# Patient Record
Sex: MALE | Race: WHITE | NOT HISPANIC OR LATINO
[De-identification: names, ages, dates, MRNs, and addresses within clinical notes are randomized per-mention and may not be internally consistent; named-entity substitution may affect disease eponyms.]

---

## 2017-01-12 PROBLEM — Z00.00 ENCOUNTER FOR PREVENTIVE HEALTH EXAMINATION: Status: ACTIVE | Noted: 2017-01-12

## 2018-05-07 ENCOUNTER — APPOINTMENT (OUTPATIENT)
Dept: CARDIOLOGY | Facility: CLINIC | Age: 73
End: 2018-05-07
Payer: MEDICARE

## 2018-05-07 VITALS
HEIGHT: 73 IN | WEIGHT: 206 LBS | BODY MASS INDEX: 27.3 KG/M2 | SYSTOLIC BLOOD PRESSURE: 118 MMHG | RESPIRATION RATE: 15 BRPM | DIASTOLIC BLOOD PRESSURE: 78 MMHG | HEART RATE: 92 BPM

## 2018-05-07 DIAGNOSIS — F17.200 NICOTINE DEPENDENCE, UNSPECIFIED, UNCOMPLICATED: ICD-10-CM

## 2018-05-07 DIAGNOSIS — Z82.49 FAMILY HISTORY OF ISCHEMIC HEART DISEASE AND OTHER DISEASES OF THE CIRCULATORY SYSTEM: ICD-10-CM

## 2018-05-07 PROCEDURE — 93000 ELECTROCARDIOGRAM COMPLETE: CPT | Mod: 59

## 2018-05-07 PROCEDURE — 93224 XTRNL ECG REC UP TO 48 HRS: CPT

## 2018-05-07 PROCEDURE — 93306 TTE W/DOPPLER COMPLETE: CPT

## 2018-05-07 PROCEDURE — 99204 OFFICE O/P NEW MOD 45 MIN: CPT

## 2018-05-21 ENCOUNTER — APPOINTMENT (OUTPATIENT)
Dept: CARDIOLOGY | Facility: CLINIC | Age: 73
End: 2018-05-21
Payer: MEDICARE

## 2018-05-21 PROCEDURE — 93880 EXTRACRANIAL BILAT STUDY: CPT

## 2018-07-30 ENCOUNTER — APPOINTMENT (OUTPATIENT)
Dept: CARDIOLOGY | Facility: CLINIC | Age: 73
End: 2018-07-30
Payer: MEDICARE

## 2018-07-30 PROCEDURE — 78452 HT MUSCLE IMAGE SPECT MULT: CPT

## 2018-07-30 PROCEDURE — A9500: CPT

## 2018-07-30 PROCEDURE — 93015 CV STRESS TEST SUPVJ I&R: CPT

## 2018-07-30 RX ORDER — REGADENOSON 0.08 MG/ML
0.4 INJECTION, SOLUTION INTRAVENOUS
Qty: 1 | Refills: 0 | Status: COMPLETED | OUTPATIENT
Start: 2018-07-30

## 2018-07-30 RX ORDER — AMINOPHYLLINE 25 MG/ML
25 INJECTION, SOLUTION INTRAVENOUS
Qty: 0 | Refills: 0 | Status: COMPLETED | OUTPATIENT
Start: 2018-07-30

## 2018-07-30 RX ADMIN — AMINOPHYLLINE 0 MG/ML: 25 INJECTION, SOLUTION INTRAVENOUS at 00:00

## 2018-07-30 RX ADMIN — REGADENOSON 0 MG/5ML: 0.08 INJECTION, SOLUTION INTRAVENOUS at 00:00

## 2018-08-03 RX ORDER — KIT FOR THE PREPARATION OF TECHNETIUM TC99M SESTAMIBI 1 MG/5ML
INJECTION, POWDER, LYOPHILIZED, FOR SOLUTION PARENTERAL
Refills: 0 | Status: COMPLETED | OUTPATIENT
Start: 2018-08-03

## 2018-08-03 RX ADMIN — KIT FOR THE PREPARATION OF TECHNETIUM TC99M SESTAMIBI 0: 1 INJECTION, POWDER, LYOPHILIZED, FOR SOLUTION PARENTERAL at 00:00

## 2018-11-19 ENCOUNTER — APPOINTMENT (OUTPATIENT)
Dept: CARDIOLOGY | Facility: CLINIC | Age: 73
End: 2018-11-19
Payer: MEDICARE

## 2018-11-19 VITALS
DIASTOLIC BLOOD PRESSURE: 86 MMHG | RESPIRATION RATE: 16 BRPM | BODY MASS INDEX: 27.98 KG/M2 | HEIGHT: 73 IN | SYSTOLIC BLOOD PRESSURE: 120 MMHG | WEIGHT: 211.13 LBS | HEART RATE: 83 BPM

## 2018-11-19 PROCEDURE — 99214 OFFICE O/P EST MOD 30 MIN: CPT

## 2018-11-19 PROCEDURE — 93000 ELECTROCARDIOGRAM COMPLETE: CPT

## 2018-11-20 NOTE — PHYSICAL EXAM
[General Appearance - Well Developed] : well developed [General Appearance - Well Nourished] : well nourished [General Appearance - In No Acute Distress] : no acute distress [Normal Conjunctiva] : the conjunctiva exhibited no abnormalities [Normal Oral Mucosa] : normal oral mucosa [Auscultation Breath Sounds / Voice Sounds] : lungs were clear to auscultation bilaterally [Veins - Varicosity Changes] : no varicosital changes were noted in the lower extremities [Irregularly Irregular] : the rhythm was irregularly irregular [Systolic grade ___/6] : A grade [unfilled]/6 systolic murmur was heard. [Bowel Sounds] : normal bowel sounds [Abnormal Walk] : normal gait [Skin Color & Pigmentation] : normal skin color and pigmentation [Skin Turgor] : normal skin turgor [Oriented To Time, Place, And Person] : oriented to person, place, and time [Affect] : the affect was normal [Mood] : the mood was normal [FreeTextEntry1] : No edema

## 2018-11-20 NOTE — HISTORY OF PRESENT ILLNESS
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  He states that his rheumatoid arthritis has flared up and he is scheduled to see his rheumatologist in the next few weeks.

## 2018-11-20 NOTE — ASSESSMENT
[FreeTextEntry1] : 1.  EKG today reveals atrial fibrillation with a controlled ventricular response.  One wide complex noted consistent with aberrancy.  No ischemic changes. \par 2.  Hypertension:  Blood pressure well controlled at this time on current medications.  A low-salt diet as well as weight loss is advised.  \par 3.  Hyperlipidemia:  Patient advised to follow a low-fat / low-cholesterol diet.  Fasting lipids to be performed prior to his next office visit. \par 4.  Chronic atrial fibrillation:  Patient remains with good rate control.  Recent Holter monitor revealed atrial fibrillation with a range between 56 and 155 bpm.  The average heart rate was 90 bpm.  Occasional multifocal PVCs averaging 28 per hour were noted.  No other significant findings. \par 5.  Recent non-invasive cardiac evaluation included echocardiography which revealed mild dilatation of the left ventricle with grossly normal global wall motion.  Left ventricular ejection fraction 55-60%.  There was enlargement of the left atrium as well as normal right-sided chamber dimensions.  The aortic root is dilated and measures approximately 4.8 cm.  The ascending aorta 4.0 cm.  There is moderate aortic valve insufficiency in association with the dilatation of the aortic root.  Patient to maintain normal blood pressure and to avoid unnecessary heavy lifting.  Repeat echocardiography in the next 6 months. \par 6.  Recent nuclear stress test revealed mild ischemia in the left ventricular apex.  Patient remains completely asymptomatic at this time.  Will observe at this point.  If symptoms develop, cardiac catheterization will be considered.  \par 7.  Carotid artery disease:  Patient with noted mild bilateral plaquing.  Advised to continue current Crestor therapy.  Patient should also add low-dose aspirin 81 mg daily.  If stable from a cardiac standpoint, office visit 6 months.  \par 
improved
improved
alert

## 2018-11-20 NOTE — REASON FOR VISIT
[FreeTextEntry1] : Patient is a 73-year-old white male with a past medical history significant for rheumatoid arthritis as well as hypercholesterolemia and atrial fibrillation who presents for evaluation.

## 2019-02-25 ENCOUNTER — APPOINTMENT (OUTPATIENT)
Dept: CARDIOLOGY | Facility: CLINIC | Age: 74
End: 2019-02-25
Payer: MEDICARE

## 2019-02-25 ENCOUNTER — NON-APPOINTMENT (OUTPATIENT)
Age: 74
End: 2019-02-25

## 2019-02-25 VITALS
HEART RATE: 90 BPM | SYSTOLIC BLOOD PRESSURE: 129 MMHG | WEIGHT: 214 LBS | BODY MASS INDEX: 28.36 KG/M2 | RESPIRATION RATE: 16 BRPM | HEIGHT: 73 IN | DIASTOLIC BLOOD PRESSURE: 90 MMHG

## 2019-02-25 DIAGNOSIS — R07.9 CHEST PAIN, UNSPECIFIED: ICD-10-CM

## 2019-02-25 PROCEDURE — 93000 ELECTROCARDIOGRAM COMPLETE: CPT

## 2019-02-25 PROCEDURE — 99214 OFFICE O/P EST MOD 30 MIN: CPT

## 2019-02-26 NOTE — ASSESSMENT
[FreeTextEntry1] : 1.  EKG today reveals atrial fibrillation with a controlled ventricular response.  No acute ischemic changes. \par 2.  Chest pain:  Patient with new onset chest pain which does not appear to be exertional.  Have advised on the addition of Metoprolol Succinate 25 mg daily as well as continuation of aspirin therapy.  Patient will undergo repeat nuclear stress test in the near future to reassess.  If symptoms worsen in any way, patient to contact me regarding alternative therapy. \par 3.  Hyperlipidemia:  Patient tolerating Crestor therapy well.  Has not undergone fasting bloodwork, but will do so in the near future. \par 4.  Aortic dilatation.  Patient will undergo follow up echocardiography within the next three months.   \par

## 2019-02-26 NOTE — REASON FOR VISIT
[FreeTextEntry1] : Patient presents today for the evaluation and management of hyperlipidemia and atrial fibrillation.  \par   \par

## 2019-02-26 NOTE — HISTORY OF PRESENT ILLNESS
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  Pain is non-radiating and does not appear to be associated with exertion.  Patient denies diaphoresis, shortness of breath, or other symptoms.  Known to have apical reversibility on prior nuclear stress test and has been completely asymptomatic from a chest pain point of view up until now.

## 2019-03-12 ENCOUNTER — MED ADMIN CHARGE (OUTPATIENT)
Age: 74
End: 2019-03-12

## 2019-03-12 ENCOUNTER — APPOINTMENT (OUTPATIENT)
Dept: CARDIOLOGY | Facility: CLINIC | Age: 74
End: 2019-03-12
Payer: MEDICARE

## 2019-03-12 PROCEDURE — A9500: CPT

## 2019-03-12 PROCEDURE — 78452 HT MUSCLE IMAGE SPECT MULT: CPT

## 2019-03-12 PROCEDURE — 93015 CV STRESS TEST SUPVJ I&R: CPT

## 2019-03-12 RX ADMIN — REGADENOSON 5 MG/5ML: 0.08 INJECTION, SOLUTION INTRAVENOUS at 00:00

## 2019-03-15 RX ORDER — KIT FOR THE PREPARATION OF TECHNETIUM TC99M SESTAMIBI 1 MG/5ML
INJECTION, POWDER, LYOPHILIZED, FOR SOLUTION PARENTERAL
Refills: 0 | Status: COMPLETED | OUTPATIENT
Start: 2019-03-15

## 2019-03-15 RX ADMIN — KIT FOR THE PREPARATION OF TECHNETIUM TC99M SESTAMIBI 0: 1 INJECTION, POWDER, LYOPHILIZED, FOR SOLUTION PARENTERAL at 00:00

## 2019-03-25 RX ORDER — REGADENOSON 0.08 MG/ML
0.4 INJECTION, SOLUTION INTRAVENOUS
Qty: 1 | Refills: 0 | Status: COMPLETED | OUTPATIENT
Start: 2019-03-12

## 2019-05-06 ENCOUNTER — APPOINTMENT (OUTPATIENT)
Dept: CARDIOLOGY | Facility: CLINIC | Age: 74
End: 2019-05-06
Payer: MEDICARE

## 2019-05-06 ENCOUNTER — NON-APPOINTMENT (OUTPATIENT)
Age: 74
End: 2019-05-06

## 2019-05-06 VITALS
HEIGHT: 73 IN | DIASTOLIC BLOOD PRESSURE: 78 MMHG | WEIGHT: 212 LBS | SYSTOLIC BLOOD PRESSURE: 124 MMHG | BODY MASS INDEX: 28.1 KG/M2 | RESPIRATION RATE: 16 BRPM | HEART RATE: 92 BPM

## 2019-05-06 PROCEDURE — 93000 ELECTROCARDIOGRAM COMPLETE: CPT

## 2019-05-06 PROCEDURE — 99214 OFFICE O/P EST MOD 30 MIN: CPT

## 2019-05-10 NOTE — HISTORY OF PRESENT ILLNESS
[FreeTextEntry1] : Patient presents today without specific cardiac-related complaints.  He states he had a 1-2 minute of chest discomfort a couple of days ago which occurred at rest.  He attributes it to GI issues.  There was no specific diaphoresis, shortness of breath, or other symptoms.  He recently underwent nuclear stress testing which failed to reveal evidence of ischemia.

## 2019-05-10 NOTE — ASSESSMENT
[FreeTextEntry1] : 1.  EKG today reveals atrial fibrillation with a controlled ventricular response.  Normal intervals.  No evidence of ischemia. \par 2.  Hyperlipidemia:  Patient has not undergone fasting bloodwork in recent months.  Will do so prior to his next office visit. \par 3.  Chronic atrial fibrillation:  Patient’s atrial fibrillation under control at this time.  \par 4.  Chest pain:  Recent nuclear stress test failed to reveal evidence of pharmacologically induced reversible ischemia.  Patient reassured and advised to continue a low-fat / low-cholesterol diet as well as an exercise program. \par 5.  If clinically stable and with no further atypical chest pain, patient will return in three months after undergoing a follow up echocardiogram.  \par

## 2019-05-10 NOTE — PHYSICAL EXAM
[General Appearance - Well Developed] : well developed [General Appearance - In No Acute Distress] : no acute distress [General Appearance - Well Nourished] : well nourished [Normal Conjunctiva] : the conjunctiva exhibited no abnormalities [Normal Oral Mucosa] : normal oral mucosa [Auscultation Breath Sounds / Voice Sounds] : lungs were clear to auscultation bilaterally [Veins - Varicosity Changes] : no varicosital changes were noted in the lower extremities [Irregularly Irregular] : the rhythm was irregularly irregular [Systolic grade ___/6] : A grade [unfilled]/6 systolic murmur was heard. [Bowel Sounds] : normal bowel sounds [Abnormal Walk] : normal gait [Skin Color & Pigmentation] : normal skin color and pigmentation [Skin Turgor] : normal skin turgor [Oriented To Time, Place, And Person] : oriented to person, place, and time [Affect] : the affect was normal [Mood] : the mood was normal [FreeTextEntry1] : No edema

## 2019-07-01 ENCOUNTER — APPOINTMENT (OUTPATIENT)
Dept: CARDIOLOGY | Facility: CLINIC | Age: 74
End: 2019-07-01
Payer: MEDICARE

## 2019-07-01 PROCEDURE — 93306 TTE W/DOPPLER COMPLETE: CPT

## 2019-09-04 ENCOUNTER — NON-APPOINTMENT (OUTPATIENT)
Age: 74
End: 2019-09-04

## 2019-09-04 ENCOUNTER — APPOINTMENT (OUTPATIENT)
Dept: CARDIOLOGY | Facility: CLINIC | Age: 74
End: 2019-09-04
Payer: MEDICARE

## 2019-09-04 VITALS
DIASTOLIC BLOOD PRESSURE: 70 MMHG | BODY MASS INDEX: 27.43 KG/M2 | WEIGHT: 207 LBS | HEART RATE: 83 BPM | HEIGHT: 73 IN | SYSTOLIC BLOOD PRESSURE: 124 MMHG

## 2019-09-04 PROCEDURE — 99215 OFFICE O/P EST HI 40 MIN: CPT

## 2019-09-04 PROCEDURE — 93000 ELECTROCARDIOGRAM COMPLETE: CPT

## 2019-09-05 NOTE — PHYSICAL EXAM
[General Appearance - Well Developed] : well developed [General Appearance - Well Nourished] : well nourished [General Appearance - In No Acute Distress] : no acute distress [Normal Oral Mucosa] : normal oral mucosa [Normal Conjunctiva] : the conjunctiva exhibited no abnormalities [Auscultation Breath Sounds / Voice Sounds] : lungs were clear to auscultation bilaterally [Veins - Varicosity Changes] : no varicosital changes were noted in the lower extremities [Irregularly Irregular] : the rhythm was irregularly irregular [Systolic grade ___/6] : A grade [unfilled]/6 systolic murmur was heard. [Bowel Sounds] : normal bowel sounds [Abnormal Walk] : normal gait [Skin Color & Pigmentation] : normal skin color and pigmentation [Skin Turgor] : normal skin turgor [Oriented To Time, Place, And Person] : oriented to person, place, and time [Affect] : the affect was normal [Mood] : the mood was normal [FreeTextEntry1] : No edema

## 2019-09-05 NOTE — REASON FOR VISIT
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

## 2019-09-05 NOTE — ASSESSMENT
[FreeTextEntry1] : 1.  EKG today reveals atrial fibrillation with a controlled ventricular response.   Non-specific ST-T changes.  \par 2.  Hyperlipidemia:  Review of recent lipid profile performed through his PCP’s office reveals a total cholesterol of 176, HDL 75, TC/HDL ratio 2.3, LDL 84, triglycerides 81.  Patient advised to continue with current medications and follow a low-fat / low-cholesterol diet.  \par 3.  Chronic atrial fibrillation:  Patient remains clinically stable denying chest pain, shortness of breath, lightheadedness, or syncope.  His atrial fibrillation appears to be well controlled at this time.  Recent echocardiography revealed mild left ventricular enlargement with normal global systolic function.  Ejection fraction estimated at 55-60%.  Severe dilatation of the left atrial cavity is noted.  Patient also with mild dilatation of the right-sided chambers.  The aortic root demonstrates a diameter of approximately 4.7 cm while the ascending aorta measures 4.3 cm.  Mild to moderate aortic valve insufficiency is noted as well.  No other significant findings.  Patient advised to maintain blood pressure at current levels and to avoid unnecessary lifting.       \par 4.  Recent CBC demonstrates an H and H of 17.3 and 52.9.  This is noted in the presence of a BUN of 34 and a creatinine of 1.03.  Patient may be somewhat dehydrated and be demonstrating relative polycythemia.  However, I have advised him to seek the attention of a hematologist for further evaluation.  It is possible that the patient has developed chronic pulmonary disease secondary to tobacco use or possibly underlying sleep apnea. \par 5.  Given the above, patient to continue all current medications and return to see me within three months for reevaluation.   \par

## 2019-09-05 NOTE — HISTORY OF PRESENT ILLNESS
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

## 2019-10-30 ENCOUNTER — RX RENEWAL (OUTPATIENT)
Age: 74
End: 2019-10-30

## 2019-10-31 ENCOUNTER — MEDICATION RENEWAL (OUTPATIENT)
Age: 74
End: 2019-10-31

## 2019-12-02 ENCOUNTER — APPOINTMENT (OUTPATIENT)
Dept: CARDIOLOGY | Facility: CLINIC | Age: 74
End: 2019-12-02
Payer: MEDICARE

## 2019-12-02 VITALS
DIASTOLIC BLOOD PRESSURE: 78 MMHG | WEIGHT: 207 LBS | HEIGHT: 73 IN | RESPIRATION RATE: 16 BRPM | HEART RATE: 81 BPM | SYSTOLIC BLOOD PRESSURE: 130 MMHG | BODY MASS INDEX: 27.43 KG/M2

## 2019-12-02 DIAGNOSIS — R94.31 ABNORMAL ELECTROCARDIOGRAM [ECG] [EKG]: ICD-10-CM

## 2019-12-02 PROCEDURE — 99214 OFFICE O/P EST MOD 30 MIN: CPT

## 2019-12-02 PROCEDURE — 93000 ELECTROCARDIOGRAM COMPLETE: CPT

## 2019-12-04 NOTE — ASSESSMENT
[FreeTextEntry1] :  1.  EKG today reveals atrial fibrillation with a controlled ventricular response.  Left anterior hemiblock with left axis deviation.  Non-specific ST-T changes.  2.  Chronic atrial fibrillation:  Patient remains in a-fib with a controlled ventricular response.  Tolerating Eliquis therapy well.  3.  Hyperlipidemia:  Patient advised to follow a low-fat / low-cholesterol diet.   4.  Recently elevated H and H.  Patient to see hematologist in the next two weeks.  5.  Dilatation of the aortic root and ascending aorta as well as multichamber enlargement on echocardiography.  Will obtain cardiac CTA in the near future to further assess overall picture.   6.  Tobacco use:  Patient still smoking up to 10 cigarettes per day.  May have developed COPD.  Will also need an assessment for sleep apnea.  He will monitor this at home initially and report back to me in three months.

## 2019-12-04 NOTE — HISTORY OF PRESENT ILLNESS
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  He does complain of chronic post-nasal drip.  No other significant symptoms.

## 2019-12-04 NOTE — REASON FOR VISIT
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 \par

## 2020-04-06 ENCOUNTER — APPOINTMENT (OUTPATIENT)
Dept: CARDIOLOGY | Facility: CLINIC | Age: 75
End: 2020-04-06

## 2020-06-01 ENCOUNTER — APPOINTMENT (OUTPATIENT)
Dept: CARDIOLOGY | Facility: CLINIC | Age: 75
End: 2020-06-01

## 2020-07-07 ENCOUNTER — APPOINTMENT (OUTPATIENT)
Dept: CARDIOLOGY | Facility: CLINIC | Age: 75
End: 2020-07-07
Payer: MEDICARE

## 2020-07-07 VITALS
HEIGHT: 73 IN | DIASTOLIC BLOOD PRESSURE: 74 MMHG | HEART RATE: 81 BPM | TEMPERATURE: 96.9 F | RESPIRATION RATE: 16 BRPM | WEIGHT: 204 LBS | BODY MASS INDEX: 27.04 KG/M2 | SYSTOLIC BLOOD PRESSURE: 122 MMHG

## 2020-07-07 PROCEDURE — 99214 OFFICE O/P EST MOD 30 MIN: CPT

## 2020-07-07 PROCEDURE — 93000 ELECTROCARDIOGRAM COMPLETE: CPT

## 2020-07-07 RX ORDER — FOLIC ACID/MULTIVIT,IRON,MINER 0.4MG-18MG
TABLET ORAL
Refills: 0 | Status: DISCONTINUED | COMMUNITY
End: 2020-07-07

## 2020-07-07 NOTE — REASON FOR VISIT
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 \par \par \par

## 2020-07-07 NOTE — ASSESSMENT
[FreeTextEntry1] : \par 1.  EKG today reveals atrial fibrillation with a controlled ventricular response.  Small Qs in leads III and aVF.  Non-specific ST-T changes.  \par \par Chronic atrial fibrillation:  Patient remains in a-fib with a controlled ventricular response.  Tolerating Eliquis therapy well. \par \par 2.  Hyperlipidemia:  Recent lipid profile performed through his PCP’s office demonstrates a total cholesterol of 170, HDL 64, TC/HDL ratio 2.7, LDL 84, triglycerides 123.  Patient advised to continue current statin therapy as well as follow a strict low-fat / low-cholesterol diet.  \par \par 3.  Dilatation of the aortic root and ascending aorta:  Patient remains clinically stable but has not undergone follow up echocardiography in some time.  Will schedule an echo prior to his next office visit.  It is also likely that he will undergo a chest CT scan for further evaluation as well. \par \par 4.  Polycythemia:  Patient with periodic phlebotomies through his hematologist.  Continues to smoke 5-6 cigarettes per day.  I have advised him to quit smoking.    \par

## 2020-07-07 NOTE — PHYSICAL EXAM
[General Appearance - Well Nourished] : well nourished [General Appearance - Well Developed] : well developed [General Appearance - In No Acute Distress] : no acute distress [Normal Conjunctiva] : the conjunctiva exhibited no abnormalities [Normal Oral Mucosa] : normal oral mucosa [Auscultation Breath Sounds / Voice Sounds] : lungs were clear to auscultation bilaterally [Veins - Varicosity Changes] : no varicosital changes were noted in the lower extremities [Irregularly Irregular] : the rhythm was irregularly irregular [Systolic grade ___/6] : A grade [unfilled]/6 systolic murmur was heard. [Bowel Sounds] : normal bowel sounds [Abnormal Walk] : normal gait [Skin Color & Pigmentation] : normal skin color and pigmentation [Skin Turgor] : normal skin turgor [Affect] : the affect was normal [Oriented To Time, Place, And Person] : oriented to person, place, and time [Mood] : the mood was normal [FreeTextEntry1] : No edema

## 2020-10-20 ENCOUNTER — RX RENEWAL (OUTPATIENT)
Age: 75
End: 2020-10-20

## 2021-01-14 ENCOUNTER — APPOINTMENT (OUTPATIENT)
Dept: CARDIOLOGY | Facility: CLINIC | Age: 76
End: 2021-01-14
Payer: MEDICARE

## 2021-01-14 PROCEDURE — 93306 TTE W/DOPPLER COMPLETE: CPT

## 2021-03-30 ENCOUNTER — APPOINTMENT (OUTPATIENT)
Dept: CARDIOLOGY | Facility: CLINIC | Age: 76
End: 2021-03-30
Payer: MEDICARE

## 2021-03-30 VITALS
RESPIRATION RATE: 16 BRPM | SYSTOLIC BLOOD PRESSURE: 138 MMHG | BODY MASS INDEX: 27.83 KG/M2 | TEMPERATURE: 98 F | DIASTOLIC BLOOD PRESSURE: 80 MMHG | HEART RATE: 93 BPM | HEIGHT: 73 IN | WEIGHT: 210 LBS

## 2021-03-30 VITALS — DIASTOLIC BLOOD PRESSURE: 84 MMHG | SYSTOLIC BLOOD PRESSURE: 124 MMHG

## 2021-03-30 PROCEDURE — 99214 OFFICE O/P EST MOD 30 MIN: CPT

## 2021-03-30 PROCEDURE — 93000 ELECTROCARDIOGRAM COMPLETE: CPT

## 2021-04-01 NOTE — ASSESSMENT
[FreeTextEntry1] : 1.  EKG today reveals atrial fibrillation with a controlled ventricular response.  Qs in leads III and aVF.  No acute ischemic changes.  \par \par 2.  Dilatation of the aortic root and ascending aorta:  Echocardiography performed this January revealed normal left ventricular chamber dimensions and wall motion with preserved ejection fraction estimated between 55 and 60%.  Moderate dilatation of the left atrium is noted.  The right-sided chambers have normal dimensions and function.  There is moderate dilatation of the aortic root and ascending aorta.  Diameters of 4.7 and 4.4 cm respectively are noted.  These are essentially unchanged when compared to his prior echo performed in 2019.\par \par 3.  Patient is again advised on good blood pressure control and avoiding lifting objects heavier than 40 lbs.  \par \par 4.  Chronic atrial fibrillation:  Patient remains in a-fib with a controlled ventricular response.  Tolerating beta blocker therapy and Eliquis well.  \par \par 5.  Hyperlipidemia:  Recent bloodwork revealed a total cholesterol of 159, HDL 66, TC/HDL ratio 2.4, LDL 77, triglycerides 82.  Patient advised to continue with current statin therapy as well as a low-fat / low-cholesterol diet.  If clinically stable, follow up office visit three months.   \par

## 2021-04-01 NOTE — REASON FOR VISIT
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   \par  none

## 2021-04-01 NOTE — HISTORY OF PRESENT ILLNESS
[FreeTextEntry1] :  From a cardiac standpoint, Dr. Juarez remains clinically stable denying exertional chest pain, shortness of breath, palpitations, lightheadedness, or syncope.  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

## 2021-11-10 ENCOUNTER — RX RENEWAL (OUTPATIENT)
Age: 76
End: 2021-11-10

## 2022-02-09 ENCOUNTER — RX RENEWAL (OUTPATIENT)
Age: 77
End: 2022-02-09

## 2022-03-28 ENCOUNTER — RESULT CHARGE (OUTPATIENT)
Age: 77
End: 2022-03-28

## 2022-03-29 ENCOUNTER — APPOINTMENT (OUTPATIENT)
Dept: CARDIOLOGY | Facility: CLINIC | Age: 77
End: 2022-03-29
Payer: MEDICARE

## 2022-03-29 VITALS
SYSTOLIC BLOOD PRESSURE: 160 MMHG | BODY MASS INDEX: 27.83 KG/M2 | WEIGHT: 210 LBS | RESPIRATION RATE: 16 BRPM | HEIGHT: 73 IN | HEART RATE: 85 BPM | DIASTOLIC BLOOD PRESSURE: 82 MMHG

## 2022-03-29 VITALS — DIASTOLIC BLOOD PRESSURE: 80 MMHG | SYSTOLIC BLOOD PRESSURE: 140 MMHG

## 2022-03-29 PROCEDURE — 99214 OFFICE O/P EST MOD 30 MIN: CPT

## 2022-03-29 PROCEDURE — 93000 ELECTROCARDIOGRAM COMPLETE: CPT

## 2022-03-30 NOTE — REASON FOR VISIT
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   \par

## 2022-03-30 NOTE — HISTORY OF PRESENT ILLNESS
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  He does present with fatigue which is nonspecific.  \par

## 2022-03-30 NOTE — ASSESSMENT
[FreeTextEntry1] : 1.  EKG today reveals atrial fibrillation with a controlled ventricular response.  One wide complex noted suggesting possible PVC versus aberrantly conducted beat.  No acute ischemic changes. \par \par 2.  Chronic atrial fibrillation:  Patient remains in a-fib with a controlled ventricular response.  Has no complaints at this time.  Tolerating Eliquis therapy well. \par \par 3.  Hypertension:  Blood pressure borderline controlled at this time.  Patient admits to eating fair amount salty products.  Additionally has had intermittent mild lower extremity edema.  Currently on Furosemide 20.  Have advised him to augment Furosemide to a 20/40 schedule whenever edema presents.  Will also provide patient with potassium 10 mEq daily given daily cramping.  Patient will undergo bloodwork in 3-4 weeks to assess renal function as well as cholesterol.  \par \par 4.  Hyperlipidemia:  No recent bloodwork available.  Patient advised to continue with Crestor. \par \par 5.  Polycythemia:  Under the care of hematology and undergoing periodic phlebotomies.  \par \par 6.  Patient will undergo echocardiography to assess the dilatation of his aorta prior to his next office visit in six weeks.  \par

## 2022-04-19 ENCOUNTER — APPOINTMENT (OUTPATIENT)
Dept: CARDIOLOGY | Facility: CLINIC | Age: 77
End: 2022-04-19
Payer: MEDICARE

## 2022-04-19 PROCEDURE — 93306 TTE W/DOPPLER COMPLETE: CPT

## 2022-05-10 ENCOUNTER — NON-APPOINTMENT (OUTPATIENT)
Age: 77
End: 2022-05-10

## 2022-05-10 ENCOUNTER — APPOINTMENT (OUTPATIENT)
Dept: CARDIOLOGY | Facility: CLINIC | Age: 77
End: 2022-05-10
Payer: MEDICARE

## 2022-05-10 VITALS
HEIGHT: 73 IN | SYSTOLIC BLOOD PRESSURE: 142 MMHG | HEART RATE: 94 BPM | BODY MASS INDEX: 26.51 KG/M2 | RESPIRATION RATE: 16 BRPM | WEIGHT: 200 LBS | DIASTOLIC BLOOD PRESSURE: 80 MMHG

## 2022-05-10 PROCEDURE — 93000 ELECTROCARDIOGRAM COMPLETE: CPT

## 2022-05-10 PROCEDURE — 99214 OFFICE O/P EST MOD 30 MIN: CPT

## 2022-05-10 NOTE — DISCUSSION/SUMMARY
[FreeTextEntry1] : Dr. Judd in to speak with the patient.\par \par 1 - Hypertension:  blood pressure borderline controlled on current medications and HR in the 90.  Will increase metoprolol succinate to 50mg daily.  Advised to follow strict low sodium diet.\par \par 2 - Dilatation of the ascending aorta:  recent echocardiogram (4/19/2022):  EF 50-55%.  Moderate asymmetric left ventricular hypertrophy.  Severely dilated left atrium.  MOderately dilated right atrium.  Mildly enlarged right ventricle.  There is mild to moderate dilatation of the aortic root (4.51 cm) and ascending aorta (4.2 cm).  Mild aortic valve sclerosis without stenosis.  Mild to moderate aortic regurgitation.   Mild mitral valve regurgitation.  Trace pulmonic valve regurgitation.  Mildly elevated pulmonary artery systolic pressures.  Prior measurements of aortic root (1/2021, 4.7cm - 7/2019, 4.7cm - 5/2018, 4.8cm) and ascending aorta (1/2021, 4.4cm - 7/2019, 4.3cm - 5/2018, 4cm).  Will have Dr. Juarez undergo CT of the chest to better assess progression of ascending aorta and aortic root.\par \par 3 - Chronic atrial fibrillation:  today's EKG reveals atrial fibrillation with a controlled ventricular response (94 bpm).  Increased metoprolol succinate to 50mg daily.  Tolerating Eliquis 5mg BID well.\par \par 4 - Polycythemia:  H/H 17.1/52.5, RBC 5.84.  Patient is followed by hematology, Dr. Krzysztof Schaeffer.  Labs will be forwarded to his office.\par \par 5 - Hyperlipidemia:  cholesterol 175, HDL 72, triglycerides 83, LDL 86, TC/HDL 2.4.  Continue Crestor 20mg daily.  Follow low fat, low cholesterol diet.\par \par 6 - Recent labs (4/19/2022):  glucose 99, HgA1c 5.3, potassium 4.8, BUN 31, creatinine 1.07, TSH 2.9m, Quantiferon TB Gold was negative.\par \par 7 - Follow up with Dr. Judd in 3 months.

## 2022-05-10 NOTE — CARDIOLOGY SUMMARY
[de-identified] : Atrial fibrillation with a controlled ventricular response.  No acute ischemic changes.

## 2022-05-10 NOTE — PHYSICAL EXAM
[Well Developed] : well developed [Well Nourished] : well nourished [No Acute Distress] : no acute distress [Normal Conjunctiva] : normal conjunctiva [Normal Venous Pressure] : normal venous pressure [No Carotid Bruit] : no carotid bruit [Normal S1, S2] : normal S1, S2 [No Rub] : no rub [No Gallop] : no gallop [Clear Lung Fields] : clear lung fields [No Respiratory Distress] : no respiratory distress  [Soft] : abdomen soft [Normal Bowel Sounds] : normal bowel sounds [Normal Gait] : normal gait [No Rash] : no rash [No Skin Lesions] : no skin lesions [Moves all extremities] : moves all extremities [No Focal Deficits] : no focal deficits [Normal Speech] : normal speech [Alert and Oriented] : alert and oriented [Normal memory] : normal memory [de-identified] : Irregularly irregular rhythm, I-II/VI systolic murmur [de-identified] : Trace to 1+ bilateral LE edema L>R

## 2022-05-10 NOTE — HISTORY OF PRESENT ILLNESS
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

## 2022-08-02 ENCOUNTER — APPOINTMENT (OUTPATIENT)
Dept: CARDIOLOGY | Facility: CLINIC | Age: 77
End: 2022-08-02

## 2022-08-30 ENCOUNTER — APPOINTMENT (OUTPATIENT)
Dept: CARDIOLOGY | Facility: CLINIC | Age: 77
End: 2022-08-30

## 2022-08-30 ENCOUNTER — NON-APPOINTMENT (OUTPATIENT)
Age: 77
End: 2022-08-30

## 2022-08-30 VITALS — SYSTOLIC BLOOD PRESSURE: 132 MMHG | DIASTOLIC BLOOD PRESSURE: 80 MMHG

## 2022-08-30 VITALS
HEIGHT: 73 IN | BODY MASS INDEX: 27.96 KG/M2 | HEART RATE: 87 BPM | DIASTOLIC BLOOD PRESSURE: 84 MMHG | WEIGHT: 211 LBS | RESPIRATION RATE: 16 BRPM | SYSTOLIC BLOOD PRESSURE: 142 MMHG

## 2022-08-30 PROCEDURE — 99214 OFFICE O/P EST MOD 30 MIN: CPT

## 2022-08-30 PROCEDURE — 93000 ELECTROCARDIOGRAM COMPLETE: CPT

## 2022-08-30 NOTE — PHYSICAL EXAM
[Well Developed] : well developed [Well Nourished] : well nourished [No Acute Distress] : no acute distress [Normal Conjunctiva] : normal conjunctiva [Normal Venous Pressure] : normal venous pressure [No Carotid Bruit] : no carotid bruit [Normal S1, S2] : normal S1, S2 [No Rub] : no rub [No Gallop] : no gallop [Clear Lung Fields] : clear lung fields [No Respiratory Distress] : no respiratory distress  [Soft] : abdomen soft [Normal Bowel Sounds] : normal bowel sounds [Normal Gait] : normal gait [No Rash] : no rash [No Skin Lesions] : no skin lesions [Moves all extremities] : moves all extremities [No Focal Deficits] : no focal deficits [Normal Speech] : normal speech [Alert and Oriented] : alert and oriented [Normal memory] : normal memory [de-identified] : Irregularly irregular rhythm, I-II/VI systolic murmur [de-identified] : Trace bilateral Le Edema

## 2022-08-30 NOTE — HISTORY OF PRESENT ILLNESS
[FreeTextEntry1] : Mr. Juarez presents today without complaints of exertional chest pain, shortness of breath, palpitations, lightheadedness or syncope.  Admits he is not as good with his diet and low sodium intake as he should be, he also "forgets" to take his evening medications from time to time.

## 2022-08-30 NOTE — DISCUSSION/SUMMARY
[EKG obtained to assist in diagnosis and management of assessed problem(s)] : EKG obtained to assist in diagnosis and management of assessed problem(s) [FreeTextEntry1] : 1 - Hypertension: repeat blood pressure 132/80.  Advised to take all of his medications as prescribed.  Follow strict low sodium diet and weight loss.\par \par 2 - Dilatation of the ascending aorta:  Mr. Juarez did not have his CT of the chest to assess the progression of his ascending aorta and aortic root.  Will do it before his follow up visit.\par \par 3 - Chronic atrial fibrillation:  today's EKG reveals atrial fibrillation with a controlled ventricular response.  Continue Eliquis 5mg BID.\par \par 4 - Polycythemia:  follows up with hematologist, Dr. Krzysztof Schaeffer.  \par \par 5 - Hyperlipidemia:  continue Crestor 20mg daily.  Follow low fat, low cholesterol diet.\par \par 6 - Recent labs from hematology (8/2/2022):  glucose 122, BUN 29.1, creatinine 1.19, Potassium 5.5 (decrease potassium 10meq to every other day, repeat potassium level), PSA 6.71 (followed by urology), H/H 17.6/54.2 (sees hematology), platelets 136.\par \par 7 - Follow up in 3 months with Dr. Judd.

## 2022-08-30 NOTE — CARDIOLOGY SUMMARY
[de-identified] : Atrial fibrillation with a controlled ventricular response.  Poor R-wave progression in V1-V3.  No acute ischemic changes.

## 2022-10-05 DIAGNOSIS — R93.89 ABNORMAL FINDINGS ON DIAGNOSTIC IMAGING OF OTHER SPECIFIED BODY STRUCTURES: ICD-10-CM

## 2022-11-15 ENCOUNTER — APPOINTMENT (OUTPATIENT)
Dept: PULMONOLOGY | Facility: CLINIC | Age: 77
End: 2022-11-15

## 2022-11-15 VITALS
HEIGHT: 71 IN | WEIGHT: 215 LBS | SYSTOLIC BLOOD PRESSURE: 142 MMHG | DIASTOLIC BLOOD PRESSURE: 84 MMHG | BODY MASS INDEX: 30.1 KG/M2

## 2022-11-15 VITALS — OXYGEN SATURATION: 98 %

## 2022-11-15 PROCEDURE — 99205 OFFICE O/P NEW HI 60 MIN: CPT

## 2022-11-22 NOTE — HISTORY OF PRESENT ILLNESS
[TextBox_4] : smoking x40 yrs, on and off\par now 1/2 ppd\par had cats and dogs, no longer\par works as oral surgeon, active\par no environmental exposures\par has RA, on Enbrel, prednisone 1 mg-- Rheumatology Dr Arash Brown, [phone]\par Had CT scan ordered by Cardiology for aortic root\par found ILD component\par Denies any acute change in pulmonary status, no fever, chill, chest pain\par Has chronic rhinitis\par Does note some exertional dyspnea\par no hx Covid infection, had vaccinations, getting bivalent booster

## 2022-11-22 NOTE — PROCEDURE
[FreeTextEntry1] : CXRs progressive ILD 2/17- 8/21\par CT scan 9/22\par mild adenopathy, reticular scarring, septal thickening, traction bronchiectasis, faint GG\par subpleural and basilar predominant

## 2022-11-22 NOTE — CONSULT LETTER
[Dear  ___] : Dear  [unfilled], [Consult Letter:] : I had the pleasure of evaluating your patient, [unfilled]. [Please see my note below.] : Please see my note below. [Sincerely,] : Sincerely, [FreeTextEntry3] : [facility] DO [facility]\par Pulmonary Critical Care\par Director Pulmonary Division\par Medical Director Respiratory Therapy\par [facility]\par \par  [DrMerline  ___] : Dr. KLEIN

## 2022-11-22 NOTE — REVIEW OF SYSTEMS
[Nasal Congestion] : nasal congestion [Arthralgias] : arthralgias [Negative] : Endocrine [TextBox_30] : see HPI [TextBox_44] : see HPI [TextBox_110] : polycythemia

## 2022-11-22 NOTE — PHYSICAL EXAM
[No Acute Distress] : no acute distress [Normal Appearance] : normal appearance [Normal Rate/Rhythm] : normal rate/rhythm [Normal S1, S2] : normal s1, s2 [No Edema] : no edema [No Murmurs] : no murmurs [No Rubs] : no rubs [No Gallops] : no gallops [No Resp Distress] : no resp distress [No Acc Muscle Use] : no acc muscle use [Normal Rhythm and Effort] : normal rhythm and effort [No Abnormalities] : no abnormalities [Normal Color/ Pigmentation] : normal color/ pigmentation [No Focal Deficits] : no focal deficits [Oriented x3] : oriented x3 [Normal Affect] : normal affect [TextBox_68] : basilar  crackles bilaterally

## 2022-11-22 NOTE — DISCUSSION/SUMMARY
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 RB-ILD component, doubt Sarcoid, no HP component by hx, no evidence of active infection, doubt Enbrel related\par Exam with basilar crackles, no desaturation with exercise\par Does not report sig GERD, aspiration precautions reviewed, no current environmental exposures\par Smoking cessation, PFTs, repeat CT scan 3 months for adenopathy\par Eventual sleep study if allows\par Discussed treatment options, including anti inflammatory and chronic fibrosing component\par Would not pursue bx currently given co morbidity/age and known RA/smoking\par Will contact Live Ward his rheumatologist\par 2 months or sooner if needed\par

## 2022-11-29 ENCOUNTER — APPOINTMENT (OUTPATIENT)
Dept: CARDIOLOGY | Facility: CLINIC | Age: 77
End: 2022-11-29

## 2022-11-29 ENCOUNTER — RX RENEWAL (OUTPATIENT)
Age: 77
End: 2022-11-29

## 2022-11-29 VITALS
HEART RATE: 80 BPM | RESPIRATION RATE: 16 BRPM | BODY MASS INDEX: 30.1 KG/M2 | WEIGHT: 215 LBS | DIASTOLIC BLOOD PRESSURE: 78 MMHG | HEIGHT: 71 IN | SYSTOLIC BLOOD PRESSURE: 150 MMHG

## 2022-11-29 PROCEDURE — 93000 ELECTROCARDIOGRAM COMPLETE: CPT

## 2022-11-29 PROCEDURE — 99214 OFFICE O/P EST MOD 30 MIN: CPT

## 2022-11-29 RX ORDER — ASPIRIN ENTERIC COATED TABLETS 81 MG 81 MG/1
81 TABLET, DELAYED RELEASE ORAL
Refills: 0 | Status: ACTIVE | COMMUNITY

## 2022-11-29 RX ORDER — VITAMIN K2 90 MCG
125 MCG CAPSULE ORAL
Qty: 90 | Refills: 1 | Status: ACTIVE | COMMUNITY
Start: 2019-09-04

## 2022-11-29 RX ORDER — ETANERCEPT 50 MG/ML
50 SOLUTION SUBCUTANEOUS
Qty: 4 | Refills: 0 | Status: DISCONTINUED | COMMUNITY
Start: 2022-02-03 | End: 2022-11-29

## 2022-12-01 NOTE — ASSESSMENT
[FreeTextEntry1] : \par Dilatation of the aortic root and ascending aorta:  Patient recent underwent a chest CT scan which revealed thoracic aortic dilatation involving the ascending aorta with a diameter of 4.2 cm pretty much similar to findings on echocardiography.  The aortic root was also noted to be dilated at approximately 5.0 cm which may represent a slight increase over the 4.7-4.8 cm measurements noted on echocardiography.  Will continue to follow up clinically.  Echocardiography in the next six months. \par   \par

## 2022-12-01 NOTE — HISTORY OF PRESENT ILLNESS
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  He has recently been evaluated by pulmonology and is undergoing an initial evaluation.  He is known to have interstitial lung disease possibly secondary to rheumatoid arthritis. \par

## 2022-12-01 NOTE — REASON FOR VISIT
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   \par

## 2022-12-05 ENCOUNTER — NON-APPOINTMENT (OUTPATIENT)
Age: 77
End: 2022-12-05

## 2023-01-25 ENCOUNTER — APPOINTMENT (OUTPATIENT)
Dept: CT IMAGING | Facility: CLINIC | Age: 78
End: 2023-01-25
Payer: MEDICARE

## 2023-01-25 ENCOUNTER — OUTPATIENT (OUTPATIENT)
Dept: OUTPATIENT SERVICES | Facility: HOSPITAL | Age: 78
LOS: 1 days | End: 2023-01-25
Payer: MEDICARE

## 2023-01-25 DIAGNOSIS — R59.0 LOCALIZED ENLARGED LYMPH NODES: ICD-10-CM

## 2023-01-25 DIAGNOSIS — J84.9 INTERSTITIAL PULMONARY DISEASE, UNSPECIFIED: ICD-10-CM

## 2023-01-25 PROCEDURE — 71250 CT THORAX DX C-: CPT

## 2023-01-25 PROCEDURE — 71250 CT THORAX DX C-: CPT | Mod: 26,MH

## 2023-02-01 ENCOUNTER — APPOINTMENT (OUTPATIENT)
Dept: PULMONOLOGY | Facility: CLINIC | Age: 78
End: 2023-02-01
Payer: MEDICARE

## 2023-02-01 VITALS — WEIGHT: 208 LBS | BODY MASS INDEX: 29.01 KG/M2

## 2023-02-01 VITALS — HEIGHT: 70 IN | WEIGHT: 208 LBS | BODY MASS INDEX: 29.78 KG/M2

## 2023-02-01 VITALS — OXYGEN SATURATION: 98 %

## 2023-02-01 PROCEDURE — 94010 BREATHING CAPACITY TEST: CPT

## 2023-02-01 PROCEDURE — 99406 BEHAV CHNG SMOKING 3-10 MIN: CPT

## 2023-02-01 PROCEDURE — 94729 DIFFUSING CAPACITY: CPT

## 2023-02-01 PROCEDURE — 85018 HEMOGLOBIN: CPT | Mod: QW

## 2023-02-01 PROCEDURE — 99214 OFFICE O/P EST MOD 30 MIN: CPT | Mod: 25

## 2023-02-01 PROCEDURE — 94727 GAS DIL/WSHOT DETER LNG VOL: CPT

## 2023-02-01 RX ORDER — GUAIFENESIN 600 MG/1
600 TABLET, EXTENDED RELEASE ORAL
Refills: 0 | Status: COMPLETED | COMMUNITY
End: 2023-02-01

## 2023-02-01 RX ORDER — HYDROCODONE BITARTRATE AND ACETAMINOPHEN 7.5; 325 MG/1; MG/1
7.5-325 TABLET ORAL
Qty: 30 | Refills: 0 | Status: COMPLETED | COMMUNITY
Start: 2022-10-27 | End: 2023-02-01

## 2023-02-01 NOTE — COUNSELING
[Yes] : Risk of tobacco use and health benefits of smoking cessation discussed: Yes [Cessation strategies including cessation program discussed] : Cessation strategies including cessation program discussed [Use of nicotine replacement therapies and other medications discussed] : Use of nicotine replacement therapies and other medications discussed [Encouraged to pick a quit date and identify support needed to quit] : Encouraged to pick a quit date and identify support needed to quit [Smoking Cessation Program Referral] : Smoking Cessation Program Referral  [FreeTextEntry1] : 4

## 2023-02-01 NOTE — CONSULT LETTER
[Dear  ___] : Dear  [unfilled], [Consult Letter:] : I had the pleasure of evaluating your patient, [unfilled]. [Please see my note below.] : Please see my note below. [Sincerely,] : Sincerely, [DrMerline  ___] : Dr. KLEIN [FreeTextEntry3] : Clarke [facility] DO [facility]\par Pulmonary Critical Care\par Director Pulmonary Division\par Medical Director Respiratory Therapy\par [facility]\par \par

## 2023-02-01 NOTE — HISTORY OF PRESENT ILLNESS
[TextBox_4] : smoking x40 yrs, on and off\par now 1/2 ppd\par had cats and dogs, no longer\par works as oral surgeon, active\par no environmental exposures\par has RA, on Enbrel, prednisone 1 mg-- Rheumatology Dr Arash Brown, [phone]\par Had CT scan ordered by Cardiology for aortic root\par found ILD component\par Denies any acute change in pulmonary status, no fever, chill, chest pain\par Has chronic rhinitis\par Does note some exertional dyspnea\par no hx Covid infection, had vaccinations, getting bivalent booster\par \par 2/1/23\par no acute change in status\par no CP or sputum\par no fevers

## 2023-02-01 NOTE — DISCUSSION/SUMMARY
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 RB-ILD component, doubt Sarcoid, no HP component by hx, no evidence of active infection, doubt Enbrel related\par Exam with basilar crackles, no desaturation with exercise\par Does not report sig GERD, aspiration precautions reviewed, no current environmental exposures\par Smoking cessation, PFTs, repeat CT scan pending\par Eventual sleep study , doesn’t want currently\par Discussed treatment options, including anti inflammatory and chronic fibrosing component\par Discussed OFEV use , risks and benefits, he is undecided, will also have to clear with Cardiology\par Would not pursue bx currently given co morbidity/age and known RA/smoking\par Await discussion with  Live Ward his rheumatologist\par 2-3 months or sooner if needed, will call with above\par

## 2023-03-07 ENCOUNTER — APPOINTMENT (OUTPATIENT)
Dept: CARDIOLOGY | Facility: CLINIC | Age: 78
End: 2023-03-07
Payer: MEDICARE

## 2023-03-07 VITALS
DIASTOLIC BLOOD PRESSURE: 80 MMHG | RESPIRATION RATE: 16 BRPM | WEIGHT: 210 LBS | HEART RATE: 85 BPM | HEIGHT: 71 IN | BODY MASS INDEX: 29.4 KG/M2 | SYSTOLIC BLOOD PRESSURE: 140 MMHG

## 2023-03-07 DIAGNOSIS — I65.23 OCCLUSION AND STENOSIS OF BILATERAL CAROTID ARTERIES: ICD-10-CM

## 2023-03-07 PROCEDURE — 93000 ELECTROCARDIOGRAM COMPLETE: CPT

## 2023-03-07 PROCEDURE — 99214 OFFICE O/P EST MOD 30 MIN: CPT

## 2023-03-07 RX ORDER — ROSUVASTATIN CALCIUM 20 MG/1
20 TABLET, FILM COATED ORAL DAILY
Qty: 90 | Refills: 3 | Status: ACTIVE | COMMUNITY
Start: 1900-01-01 | End: 1900-01-01

## 2023-03-08 NOTE — REASON FOR VISIT
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   \par

## 2023-03-08 NOTE — ASSESSMENT
[FreeTextEntry1] : \par 1. EKG today reveals atrial fibrillation with a controlled ventricular response.  Normal intervals.  Qs in leads III and aVF.  Poor R-wave progression across leads V1 through V3.  No acute ischemic changes. \par \par 2. Chronic atrial fibrillation:  Patient presents today in a-fib with a controlled ventricular response.  Tolerating Eliquis therapy well.  Will have bloodwork done to assess renal function given that he has now turned 78 and may require a dosing change, should creatinine be at 1.5 or greater.  \par \par Aortic root and ascending aortic dilatation with associated aortic insufficiency:  Clinically stable but will undergo follow up echocardiography prior to his next office visit for reassessment.  \par \par Hyperlipidemia:  No recent bloodwork available for review.  Patient advised on strict low-fat / low-cholesterol diet.  He will continue statin therapy and undergo follow up bloodwork prior to his next office visit. \par \par Interstitial lung disease:  Currently being worked up by Dr. Reyes.  This may be secondary to tobacco use versus possibly his underlying rheumatoid arthritis. \par \par Patient advised to walk as much as possible and if clinically stable, follow up office visit three months.    \par  \par

## 2023-03-08 NOTE — HISTORY OF PRESENT ILLNESS
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 \par

## 2023-05-01 ENCOUNTER — APPOINTMENT (OUTPATIENT)
Dept: MRI IMAGING | Facility: CLINIC | Age: 78
End: 2023-05-01
Payer: MEDICARE

## 2023-05-01 ENCOUNTER — OUTPATIENT (OUTPATIENT)
Dept: OUTPATIENT SERVICES | Facility: HOSPITAL | Age: 78
LOS: 1 days | End: 2023-05-01
Payer: MEDICARE

## 2023-05-01 ENCOUNTER — APPOINTMENT (OUTPATIENT)
Dept: CT IMAGING | Facility: CLINIC | Age: 78
End: 2023-05-01
Payer: MEDICARE

## 2023-05-01 DIAGNOSIS — J84.9 INTERSTITIAL PULMONARY DISEASE, UNSPECIFIED: ICD-10-CM

## 2023-05-01 PROCEDURE — 70551 MRI BRAIN STEM W/O DYE: CPT | Mod: 26,MH

## 2023-05-01 PROCEDURE — 71250 CT THORAX DX C-: CPT | Mod: 26,MH

## 2023-05-01 PROCEDURE — 71250 CT THORAX DX C-: CPT

## 2023-05-01 PROCEDURE — 70551 MRI BRAIN STEM W/O DYE: CPT

## 2023-05-02 ENCOUNTER — APPOINTMENT (OUTPATIENT)
Dept: PULMONOLOGY | Facility: CLINIC | Age: 78
End: 2023-05-02
Payer: MEDICARE

## 2023-05-02 VITALS
BODY MASS INDEX: 28.84 KG/M2 | WEIGHT: 206 LBS | RESPIRATION RATE: 16 BRPM | DIASTOLIC BLOOD PRESSURE: 74 MMHG | HEIGHT: 71 IN | SYSTOLIC BLOOD PRESSURE: 126 MMHG

## 2023-05-02 VITALS — OXYGEN SATURATION: 94 %

## 2023-05-02 PROCEDURE — 99406 BEHAV CHNG SMOKING 3-10 MIN: CPT

## 2023-05-02 PROCEDURE — 99214 OFFICE O/P EST MOD 30 MIN: CPT | Mod: 25

## 2023-05-02 NOTE — PROCEDURE
[FreeTextEntry1] : CXRs progressive ILD 2/17- 8/21\par CT scan 9/22\par mild adenopathy, reticular scarring, septal thickening, traction bronchiectasis, faint GG\par subpleural and basilar predominant\par CT 5/23- no sig change to my review, official pending

## 2023-05-02 NOTE — HISTORY OF PRESENT ILLNESS
[TextBox_4] : smoking x40 yrs, on and off\par now 1/2 ppd\par had cats and dogs, no longer\par works as oral surgeon, active\par no environmental exposures\par has RA, on Enbrel, prednisone 1 mg-- Rheumatology Dr Arash Brown, [phone]\par Had CT scan ordered by Cardiology for aortic root\par found ILD component\par Denies any acute change in pulmonary status, no fever, chill, chest pain\par Has chronic rhinitis\par Does note some exertional dyspnea\par no hx Covid infection, had vaccinations, getting bivalent booster\par \par 5/2/23\par no acute change in status\par no CP or sputum\par no fevers\par had slurred speech , resolved in seconds, MRI recently done\par smoking 1/2 ppd\par remains on 1 mg prednisone followed by Dr Antoine

## 2023-05-02 NOTE — CONSULT LETTER
[Dear  ___] : Dear  [unfilled], [Consult Letter:] : I had the pleasure of evaluating your patient, [unfilled]. [Please see my note below.] : Please see my note below. [Sincerely,] : Sincerely, [DrMerline  ___] : Dr. KLEIN [FreeTextEntry3] : Clarke Reyes DO [facility]\par Pulmonary Critical Care\par Director Pulmonary Division\par Medical Director Respiratory Therapy\par [facility]\par \par

## 2023-05-02 NOTE — CONSULT LETTER
[Dear  ___] : Dear  [unfilled], [Consult Letter:] : I had the pleasure of evaluating your patient, [unfilled]. [Please see my note below.] : Please see my note below. [Sincerely,] : Sincerely, [DrMerline  ___] : Dr. KLEIN [FreeTextEntry3] : [facility] DO [facility]\par Pulmonary Critical Care\par Director Pulmonary Division\par Medical Director Respiratory Therapy\par [facility]\par \par

## 2023-05-02 NOTE — DISCUSSION/SUMMARY
[FreeTextEntry1] : Progressive ILD, known RA on Enbrel/low dose prednisone, smoker,\par Favor NSIP/atypical , ? RB-ILD component,\par Chronic exertional dyspnea\par Underlying HFpEF, Chronic atrial fibrillation, polycythemia by hx\par At baseline, CT chest with no sig change to my review, official pending\par Spoke with is rheumatologist, on Enbrel - he offers no further Rx changes\par Exam with basilar crackles, no desaturation with exercise\par PFTS with mild restriction, moderately impaired DLCO\par Smoking cessation reviewed\par Eventual sleep study , doesn’t want currently\par Discussed treatment options, including anti inflammatory and chronic fibrosing component\par Discussed OFEV use , risks and benefits, he does not wish currently\par Would increase anti inflammatory component - prednisone, he will only go to 2 mg dose\par Would not pursue bx currently given co morbidity/age and known RA/smoking\par 4 months or sooner if needed, with spirometry\par

## 2023-05-02 NOTE — HISTORY OF PRESENT ILLNESS
[TextBox_4] : smoking x40 yrs, on and off\par now 1/2 ppd\par had cats and dogs, no longer\par works as oral surgeon, active\par no environmental exposures\par has RA, on Enbrel, prednisone 1 mg-- Rheumatology Dr Arash Brown, [phone]\par Had CT scan ordered by Cardiology for aortic root\par found ILD component\par Denies any acute change in pulmonary status, no fever, chill, chest pain\par Has chronic rhinitis\par Does note some exertional dyspnea\par no hx Covid infection, had vaccinations, getting bivalent booster\par \par 5/2/23\par no acute change in status\par no CP or sputum\par no fevers\par had slurred speech , resolved in seconds, MRI recently done\par smoking 1/2 ppd\par remains on 1 mg prednisone followed by Dr Antoine

## 2023-05-02 NOTE — PHYSICAL EXAM
[No Acute Distress] : no acute distress [Normal Appearance] : normal appearance [Normal Rate/Rhythm] : normal rate/rhythm [Normal S1, S2] : normal s1, s2 [No Edema] : no edema [No Murmurs] : no murmurs [No Rubs] : no rubs [No Gallops] : no gallops [No Resp Distress] : no resp distress [Normal Rhythm and Effort] : normal rhythm and effort [No Acc Muscle Use] : no acc muscle use [No Abnormalities] : no abnormalities [Normal Color/ Pigmentation] : normal color/ pigmentation [No Focal Deficits] : no focal deficits [Oriented x3] : oriented x3 [Normal Affect] : normal affect [TextBox_68] : basilar  crackles bilaterally

## 2023-05-30 ENCOUNTER — APPOINTMENT (OUTPATIENT)
Dept: CARDIOLOGY | Facility: CLINIC | Age: 78
End: 2023-05-30
Payer: MEDICARE

## 2023-05-30 PROBLEM — I65.23 ATHEROSCLEROSIS OF BOTH CAROTID ARTERIES: Status: ACTIVE | Noted: 2023-05-30

## 2023-05-30 PROCEDURE — 93880 EXTRACRANIAL BILAT STUDY: CPT

## 2023-05-30 PROCEDURE — 93306 TTE W/DOPPLER COMPLETE: CPT

## 2023-06-12 ENCOUNTER — RESULT CHARGE (OUTPATIENT)
Age: 78
End: 2023-06-12

## 2023-06-13 ENCOUNTER — APPOINTMENT (OUTPATIENT)
Dept: CARDIOLOGY | Facility: CLINIC | Age: 78
End: 2023-06-13
Payer: MEDICARE

## 2023-06-13 VITALS
DIASTOLIC BLOOD PRESSURE: 80 MMHG | SYSTOLIC BLOOD PRESSURE: 118 MMHG | BODY MASS INDEX: 28.98 KG/M2 | HEART RATE: 68 BPM | OXYGEN SATURATION: 96 % | WEIGHT: 207 LBS | RESPIRATION RATE: 16 BRPM | HEIGHT: 71 IN

## 2023-06-13 PROCEDURE — 93000 ELECTROCARDIOGRAM COMPLETE: CPT

## 2023-06-13 PROCEDURE — 99214 OFFICE O/P EST MOD 30 MIN: CPT

## 2023-06-15 NOTE — ASSESSMENT
[FreeTextEntry1] : \par 1. EKG today reveals atrial fibrillation with a controlled ventricular response. Non-specific ST-T changes. \par \par 2. Chronic atrial fibrillation: Dr. Juarez remains in atrial fibrillation with a controlled ventricular response. Tolerating Eliquis 5 mg b.i.d. well. \par \par 3. Review of recent bloodwork demonstrates a BUN of 33, creatinine 1.19, H and H 17.9 and 54.6. Patient advised to hydrate and continues to be followed closely by hematology with periodic phlebotomies. \par \par 4. Interstitial lung disease: Clinically stable and being worked up by pulmonary. \par \par 5. Fatigue/dyspnea on exertion: Patient will undergo nuclear stress testing for further evaluation. A recent echo revealed normal left ventricular chamber dimensions and wall motion with preserved ejection fraction estimated at 60-65%. Patient noted to have moderate aortic insufficiency as well as mild mitral and tricuspid regurgitation. Patient will continue current medications. \par  \par

## 2023-06-15 NOTE — HISTORY OF PRESENT ILLNESS
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

## 2023-07-12 ENCOUNTER — APPOINTMENT (OUTPATIENT)
Dept: CARDIOLOGY | Facility: CLINIC | Age: 78
End: 2023-07-12

## 2023-07-19 ENCOUNTER — NON-APPOINTMENT (OUTPATIENT)
Age: 78
End: 2023-07-19

## 2023-09-05 ENCOUNTER — APPOINTMENT (OUTPATIENT)
Dept: PULMONOLOGY | Facility: CLINIC | Age: 78
End: 2023-09-05
Payer: MEDICARE

## 2023-09-05 VITALS — DIASTOLIC BLOOD PRESSURE: 72 MMHG | SYSTOLIC BLOOD PRESSURE: 130 MMHG | RESPIRATION RATE: 16 BRPM

## 2023-09-05 VITALS — HEIGHT: 70 IN | BODY MASS INDEX: 28.63 KG/M2 | WEIGHT: 200 LBS

## 2023-09-05 PROCEDURE — 94010 BREATHING CAPACITY TEST: CPT

## 2023-09-05 PROCEDURE — 99214 OFFICE O/P EST MOD 30 MIN: CPT | Mod: 25

## 2023-09-05 NOTE — DISCUSSION/SUMMARY
[FreeTextEntry1] : Progressive ILD, known RA on Enbrel/low dose prednisone, smoker, Favor NSIP/atypical , ? RB-ILD component, Chronic exertional dyspnea Underlying HFpEF, Chronic atrial fibrillation, polycythemia by hx At baseline, CT chest  stable Spoke with is rheumatologist, on Enbrel - he offers no further Rx changes Exam with basilar crackles, no desaturation with exercise PFTS with mild restriction, moderately impaired DLCO, repeat spirometry today is actually improved FVC is normal Smoking cessation reviewed Eventual sleep study , doesn't want currently Discussed treatment options, including anti inflammatory and chronic fibrosing component Discussed OFEV use , risks and benefits, he does not wish currently continue  anti inflammatory component - prednisone, he will only go to 2 mg dose Would not pursue bx currently given co morbidity/age and known RA/smoking 6 months or sooner if needed with CT scan

## 2023-09-05 NOTE — CONSULT LETTER
[Dear  ___] : Dear  [unfilled], [Consult Letter:] : I had the pleasure of evaluating your patient, [unfilled]. [Please see my note below.] : Please see my note below. [Sincerely,] : Sincerely, [FreeTextEntry3] : Clarke [facility] DO [facility]\par  Pulmonary Critical Care\par  Director Pulmonary Division\par  Medical Director Respiratory Therapy\par  [facility]\par  \par   [DrMerline  ___] : Dr. KLEIN

## 2023-10-10 ENCOUNTER — APPOINTMENT (OUTPATIENT)
Dept: CARDIOLOGY | Facility: CLINIC | Age: 78
End: 2023-10-10
Payer: MEDICARE

## 2023-10-10 PROCEDURE — 93015 CV STRESS TEST SUPVJ I&R: CPT

## 2023-10-10 PROCEDURE — A9500: CPT

## 2023-10-10 PROCEDURE — 78452 HT MUSCLE IMAGE SPECT MULT: CPT

## 2023-10-17 ENCOUNTER — APPOINTMENT (OUTPATIENT)
Dept: CARDIOLOGY | Facility: CLINIC | Age: 78
End: 2023-10-17
Payer: MEDICARE

## 2023-10-17 ENCOUNTER — APPOINTMENT (OUTPATIENT)
Dept: CARDIOLOGY | Facility: CLINIC | Age: 78
End: 2023-10-17

## 2023-10-17 PROCEDURE — ZZZZZ: CPT

## 2023-11-17 NOTE — HISTORY OF PRESENT ILLNESS
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

## 2023-11-19 ENCOUNTER — RX RENEWAL (OUTPATIENT)
Age: 78
End: 2023-11-19

## 2023-11-28 ENCOUNTER — APPOINTMENT (OUTPATIENT)
Dept: CARDIOLOGY | Facility: CLINIC | Age: 78
End: 2023-11-28
Payer: MEDICARE

## 2023-11-28 ENCOUNTER — NON-APPOINTMENT (OUTPATIENT)
Age: 78
End: 2023-11-28

## 2023-11-28 VITALS
SYSTOLIC BLOOD PRESSURE: 140 MMHG | DIASTOLIC BLOOD PRESSURE: 78 MMHG | HEIGHT: 70 IN | BODY MASS INDEX: 28.06 KG/M2 | HEART RATE: 77 BPM | RESPIRATION RATE: 16 BRPM | WEIGHT: 196 LBS

## 2023-11-28 DIAGNOSIS — M06.9 RHEUMATOID ARTHRITIS, UNSPECIFIED: ICD-10-CM

## 2023-11-28 PROCEDURE — 99214 OFFICE O/P EST MOD 30 MIN: CPT

## 2023-11-28 PROCEDURE — 93000 ELECTROCARDIOGRAM COMPLETE: CPT

## 2023-11-28 RX ORDER — ETANERCEPT 50 MG/ML
50 SOLUTION SUBCUTANEOUS
Refills: 0 | Status: DISCONTINUED | COMMUNITY
End: 2023-11-28

## 2023-12-05 RX ORDER — APIXABAN 5 MG/1
5 TABLET, FILM COATED ORAL
Qty: 180 | Refills: 3 | Status: ACTIVE | COMMUNITY
Start: 2021-11-10 | End: 1900-01-01

## 2024-01-17 ENCOUNTER — RX RENEWAL (OUTPATIENT)
Age: 79
End: 2024-01-17

## 2024-01-23 ENCOUNTER — OUTPATIENT (OUTPATIENT)
Dept: OUTPATIENT SERVICES | Facility: HOSPITAL | Age: 79
LOS: 1 days | End: 2024-01-23
Payer: MEDICARE

## 2024-01-23 ENCOUNTER — APPOINTMENT (OUTPATIENT)
Dept: CT IMAGING | Facility: CLINIC | Age: 79
End: 2024-01-23
Payer: MEDICARE

## 2024-01-23 DIAGNOSIS — J84.9 INTERSTITIAL PULMONARY DISEASE, UNSPECIFIED: ICD-10-CM

## 2024-01-23 PROCEDURE — 71250 CT THORAX DX C-: CPT

## 2024-01-23 PROCEDURE — 71250 CT THORAX DX C-: CPT | Mod: 26,MH

## 2024-01-23 RX ORDER — KIT FOR THE PREPARATION OF TECHNETIUM TC99M SESTAMIBI 1 MG/5ML
INJECTION, POWDER, LYOPHILIZED, FOR SOLUTION PARENTERAL
Refills: 0 | Status: COMPLETED | OUTPATIENT
Start: 2024-01-23

## 2024-01-23 RX ADMIN — KIT FOR THE PREPARATION OF TECHNETIUM TC99M SESTAMIBI 0: 1 INJECTION, POWDER, LYOPHILIZED, FOR SOLUTION PARENTERAL at 00:00

## 2024-02-02 ENCOUNTER — NON-APPOINTMENT (OUTPATIENT)
Age: 79
End: 2024-02-02

## 2024-02-04 ENCOUNTER — NON-APPOINTMENT (OUTPATIENT)
Age: 79
End: 2024-02-04

## 2024-02-28 ENCOUNTER — RX RENEWAL (OUTPATIENT)
Age: 79
End: 2024-02-28

## 2024-02-29 ENCOUNTER — APPOINTMENT (OUTPATIENT)
Dept: CARDIOLOGY | Facility: CLINIC | Age: 79
End: 2024-02-29
Payer: MEDICARE

## 2024-02-29 VITALS
WEIGHT: 202 LBS | HEIGHT: 72 IN | DIASTOLIC BLOOD PRESSURE: 70 MMHG | HEART RATE: 72 BPM | SYSTOLIC BLOOD PRESSURE: 124 MMHG | RESPIRATION RATE: 16 BRPM | BODY MASS INDEX: 27.36 KG/M2

## 2024-02-29 DIAGNOSIS — D75.1 SECONDARY POLYCYTHEMIA: ICD-10-CM

## 2024-02-29 PROCEDURE — 93000 ELECTROCARDIOGRAM COMPLETE: CPT

## 2024-02-29 PROCEDURE — G2211 COMPLEX E/M VISIT ADD ON: CPT

## 2024-02-29 PROCEDURE — 99215 OFFICE O/P EST HI 40 MIN: CPT

## 2024-03-01 NOTE — HISTORY OF PRESENT ILLNESS
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 He admits to being "lazy" and is currently not exercising at all.

## 2024-03-01 NOTE — ASSESSMENT
[FreeTextEntry1] : 1. EKG today demonstrates atrial fibrillation with a controlled ventricular response. Qs in leads III and aVF. Non-specific ST-T changes.   2. Chronic atrial fibrillation: Patient remains in a-fib with a controlled ventricular response. Tolerating Eliquis therapy well. Will keep a close watch on patient's renal function. If creatinine exceeds 1.5 mg/dl, will need a dose reduction to 2.5 mg b.i.d.   3. Dilatation of the aortic root and ascending aorta: According to a recent chest CT performed for lung surveillance by Dr. Reyes, the aortic root is measuring somewhere between 5.1 and 5.2 cm at the level of the sinus of Valsalva. The ascending aorta is measuring 4.0 cm at the level of the main pulmonary artery. This is in sharp contrast to our most recent echocardiogram performed May 30, 2023, which revealed an aortic root of 4.3 cm and an ascending aorta of 4.5 cm. At this time, I will review the patient's chest CT scan and schedule a follow-up echocardiogram for comparative purposes.   4. Renal insufficiency: Patient with creatinine 1.38, BUN 26.5, and EGFR of 49.6. I have advised hydration which the patient admits not to be doing well and will refer to nephrology for an initial evaluation.   5. Elevated PSA: Patient with a chronically elevated PSA of 6.63. Followed by Dr. Sewell. These numbers are stable.   6. Polycythemia: Patient with hemoglobin 18.1 and hematocrit of 55.5. Patient undergoing periodic phlebotomies with hematology. Given the patient's physical in activity, have recommended that he begin gradual exercise including swimming at the local [facility].   7. Hyperlipidemia: Recent bloodwork demonstrates total cholesterol of 140, HDL 67, LDL 61, TC/HDL ratio 2.1, triglycerides 81. Patient advised to continue statin therapy as well as follow a strict low-fat / low-cholesterol diet. Follow up office visit three months.

## 2024-03-01 NOTE — PHYSICAL EXAM
[Well Developed] : well developed [No Acute Distress] : no acute distress [Normal Conjunctiva] : normal conjunctiva [Normal Venous Pressure] : normal venous pressure [No Carotid Bruit] : no carotid bruit [No Rub] : no rub [No Gallop] : no gallop [Clear Lung Fields] : clear lung fields [No Respiratory Distress] : no respiratory distress  [Normal Bowel Sounds] : normal bowel sounds [Normal Gait] : normal gait [No Edema] : no edema [No Rash] : no rash [Moves all extremities] : moves all extremities [No Focal Deficits] : no focal deficits [Normal Speech] : normal speech [Alert and Oriented] : alert and oriented [Normal memory] : normal memory [de-identified] : Overweight [de-identified] : Irregularly irregular rhythm, I-II/VI systolic murmur

## 2024-03-01 NOTE — REASON FOR VISIT
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

## 2024-03-05 ENCOUNTER — APPOINTMENT (OUTPATIENT)
Dept: PULMONOLOGY | Facility: CLINIC | Age: 79
End: 2024-03-05
Payer: MEDICARE

## 2024-03-05 VITALS — WEIGHT: 201 LBS | BODY MASS INDEX: 27.26 KG/M2

## 2024-03-05 VITALS — DIASTOLIC BLOOD PRESSURE: 80 MMHG | RESPIRATION RATE: 16 BRPM | SYSTOLIC BLOOD PRESSURE: 120 MMHG

## 2024-03-05 VITALS — OXYGEN SATURATION: 97 %

## 2024-03-05 DIAGNOSIS — R59.0 LOCALIZED ENLARGED LYMPH NODES: ICD-10-CM

## 2024-03-05 PROCEDURE — G2211 COMPLEX E/M VISIT ADD ON: CPT

## 2024-03-05 PROCEDURE — 99214 OFFICE O/P EST MOD 30 MIN: CPT

## 2024-03-05 NOTE — CONSULT LETTER
[Dear  ___] : Dear  [unfilled], [Consult Letter:] : I had the pleasure of evaluating your patient, [unfilled]. [Please see my note below.] : Please see my note below. [Sincerely,] : Sincerely, [DrMerline  ___] : Dr. KLEIN [FreeTextEntry3] : [facility] DO [facility]\par  Pulmonary Critical Care\par  Director Pulmonary Division\par  Medical Director Respiratory Therapy\par  [facility]\par  \par

## 2024-03-05 NOTE — COUNSELING
[Yes] : Risk of tobacco use and health benefits of smoking cessation discussed: Yes [Cessation strategies including cessation program discussed] : Cessation strategies including cessation program discussed [Encouraged to pick a quit date and identify support needed to quit] : Encouraged to pick a quit date and identify support needed to quit [Use of nicotine replacement therapies and other medications discussed] : Use of nicotine replacement therapies and other medications discussed [Smoking Cessation Program Referral] : Smoking Cessation Program Referral  [FreeTextEntry1] : 4

## 2024-03-05 NOTE — PHYSICAL EXAM
[No Acute Distress] : no acute distress [Normal Appearance] : normal appearance [Normal S1, S2] : normal s1, s2 [Normal Rate/Rhythm] : normal rate/rhythm [No Edema] : no edema [No Murmurs] : no murmurs [No Resp Distress] : no resp distress [No Rubs] : no rubs [No Gallops] : no gallops [No Acc Muscle Use] : no acc muscle use [Normal Rhythm and Effort] : normal rhythm and effort [No Abnormalities] : no abnormalities [Normal Color/ Pigmentation] : normal color/ pigmentation [No Focal Deficits] : no focal deficits [Normal Affect] : normal affect [Oriented x3] : oriented x3 [TextBox_68] : basilar  crackles bilaterally

## 2024-03-05 NOTE — DISCUSSION/SUMMARY
[FreeTextEntry1] : Progressive ILD, known RA on Plaquenil/low dose prednisone, smoker, Favor Indeterminant UIP, favor NSIP/atypical , ? RB-ILD component, Chronic exertional dyspnea- at baseline Underlying HFpEF, Chronic atrial fibrillation, polycythemia by hx At baseline, CT chest  stable 1/24-1/23 Spoke with is rheumatologist, Plaquenil- he offers no further Rx changes Exam with basilar crackles, no desaturation with exercise PFTS with mild restriction, moderately impaired DLCO, repeat spirometry today is actually improved FVC is normal Smoking cessation reviewed Eventual sleep study , doesn't want currently Discussed treatment options, including anti inflammatory and chronic fibrosing component Discussed OFEV use , risks and benefits, he does not wish currently continue  anti inflammatory component - prednisone, he will only go to 2 mg dose Would not pursue bx currently given co morbidity/age and known RA/smoking 4 months or sooner if needed with [facility]ne Cardiology following aorta Will get input from Dr Pereira ILD specialist, pt agree able

## 2024-03-05 NOTE — HISTORY OF PRESENT ILLNESS
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

## 2024-03-05 NOTE — PROCEDURE
[FreeTextEntry1] : CXRs progressive ILD 2/17- 8/21 CT scan 9/22 mild adenopathy, reticular scarring, septal thickening, traction bronchiectasis, faint GG subpleural and basilar predominant CT 1/24-1/23 no sig change spirometry 9/23 normal, FVC sig improved from 2/23

## 2024-03-07 ENCOUNTER — APPOINTMENT (OUTPATIENT)
Dept: PULMONOLOGY | Facility: CLINIC | Age: 79
End: 2024-03-07
Payer: MEDICARE

## 2024-03-07 VITALS
RESPIRATION RATE: 16 BRPM | HEART RATE: 78 BPM | BODY MASS INDEX: 27.77 KG/M2 | TEMPERATURE: 97.6 F | SYSTOLIC BLOOD PRESSURE: 130 MMHG | WEIGHT: 205 LBS | DIASTOLIC BLOOD PRESSURE: 80 MMHG | HEIGHT: 72 IN

## 2024-03-07 PROCEDURE — 99215 OFFICE O/P EST HI 40 MIN: CPT

## 2024-03-11 NOTE — HISTORY OF PRESENT ILLNESS
[TextBox_4] : Ms Juarez is an 79 year old male with history of RA and ILD presenting for evaluation.  He reports being diagnosed with RA around 20 years ago in the setting of morning stiffness. He was Initially on Enbrel which was ultimately discontinued in the past year or so. He has since been transitioned to Plaquenil. He is also on low dose prednisone, currently at 2 mg. 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   In the past 2 years he was referred to Dr Reyes as he had a screening CT showing evidence of interstitial lung disease. This was done as routine screening. He denies ever experiencing any pulmonary symptoms though overall just feels like he is physically deteriorating since having stopped working 2 years ago. Has difficulty going up the stairs but he he does not feel limited by his breathing.   With regards to active RA symptoms, he reports Raynauds and morning stiffness. . 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 Currently smoking 1/2- 3/4 PPD Started smoking in 1966 for 2 years then resumed again 20 years ago He has never been on steroids for the lungs per se. He has been living in the same home for the past 22 years. He has some mild water damage and mold in the bathroom- he recently sold him and is planning on moving shortly. No family history of pulm fibrosis or autoimmune disease.

## 2024-03-11 NOTE — ASSESSMENT
[FreeTextEntry1] : Mr Juarez is an 79 year old male with rheumatoid arthritis as well as CT showing evidence of interstitial lung disease. The CT scan pattern is indeterminate for UIP pattern given the degree of ground glass though there is evidence of traction bronchiolectasis as well as airway thickening. 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  While there is a moderate amount of disease on CT scan, it has been largely stable for the past 2 years based on imaging as well as PFTs. He is largely asymptomatic as well and is largely not amenable to starting any medication at this time. He is off Enbrel now for over a year now- there is data to suggest TNF alpha inhibitors may cause progression of ILD- which may explain the current stability.  At this time patient does not want to trial any medications to help slow progression of disease. We discussed in depth smoking cessation and its risk factor in developing and worsening ILD - he feels like he can do this without any NRT or medication to help with this at this time.   There is also evidence of PH though left sided disease as well. This can be continued to be monitored or consider work up in the setting of RA.

## 2024-03-11 NOTE — PHYSICAL EXAM
[No Acute Distress] : no acute distress [Well Nourished] : well nourished [No Deformities] : no deformities [Well Developed] : well developed [Normal Oropharynx] : normal oropharynx [No Neck Mass] : no neck mass [Normal Appearance] : normal appearance [Normal S1, S2] : normal s1, s2 [Normal Rate/Rhythm] : normal rate/rhythm [No Murmurs] : no murmurs [Benign] : benign [TextBox_68] : crackles at the bases bilaterally, no wheezing [TextBox_105] : Mild pitting ara

## 2024-03-15 ENCOUNTER — APPOINTMENT (OUTPATIENT)
Dept: CARDIOLOGY | Facility: CLINIC | Age: 79
End: 2024-03-15
Payer: MEDICARE

## 2024-03-15 PROCEDURE — 93306 TTE W/DOPPLER COMPLETE: CPT

## 2024-03-27 RX ORDER — POTASSIUM CHLORIDE 750 MG/1
10 TABLET, FILM COATED, EXTENDED RELEASE ORAL DAILY
Qty: 90 | Refills: 3 | Status: ACTIVE | COMMUNITY
Start: 2022-03-29 | End: 1900-01-01

## 2024-05-06 ENCOUNTER — APPOINTMENT (OUTPATIENT)
Dept: NEUROLOGY | Facility: CLINIC | Age: 79
End: 2024-05-06
Payer: MEDICARE

## 2024-05-06 VITALS
DIASTOLIC BLOOD PRESSURE: 70 MMHG | BODY MASS INDEX: 27.09 KG/M2 | SYSTOLIC BLOOD PRESSURE: 116 MMHG | WEIGHT: 200 LBS | HEIGHT: 72 IN

## 2024-05-06 DIAGNOSIS — R25.1 TREMOR, UNSPECIFIED: ICD-10-CM

## 2024-05-06 DIAGNOSIS — M21.371 FOOT DROP, RIGHT FOOT: ICD-10-CM

## 2024-05-06 PROCEDURE — 99204 OFFICE O/P NEW MOD 45 MIN: CPT

## 2024-05-06 NOTE — HISTORY OF PRESENT ILLNESS
[FreeTextEntry1] : This 79-year-old right-handed man was seen in neurological consultation today He reports shaking of his hands going on for about the last 2 years and remaining fairly stable, possibly slowly progressive.  Left hand is more involved than the right hand. He notices that shaking more when using the hands or holding something and not at rest.  He has no trouble carrying out all activities. When asking him about ambulation he does say in passing that is he has had a right foot drop for the last few months.  Has no significant back pain.  Does report a lot of leg crossing. Does not drink any caffeinated beverages. Believes he has had his thyroid functions checked as he recently had extensive blood work.  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

## 2024-05-06 NOTE — PHYSICAL EXAM
[FreeTextEntry1] : No spinal palpation tenderness Full range of motion lumbar spine  Mild postural tremor of the arms. No cogwheeling or bradykinesia. No resting tremor is seen.  Tinel's sign negative at the fibular head. [General Appearance - Alert] : alert [General Appearance - In No Acute Distress] : in no acute distress [General Appearance - Well-Appearing] : healthy appearing [Oriented To Time, Place, And Person] : oriented to person, place, and time [Impaired Insight] : insight and judgment were intact [Affect] : the affect was normal [Memory Recent] : recent memory was not impaired [Person] : oriented to person [Place] : oriented to place [Time] : oriented to time [Concentration Intact] : normal concentrating ability [Fluency] : fluency intact [Comprehension] : comprehension intact [Cranial Nerves Optic (II)] : visual acuity intact bilaterally,  visual fields full to confrontation, pupils equal round and reactive to light [Cranial Nerves Oculomotor (III)] : extraocular motion intact [Cranial Nerves Trigeminal (V)] : facial sensation intact symmetrically [Cranial Nerves Facial (VII)] : face symmetrical [Cranial Nerves Vestibulocochlear (VIII)] : hearing was intact bilaterally [Cranial Nerves Glossopharyngeal (IX)] : tongue and palate midline [Cranial Nerves Accessory (XI - Cranial And Spinal)] : head turning and shoulder shrug symmetric [Cranial Nerves Hypoglossal (XII)] : there was no tongue deviation with protrusion [Motor Tone] : muscle tone was normal in all four extremities [No Muscle Atrophy] : normal bulk in all four extremities [Paresis Pronator Drift Right-Sided] : no pronator drift on the right [Paresis Pronator Drift Left-Sided] : no pronator drift on the left [Sensation Tactile Decrease] : light touch was intact [Sensation Pain / Temperature Decrease] : pain and temperature was intact [Proprioception] : proprioception was intact [Romberg's Sign] : Romberg's sign was negtive [Balance] : balance was intact [Past-pointing] : there was no past-pointing [Tremor] : a tremor present [Coordination - Dysmetria Impaired Finger-to-Nose Bilateral] : not present [2+] : Brachioradialis left 2+ [1+] : Patella left 1+ [0] : Ankle jerk left 0 [FreeTextEntry6] : Mild to moderate weakness of right foot dorsi flexion and eversion, plantarflexion and inversion normal. [FreeTextEntry8] : Gait with mild steppage on the right, mildly decreased arm swing bilaterally. [PERRL With Normal Accommodation] : pupils were equal in size, round, reactive to light, with normal accommodation [Extraocular Movements] : extraocular movements were intact [Full Visual Field] : full visual field

## 2024-05-06 NOTE — ASSESSMENT
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

## 2024-05-31 ENCOUNTER — APPOINTMENT (OUTPATIENT)
Dept: CARDIOLOGY | Facility: CLINIC | Age: 79
End: 2024-05-31
Payer: MEDICARE

## 2024-05-31 VITALS
WEIGHT: 200 LBS | SYSTOLIC BLOOD PRESSURE: 112 MMHG | RESPIRATION RATE: 16 BRPM | HEART RATE: 63 BPM | HEIGHT: 78 IN | BODY MASS INDEX: 23.14 KG/M2 | DIASTOLIC BLOOD PRESSURE: 80 MMHG

## 2024-05-31 DIAGNOSIS — I77.810 THORACIC AORTIC ECTASIA: ICD-10-CM

## 2024-05-31 PROCEDURE — 93000 ELECTROCARDIOGRAM COMPLETE: CPT

## 2024-05-31 PROCEDURE — G2211 COMPLEX E/M VISIT ADD ON: CPT

## 2024-05-31 PROCEDURE — 99214 OFFICE O/P EST MOD 30 MIN: CPT

## 2024-06-03 ENCOUNTER — APPOINTMENT (OUTPATIENT)
Dept: CT IMAGING | Facility: CLINIC | Age: 79
End: 2024-06-03

## 2024-06-03 ENCOUNTER — OUTPATIENT (OUTPATIENT)
Dept: OUTPATIENT SERVICES | Facility: HOSPITAL | Age: 79
LOS: 1 days | End: 2024-06-03
Payer: MEDICARE

## 2024-06-03 DIAGNOSIS — I77.810 THORACIC AORTIC ECTASIA: ICD-10-CM

## 2024-06-03 PROCEDURE — 71275 CT ANGIOGRAPHY CHEST: CPT

## 2024-06-03 PROCEDURE — 71275 CT ANGIOGRAPHY CHEST: CPT | Mod: 26,MH

## 2024-06-03 NOTE — PHYSICAL EXAM
[Well Developed] : well developed [No Acute Distress] : no acute distress [Normal Conjunctiva] : normal conjunctiva [Normal Venous Pressure] : normal venous pressure [No Carotid Bruit] : no carotid bruit [No Rub] : no rub [No Gallop] : no gallop [Clear Lung Fields] : clear lung fields [No Respiratory Distress] : no respiratory distress  [Normal Bowel Sounds] : normal bowel sounds [Normal Gait] : normal gait [No Edema] : no edema [No Rash] : no rash [Moves all extremities] : moves all extremities [No Focal Deficits] : no focal deficits [Normal Speech] : normal speech [Alert and Oriented] : alert and oriented [Normal memory] : normal memory [de-identified] : Overweight [de-identified] : Irregularly irregular rhythm, I-II/VI systolic murmur

## 2024-06-03 NOTE — HISTORY OF PRESENT ILLNESS
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 He has developed a slight intentional tremor and has seen neurology for it. Recent echocardiography also performed to assess his aortic root and ascending aorta.

## 2024-06-03 NOTE — ASSESSMENT
[FreeTextEntry1] : 1. EKG today reveals atrial fibrillation with a controlled ventricular response. Left axis deviation. Non-specific ST-T changes.   2. 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 There is severe dilatation of the left atrium. Mild dilatation of the right atrium and right ventricles noted as well. The aortic root at the sinus of Valsalva is dilated measuring 4.85 cm which is as significant change when compared to prior echo of May 30, 2023, at which time the root diameter was approximately 4.3 cm. The ascending aorta has also dilated further from approximately 4.5 to 4.7 cm in the same time interval.   3. Given the above-noted changes, I have recommended CT angiography of the chest/aorta with and without contrast. Additionally, patient will be referred to Dr. Bladimir Blount for evaluation. In the meantime, patient is advised to avoid unnecessary heavy lifting (objects greater than 20 lbs. Blood pressure appears well controlled at this time. Advised to remain well-hydrated in preparation for his CAT scan.   4. Chronic atrial fibrillation: Patient remains in a-fib with a controlled ventricular response. Tolerating Eliquis therapy well.  5. Intentional tremor: This appears to be quite mild at this time. May benefit from switch from Metoprolol to Propanalol, but will await the thoracic aortic evaluation prior to attempting a switch.  If clinically stable from a cardiac standpoint, follow-up here three months.

## 2024-06-05 ENCOUNTER — APPOINTMENT (OUTPATIENT)
Dept: CARDIOTHORACIC SURGERY | Facility: CLINIC | Age: 79
End: 2024-06-05
Payer: MEDICARE

## 2024-06-05 VITALS
TEMPERATURE: 97.8 F | HEART RATE: 78 BPM | OXYGEN SATURATION: 94 % | HEIGHT: 72 IN | WEIGHT: 200 LBS | RESPIRATION RATE: 16 BRPM | DIASTOLIC BLOOD PRESSURE: 80 MMHG | BODY MASS INDEX: 27.09 KG/M2 | SYSTOLIC BLOOD PRESSURE: 121 MMHG

## 2024-06-05 DIAGNOSIS — I48.91 UNSPECIFIED ATRIAL FIBRILLATION: ICD-10-CM

## 2024-06-05 DIAGNOSIS — E78.00 PURE HYPERCHOLESTEROLEMIA, UNSPECIFIED: ICD-10-CM

## 2024-06-05 DIAGNOSIS — J84.9 INTERSTITIAL PULMONARY DISEASE, UNSPECIFIED: ICD-10-CM

## 2024-06-05 DIAGNOSIS — F17.219 NICOTINE DEPENDENCE, CIGARETTES, WITH UNSPECIFIED NICOTINE-INDUCED DISORDERS: ICD-10-CM

## 2024-06-05 DIAGNOSIS — I77.810 THORACIC AORTIC ECTASIA: ICD-10-CM

## 2024-06-05 DIAGNOSIS — I10 ESSENTIAL (PRIMARY) HYPERTENSION: ICD-10-CM

## 2024-06-05 DIAGNOSIS — Z86.39 PERSONAL HISTORY OF OTHER ENDOCRINE, NUTRITIONAL AND METABOLIC DISEASE: ICD-10-CM

## 2024-06-05 DIAGNOSIS — I35.1 NONRHEUMATIC AORTIC (VALVE) INSUFFICIENCY: ICD-10-CM

## 2024-06-05 PROCEDURE — 99203 OFFICE O/P NEW LOW 30 MIN: CPT

## 2024-06-05 RX ORDER — ETANERCEPT 50 MG/ML
50 SOLUTION SUBCUTANEOUS
Refills: 0 | Status: ACTIVE | COMMUNITY
Start: 2024-06-05

## 2024-06-05 RX ORDER — PREDNISONE 1 MG/1
1 TABLET ORAL DAILY
Qty: 180 | Refills: 0 | Status: ACTIVE | COMMUNITY

## 2024-06-05 RX ORDER — HYDROXYCHLOROQUINE SULFATE 100 MG/1
100 TABLET ORAL TWICE DAILY
Refills: 0 | Status: ACTIVE | COMMUNITY

## 2024-06-05 RX ORDER — METOPROLOL SUCCINATE 50 MG/1
50 TABLET, EXTENDED RELEASE ORAL
Refills: 0 | Status: ACTIVE | COMMUNITY
Start: 2019-02-25

## 2024-06-06 NOTE — CONSULT LETTER
[Dear  ___] : Dear  [unfilled], [Courtesy Letter:] : I had the pleasure of seeing your patient, [unfilled], in my office today. [Please see my note below.] : Please see my note below. [Consult Closing:] : Thank you very much for allowing me to participate in the care of this patient.  If you have any questions, please do not hesitate to contact me. [Sincerely,] : Sincerely, [FreeTextEntry2] : Dr. Theodore Judd, [FreeTextEntry1] : Please find attached our consultation on your patient, Mr. BARROW  We take a multidisciplinary team approach to patient care and consider you, the referring physician, an extension of our team. We will maintain an open line of communication with you throughout your patient's treatment course.  It is our commitment to provide your patient with the highest quality of advanced therapeutic options. We thank you for allowing us to participate in the care of your patient. Please do not hesitate to contact our team with any questions or concerns at [phone].     Thank you for allowing us to participate in this patients care. [FreeTextEntry3] : Bladimir Blount M.D. Professor of Cardiovascular and Thoracic Surgery Minimally Invasive Valve Surgeon Director of Aortic Surgery, [facility] Cell: [phone] Email: [email or web address]

## 2024-06-06 NOTE — HISTORY OF PRESENT ILLNESS
[FreeTextEntry1] : 79-year-old male with a past medical history significant for chronic atrial fibrillation, hyperlipidemia, rheumatoid arthritis, polycythemia, tobacco use, and progressive interstitial lung disease, presents for an initial evaluation and management of dilatation of his aortic root and ascending aorta associated with aortic insufficiency.  Today he presents and reports that he has pedal edema x yrs on lasix for that. He can walk up to 2 blocks but his legs get tired and can climb 2 FOS. He uses 1 pillow to sleep. He is been followed by Dr. Morales for aortic insufficiency x yrs. Denies any chest pain, shortness of breath, palpitations, dizziness or PND.   Family history : maternal aunt had ? TAA/AAA aneurysm s/p repair at the age of 80 but did not make it   CT non contrast 1/23/24 1.  Bilateral reticular opacities, reticular opacities, and bronchiectasis lower lobe predominance can occur in the setting of interstitial lung disease. 2.  The aortic root measures 5.1 cm at the sinuses of Valsalva.   	 TRANSTHORACIC ECHOCARDIOGRAM 3/15/24 1. Left ventricular cavity is mildly dilated. Left ventricular systolic function is normal with an ejection fraction visually estimated at 55 to 60 %. 2. Mild left ventricular hypertrophy. 3. Mildly enlarged right ventricular cavity size and normal systolic function. 4. The left atrium is severely dilated. 5. The right atrium is mildly dilated. 6. Aortic root at the sinuses of Valsalva is dilated, measuring 4.85 cm (indexed 2.25 cm/m). Ascending aorta diameter is dilated, measuring 4.70 cm (indexed 2.18 cm/m). 7. Fibrocalcific aortic valve sclerosis without stenosis. 8. Moderate aortic regurgitation. 9. Thickened mitral valve leaflets. 10. Mild mitral regurgitation. 11. Mild tricuspid regurgitation. 12. Estimated pulmonary artery systolic pressure is 39 mmHg, consistent with mild pulmonary hypertension. 13. No pericardial effusion seen.  CTA chest 6/3/24

## 2024-06-06 NOTE — ASSESSMENT
[FreeTextEntry1] :  79-year-old male with a past medical history significant for chronic atrial fibrillation, hyperlipidemia, rheumatoid arthritis, polycythemia, tobacco use, and progressive interstitial lung disease, presents for an initial evaluation and management of dilatation of his aortic root and ascending aorta associated with aortic insufficiency.  The patient's medical records and diagnostic images were reviewed at the time of this office visit, and the following recommendation was made. Patient does not meet criteria for surgical intervention at the time and will be entered into the aortic registry surveillance program.  Plan  1. [facility] with [facility] Chest  2. Continue medication regimen. 3. Follow up with cardiologist and PCP. 4. BP control- I have recommended the patient to monitor his blood pressure closely. I have also advised the patient to take daily blood pressures at home and adhere to medication regimen. 5. Discussed signs and symptoms that warrant emergency medical attention

## 2024-06-06 NOTE — END OF VISIT
[FreeTextEntry3] :  I, ELISHA Hurt , personally performed the evaluation and management (E/M) services for this new  patient. That E/M includes conducting the initial examination, assessing all conditions, and establishing the plan of care. Today, CARLOS CERVANTES  was here to observe my evaluation and management services for this patient.

## 2024-06-06 NOTE — PHYSICAL EXAM
[Systolic grade ___/6] : A grade [unfilled]/6 systolic murmur was heard. [General Appearance - Alert] : alert [General Appearance - In No Acute Distress] : in no acute distress [General Appearance - Well Nourished] : well nourished [General Appearance - Well Developed] : well developed [Sclera] : the sclera and conjunctiva were normal [PERRL With Normal Accommodation] : pupils were equal in size, round, and reactive to light [Outer Ear] : the ears and nose were normal in appearance [Hearing Threshold Finger Rub Not Harmon] : hearing was normal [Both Tympanic Membranes Were Examined] : both tympanic membranes were normal [Neck Appearance] : the appearance of the neck was normal [] : no respiratory distress [Respiration, Rhythm And Depth] : normal respiratory rhythm and effort [Auscultation Breath Sounds / Voice Sounds] : lungs were clear to auscultation bilaterally [Apical Impulse] : the apical impulse was normal [Heart Rate And Rhythm] : heart rate was normal and rhythm regular [Heart Sounds] : normal S1 and S2 [Examination Of The Chest] : the chest was normal in appearance [2+] : left 2+ [Breast Appearance] : normal in appearance [Bowel Sounds] : normal bowel sounds [Abdomen Soft] : soft [No CVA Tenderness] : no ~M costovertebral angle tenderness [Involuntary Movements] : no involuntary movements were seen [Abnormal Walk] : normal gait [Skin Color & Pigmentation] : normal skin color and pigmentation [Skin Turgor] : normal skin turgor [No Focal Deficits] : no focal deficits [Oriented To Time, Place, And Person] : oriented to person, place, and time [Impaired Insight] : insight and judgment were intact [Affect] : the affect was normal [Mood] : the mood was normal [Memory Recent] : recent memory was not impaired [Memory Remote] : remote memory was not impaired [FreeTextEntry1] : Deferred

## 2024-06-10 ENCOUNTER — APPOINTMENT (OUTPATIENT)
Dept: ULTRASOUND IMAGING | Facility: CLINIC | Age: 79
End: 2024-06-10
Payer: MEDICARE

## 2024-06-10 ENCOUNTER — OUTPATIENT (OUTPATIENT)
Dept: OUTPATIENT SERVICES | Facility: HOSPITAL | Age: 79
LOS: 1 days | End: 2024-06-10
Payer: MEDICARE

## 2024-06-10 DIAGNOSIS — Z00.8 ENCOUNTER FOR OTHER GENERAL EXAMINATION: ICD-10-CM

## 2024-06-10 PROCEDURE — 76775 US EXAM ABDO BACK WALL LIM: CPT | Mod: 26

## 2024-06-10 PROCEDURE — 76775 US EXAM ABDO BACK WALL LIM: CPT

## 2024-06-11 ENCOUNTER — INPATIENT (INPATIENT)
Facility: HOSPITAL | Age: 79
LOS: 0 days | Discharge: HOME | DRG: 66 | End: 2024-06-12
Attending: SURGERY | Admitting: SURGERY
Payer: MEDICARE

## 2024-06-11 VITALS — HEIGHT: 68 IN | WEIGHT: 202.6 LBS

## 2024-06-11 PROCEDURE — 99285 EMERGENCY DEPT VISIT HI MDM: CPT

## 2024-06-11 RX ORDER — TETANUS TOXOID, REDUCED DIPHTHERIA TOXOID AND ACELLULAR PERTUSSIS VACCINE, ADSORBED 5; 2.5; 8; 8; 2.5 [IU]/.5ML; [IU]/.5ML; UG/.5ML; UG/.5ML; UG/.5ML
0.5 SUSPENSION INTRAMUSCULAR ONCE
Refills: 0 | Status: COMPLETED | OUTPATIENT
Start: 2024-06-11 | End: 2024-06-11

## 2024-06-11 NOTE — ED ADULT TRIAGE NOTE - CHIEF COMPLAINT QUOTE
patient reports trip and fall backwards on driveway, hitting head on ground. +LOC. on eliquis. does not remember how long he was down for.   bump to back of head, abrasions to right forearm bleeding, wrapped at triage. reports his balance was off and he couldn't catch himself prior to falling.   ambulatory to triage, alert and oriented x 4, perrl, respirations even and unlabored, ms strength 5/5 upper and lower ext bilaterally.   neuro alert called at triage.

## 2024-06-12 ENCOUNTER — TRANSCRIPTION ENCOUNTER (OUTPATIENT)
Age: 79
End: 2024-06-12

## 2024-06-12 VITALS
RESPIRATION RATE: 26 BRPM | OXYGEN SATURATION: 97 % | SYSTOLIC BLOOD PRESSURE: 119 MMHG | HEART RATE: 82 BPM | DIASTOLIC BLOOD PRESSURE: 77 MMHG

## 2024-06-12 DIAGNOSIS — Z98.49 CATARACT EXTRACTION STATUS, UNSPECIFIED EYE: Chronic | ICD-10-CM

## 2024-06-12 DIAGNOSIS — Z90.49 ACQUIRED ABSENCE OF OTHER SPECIFIED PARTS OF DIGESTIVE TRACT: Chronic | ICD-10-CM

## 2024-06-12 DIAGNOSIS — I62.00 NONTRAUMATIC SUBDURAL HEMORRHAGE, UNSPECIFIED: ICD-10-CM

## 2024-06-12 LAB
ABO RH CONFIRMATION: SIGNIFICANT CHANGE UP
ALBUMIN SERPL ELPH-MCNC: 3.3 G/DL — SIGNIFICANT CHANGE UP (ref 3.3–5)
ALP SERPL-CCNC: 75 U/L — SIGNIFICANT CHANGE UP (ref 40–120)
ALT FLD-CCNC: 30 U/L — SIGNIFICANT CHANGE UP (ref 12–78)
ANION GAP SERPL CALC-SCNC: 1 MMOL/L — LOW (ref 5–17)
APTT BLD: 28 SEC — SIGNIFICANT CHANGE UP (ref 24.5–35.6)
AST SERPL-CCNC: 43 U/L — HIGH (ref 15–37)
BASOPHILS # BLD AUTO: 0.04 K/UL — SIGNIFICANT CHANGE UP (ref 0–0.2)
BASOPHILS NFR BLD AUTO: 0.6 % — SIGNIFICANT CHANGE UP (ref 0–2)
BILIRUB SERPL-MCNC: 0.5 MG/DL — SIGNIFICANT CHANGE UP (ref 0.2–1.2)
BLD GP AB SCN SERPL QL: SIGNIFICANT CHANGE UP
BUN SERPL-MCNC: 31 MG/DL — HIGH (ref 7–23)
CALCIUM SERPL-MCNC: 9.5 MG/DL — SIGNIFICANT CHANGE UP (ref 8.5–10.1)
CHLORIDE SERPL-SCNC: 111 MMOL/L — HIGH (ref 96–108)
CO2 SERPL-SCNC: 31 MMOL/L — SIGNIFICANT CHANGE UP (ref 22–31)
CREAT SERPL-MCNC: 1.17 MG/DL — SIGNIFICANT CHANGE UP (ref 0.5–1.3)
EGFR: 63 ML/MIN/1.73M2 — SIGNIFICANT CHANGE UP
EOSINOPHIL # BLD AUTO: 0.08 K/UL — SIGNIFICANT CHANGE UP (ref 0–0.5)
EOSINOPHIL NFR BLD AUTO: 1.2 % — SIGNIFICANT CHANGE UP (ref 0–6)
GLUCOSE SERPL-MCNC: 102 MG/DL — HIGH (ref 70–99)
HCT VFR BLD CALC: 51.3 % — HIGH (ref 39–50)
HGB BLD-MCNC: 17 G/DL — SIGNIFICANT CHANGE UP (ref 13–17)
IMM GRANULOCYTES NFR BLD AUTO: 0.3 % — SIGNIFICANT CHANGE UP (ref 0–0.9)
INR BLD: 0.92 RATIO — SIGNIFICANT CHANGE UP (ref 0.85–1.18)
LYMPHOCYTES # BLD AUTO: 1.56 K/UL — SIGNIFICANT CHANGE UP (ref 1–3.3)
LYMPHOCYTES # BLD AUTO: 23.8 % — SIGNIFICANT CHANGE UP (ref 13–44)
MCHC RBC-ENTMCNC: 29.9 PG — SIGNIFICANT CHANGE UP (ref 27–34)
MCHC RBC-ENTMCNC: 33.1 GM/DL — SIGNIFICANT CHANGE UP (ref 32–36)
MCV RBC AUTO: 90.3 FL — SIGNIFICANT CHANGE UP (ref 80–100)
MONOCYTES # BLD AUTO: 1 K/UL — HIGH (ref 0–0.9)
MONOCYTES NFR BLD AUTO: 15.3 % — HIGH (ref 2–14)
NEUTROPHILS # BLD AUTO: 3.85 K/UL — SIGNIFICANT CHANGE UP (ref 1.8–7.4)
NEUTROPHILS NFR BLD AUTO: 58.8 % — SIGNIFICANT CHANGE UP (ref 43–77)
PLATELET # BLD AUTO: 146 K/UL — LOW (ref 150–400)
POTASSIUM SERPL-MCNC: 5.2 MMOL/L — SIGNIFICANT CHANGE UP (ref 3.5–5.3)
POTASSIUM SERPL-SCNC: 5.2 MMOL/L — SIGNIFICANT CHANGE UP (ref 3.5–5.3)
PROT SERPL-MCNC: 7.8 GM/DL — SIGNIFICANT CHANGE UP (ref 6–8.3)
PROTHROM AB SERPL-ACNC: 10.4 SEC — SIGNIFICANT CHANGE UP (ref 9.5–13)
RBC # BLD: 5.68 M/UL — SIGNIFICANT CHANGE UP (ref 4.2–5.8)
RBC # FLD: 14.6 % — HIGH (ref 10.3–14.5)
SODIUM SERPL-SCNC: 143 MMOL/L — SIGNIFICANT CHANGE UP (ref 135–145)
WBC # BLD: 6.55 K/UL — SIGNIFICANT CHANGE UP (ref 3.8–10.5)
WBC # FLD AUTO: 6.55 K/UL — SIGNIFICANT CHANGE UP (ref 3.8–10.5)

## 2024-06-12 PROCEDURE — 99221 1ST HOSP IP/OBS SF/LOW 40: CPT

## 2024-06-12 PROCEDURE — 70450 CT HEAD/BRAIN W/O DYE: CPT | Mod: MC

## 2024-06-12 PROCEDURE — 71260 CT THORAX DX C+: CPT | Mod: 26

## 2024-06-12 PROCEDURE — 72125 CT NECK SPINE W/O DYE: CPT | Mod: 26,MC

## 2024-06-12 PROCEDURE — 74177 CT ABD & PELVIS W/CONTRAST: CPT | Mod: MC

## 2024-06-12 PROCEDURE — 74177 CT ABD & PELVIS W/CONTRAST: CPT | Mod: 26

## 2024-06-12 PROCEDURE — 97162 PT EVAL MOD COMPLEX 30 MIN: CPT | Mod: GP

## 2024-06-12 PROCEDURE — 93010 ELECTROCARDIOGRAM REPORT: CPT

## 2024-06-12 PROCEDURE — 70450 CT HEAD/BRAIN W/O DYE: CPT | Mod: 26,76

## 2024-06-12 PROCEDURE — 97530 THERAPEUTIC ACTIVITIES: CPT | Mod: GP

## 2024-06-12 PROCEDURE — 99231 SBSQ HOSP IP/OBS SF/LOW 25: CPT

## 2024-06-12 PROCEDURE — 97116 GAIT TRAINING THERAPY: CPT | Mod: GP

## 2024-06-12 PROCEDURE — 71260 CT THORAX DX C+: CPT | Mod: MC

## 2024-06-12 RX ORDER — SODIUM CHLORIDE 9 MG/ML
1000 INJECTION INTRAMUSCULAR; INTRAVENOUS; SUBCUTANEOUS
Refills: 0 | Status: DISCONTINUED | OUTPATIENT
Start: 2024-06-12 | End: 2024-06-12

## 2024-06-12 RX ORDER — APIXABAN 2.5 MG/1
1 TABLET, FILM COATED ORAL
Refills: 0 | DISCHARGE

## 2024-06-12 RX ORDER — LIDOCAINE 4 G/100G
1 CREAM TOPICAL EVERY 24 HOURS
Refills: 0 | Status: DISCONTINUED | OUTPATIENT
Start: 2024-06-12 | End: 2024-06-12

## 2024-06-12 RX ORDER — ROSUVASTATIN CALCIUM 5 MG/1
1 TABLET ORAL
Refills: 0 | DISCHARGE

## 2024-06-12 RX ORDER — POTASSIUM CHLORIDE 20 MEQ
1 PACKET (EA) ORAL
Refills: 0 | DISCHARGE

## 2024-06-12 RX ORDER — ETANERCEPT 25 MG
50 VIAL (EA) SUBCUTANEOUS
Refills: 0 | DISCHARGE

## 2024-06-12 RX ORDER — PANTOPRAZOLE SODIUM 20 MG/1
40 TABLET, DELAYED RELEASE ORAL DAILY
Refills: 0 | Status: DISCONTINUED | OUTPATIENT
Start: 2024-06-12 | End: 2024-06-12

## 2024-06-12 RX ORDER — HYDROMORPHONE HYDROCHLORIDE 2 MG/ML
0.5 INJECTION INTRAMUSCULAR; INTRAVENOUS; SUBCUTANEOUS
Refills: 0 | Status: DISCONTINUED | OUTPATIENT
Start: 2024-06-12 | End: 2024-06-12

## 2024-06-12 RX ORDER — MORPHINE SULFATE 50 MG/1
2 CAPSULE, EXTENDED RELEASE ORAL EVERY 6 HOURS
Refills: 0 | Status: DISCONTINUED | OUTPATIENT
Start: 2024-06-12 | End: 2024-06-12

## 2024-06-12 RX ORDER — METOPROLOL TARTRATE 50 MG
2 TABLET ORAL
Refills: 0 | DISCHARGE

## 2024-06-12 RX ORDER — FUROSEMIDE 40 MG
1 TABLET ORAL
Refills: 0 | DISCHARGE

## 2024-06-12 RX ORDER — METOPROLOL TARTRATE 50 MG
5 TABLET ORAL EVERY 6 HOURS
Refills: 0 | Status: DISCONTINUED | OUTPATIENT
Start: 2024-06-12 | End: 2024-06-12

## 2024-06-12 RX ORDER — ONDANSETRON 8 MG/1
4 TABLET, FILM COATED ORAL EVERY 6 HOURS
Refills: 0 | Status: DISCONTINUED | OUTPATIENT
Start: 2024-06-12 | End: 2024-06-12

## 2024-06-12 RX ORDER — ASPIRIN/CALCIUM CARB/MAGNESIUM 324 MG
1 TABLET ORAL
Refills: 0 | DISCHARGE

## 2024-06-12 RX ORDER — CHOLECALCIFEROL (VITAMIN D3) 125 MCG
2 CAPSULE ORAL
Refills: 0 | DISCHARGE

## 2024-06-12 RX ORDER — ACETAMINOPHEN 500 MG
650 TABLET ORAL EVERY 6 HOURS
Refills: 0 | Status: DISCONTINUED | OUTPATIENT
Start: 2024-06-12 | End: 2024-06-12

## 2024-06-12 RX ORDER — HYDROXYCHLOROQUINE SULFATE 200 MG
1 TABLET ORAL
Refills: 0 | DISCHARGE

## 2024-06-12 RX ORDER — CALCIUM CARBONATE 500(1250)
1 TABLET ORAL
Refills: 0 | DISCHARGE

## 2024-06-12 RX ADMIN — TETANUS TOXOID, REDUCED DIPHTHERIA TOXOID AND ACELLULAR PERTUSSIS VACCINE, ADSORBED 0.5 MILLILITER(S): 5; 2.5; 8; 8; 2.5 SUSPENSION INTRAMUSCULAR at 00:08

## 2024-06-12 RX ADMIN — SODIUM CHLORIDE 75 MILLILITER(S): 9 INJECTION INTRAMUSCULAR; INTRAVENOUS; SUBCUTANEOUS at 03:27

## 2024-06-12 RX ADMIN — SODIUM CHLORIDE 75 MILLILITER(S): 9 INJECTION INTRAMUSCULAR; INTRAVENOUS; SUBCUTANEOUS at 07:07

## 2024-06-12 NOTE — H&P ADULT - NSHPLABSRESULTS_GEN_ALL_CORE
06-12 @ 01:45                    17.0  CBC: 6.55>)-------(<146                     51.3                 143 | 111 | 31    CMP:  ----------------------< 102               5.2 | 31 | 1.17                      Ca:9.5  Phos:-  Mg:-               0.5|      |43        LFTs:  ------|75|-----             -|      |-      Current Inpatient Medications:  acetaminophen     Tablet .. 650 milliGRAM(s) Oral every 6 hours PRN  HYDROmorphone  Injectable 0.5 milliGRAM(s) IV Push every 3 hours PRN  lidocaine   4% Patch 1 Patch Transdermal every 24 hours  metoprolol tartrate Injectable 5 milliGRAM(s) IV Push every 6 hours PRN  morphine  - Injectable 2 milliGRAM(s) IV Push every 6 hours PRN  ondansetron Injectable 4 milliGRAM(s) IV Push every 6 hours PRN  pantoprazole  Injectable 40 milliGRAM(s) IV Push daily    < from: CT Head No Cont (06.12.24 @ 00:13) >    IMPRESSION:  1. Small medial left frontal subdural hematoma extending along the   anterior left falx.  2. Multilevel degenerative changes of the cervical spine without evidence     < end of copied text >

## 2024-06-12 NOTE — PHYSICAL THERAPY INITIAL EVALUATION ADULT - GENERAL OBSERVATIONS, REHAB EVAL
Pt found supine in bed in SICU, +tele monitoring, Daughter at bedside, agreeable to participate in PT Evaluation. Pt is able to perform bed mobility and sit<>stand transfers with SBA . Pt is able to ambulate 300 feet with SBA without assistive device. Pt returned to bedside chair with chair alarm on, call bell and phone in reach, COLLIN Baca made aware.

## 2024-06-12 NOTE — DISCHARGE NOTE PROVIDER - PROVIDER TOKENS
PROVIDER:[TOKEN:[[id number]],FOLLOWUP:[2 weeks]] PROVIDER:[TOKEN:[[id number]],FOLLOWUP:[2 weeks]],PROVIDER:[TOKEN:[[id number]]]

## 2024-06-12 NOTE — DISCHARGE NOTE NURSING/CASE MANAGEMENT/SOCIAL WORK - NSDCFUADDAPPT_GEN_ALL_CORE_FT
PLEASE call neurosurgery office to schedule follow up appointment.  -HOLD Eliquis and ASPIRIN until 6/14/2024 per Neuro-surgery    Please call primary care provider to schedule follow up appointment.  Incidental CT findings:  REPRODUCTIVE ORGANS: Heterogeneously prominent prostate measuring 6.8 cm   in transverse diameter, cause mass effect and protrusion of the bladder   base.

## 2024-06-12 NOTE — DISCHARGE NOTE PROVIDER - NSDCCPCAREPLAN_GEN_ALL_CORE_FT
PRINCIPAL DISCHARGE DIAGNOSIS  Diagnosis: Subdural hemorrhage  Assessment and Plan of Treatment:

## 2024-06-12 NOTE — DISCHARGE NOTE PROVIDER - CARE PROVIDER_API CALL
Too Guzman  Neurosurgery  [address] [place] [id number]  Phone: [phone]  Fax: [phone]  Follow Up Time: 2 weeks   Too Guzman  Neurosurgery  [address] [place] [address]  Phone: [phone]  Fax: [phone]  Follow Up Time: 2 weeks    Bladimir Blount  Thoracic and Cardiac Surgery  [address]  [place] [address]  Phone: [phone]  Fax: [phone]  Follow Up Time:

## 2024-06-12 NOTE — ED PROVIDER NOTE - CLINICAL SUMMARY MEDICAL DECISION MAKING FREE TEXT BOX
Patient with fall on Eliquis and will CT head rule out bleed.    Progress note 2:40 AM patient found to have subdural, trauma and neurosurgery consulted, admitted to stepdown for neurochecks and repeat CTs.

## 2024-06-12 NOTE — DISCHARGE NOTE PROVIDER - CARE PROVIDERS DIRECT ADDRESSES
[email or web address]

## 2024-06-12 NOTE — DISCHARGE NOTE PROVIDER - NSDCFUADDINST_GEN_ALL_CORE_FT
POST CONCUSSIVE DISCHARGE INSTRUCTIONS:    Tylenol for Headaches, avoid narcotics. Call 911 or the Office at [phone] if headaches worsen and dont respond to over the counter medications.    ** Pt should return to ED if any worsening HA's, weakness, sleepiness, Loss of mental clarity / sharpness, sluggishness, Nausea or vomitting, etc.. read below    Post Concussive Care Guidelines:  Postconcussion syndrome is a condition that can happen after a mild brain injury called a "concussion." A concussion usually happens after hitting the head. But in some cases, it can happen after an injury or accident that causes violent shaking of the head.  When a person has postconcussion syndrome, they continue to have symptoms for days to weeks after their injury. Doctors also use the term "post-TBI syndrome." TBI stands for "traumatic brain injury."    The symptoms can include:  -Headache  -Dizziness or problems with balance  -Feeling very tired  -Feeling irritable, anxious, or depressed  -Memory problems or problems paying attention  -Problems with sleep  -Being easily bothered by noise or light    Some of these symptoms can make it hard to do your normal activities. This can be stressful or scary. Remember that they are real problems caused by your concussion, and they will get better with time.   But depending on your symptoms, you might have tests to make sure that you do not have a different problem. For example, some people have an imaging test called an CTH or MRI that creates pictures of the brain.    Treatment depends on which symptoms you have. It can be reassuring to know that postconcussion syndrome is a real medical problem. Knowing that there is a reason for your symptoms, and that they will improve over time, can help with anxiety.    -Medicines – Your doctor might recommend medicines to help with headaches, dizziness, sleep problems, or other symptoms.  -Rest – After a concussion, avoid activities that could cause another head injury. Rest your body and your brain for a few days, and avoid things that make your symptoms worse.    No strenuous activity until your follow up with your doctor.  He will tell you when you can play sports or do other activities again. It depends on your injury and symptoms, as well as the type of sport you play. Depending on your symptoms, you might not be able to go back to work full time right away.    -Counseling – If you are struggling with depression or anxiety, your doctor or nurse might suggest counseling. They might also prescribe medicines that can help.  -Avoiding substances – Avoid alcohol and cannabis while you are still having concussion symptoms.    When will I start to feel better?  Your symptoms will probably start to go away after about a week. Most people start to feel better in a week or 2, and are back to normal in 3 months. But a few people have symptoms that last longer. In these cases, your doctor might suggest medicines or other treatments.    Your or Someone should call for an doctor or ambulance (in the [place] and [place], call [id number]) if you:    -Cannot be fully woken up  -Are acting confused or disoriented  -Have a sudden and persistent change in your behavior  -Cannot walk normally  -Have trouble speaking or slurred speech  -Have severe weakness or cannot move an arm, leg, or 1 side of your face  -Have a seizure or jerking of your arms or legs that you cannot control  -Have concussion symptoms that are not improving or are getting worse, even with physical and mental rest  -Have blood or clear liquid draining from your ears or nose  -Seem weak or have numbness in an arm, leg, or other body part  -Have a stiff neck  -Have a headache that is severe, gets worse, feels different, or does not get better with over-the-counter medicines

## 2024-06-12 NOTE — DISCHARGE NOTE NURSING/CASE MANAGEMENT/SOCIAL WORK - NSDCPEFALRISK_GEN_ALL_CORE
For information on Fall & Injury Prevention, visit: [email or web address] OR  [email or web address] OR  https://www.cdc.gov/steadi/patient.html

## 2024-06-12 NOTE — CONSULT NOTE ADULT - ASSESSMENT
ASSESSMENT ASSESSMENT   80yo Male with PMHx afib on eliquis (last taken 9:30am), thoracic aneurysm and AAA (FU last week Dr Thomas) and rheumatoid arthritis presents s/p mechanical fall backwards. +LOC, +headstrike.  Patient states he lost balance and fell backwards, does not recall event to follow or how long he was "down".    CTH demonstrated small acute SDH in left frontal falx.    Admitted for   1. Mechanical Fall  2. Acute small L frontal SDH    PLAN    Neuro: AAOx 4. CTH results above. Repeat CTH stable.   CV: Normotensive. in Afib 60ss. resume toprol. eliquis held, cont to hold x 48hrs restart 6/14 as per neuro sx.  Pulm: on room air.  GI: PO diet. PPI. bowel regimen prn  Endo: cont hydroxychloroquine and prednisone for RA  Vasc: thoracic aortic aneurysm and AAA stable compared to prior CT scan. recommend f/u with outpatient vasc Dr. Thomas   ID: no abx indicated at this time.  trend WBC. monitor temps.   Heme: Hgb stable. no chemical DVT ppx d/t SDH. SCDs   PT eval    Dispo: Dispo planning. repeat CTH stable. holding eliquis. PT.     Will discuss with Dr. Schroeder

## 2024-06-12 NOTE — PHYSICAL THERAPY INITIAL EVALUATION ADULT - GAIT TRAINING, PT EVAL
Patient will be able to safely ambulate 300 feet with least restrictive assistive device by discharge from acute care setting.

## 2024-06-12 NOTE — H&P ADULT - NSICDXPASTMEDICALHX_GEN_ALL_CORE_FT
PAST MEDICAL HISTORY:  Aneurysm, aorta, thoracic     Chronic atrial fibrillation     Rheumatoid arthritis     Skin cancer

## 2024-06-12 NOTE — DISCHARGE NOTE PROVIDER - NSDCFUADDAPPT_GEN_ALL_CORE_FT
PLEASE call neurosurgery office to schedule follow up appointment.  -HOLD Eliquis and ASPIRIN until 6/14/2024 per Neuro-surgery PLEASE call neurosurgery office to schedule follow up appointment.  -HOLD Eliquis and ASPIRIN until 6/14/2024 per Neuro-surgery    Please call primary care provider to schedule follow up appointment.  Incidental CT findings:  REPRODUCTIVE ORGANS: Heterogeneously prominent prostate measuring 6.8 cm   in transverse diameter, cause mass effect and protrusion of the bladder   base.

## 2024-06-12 NOTE — DISCHARGE NOTE NURSING/CASE MANAGEMENT/SOCIAL WORK - NSDCVIVACCINE_GEN_ALL_CORE_FT
Tdap; 12-Jun-2024 00:08; aGbrielle Roque (COLLIN); [employer]; [id number] (Exp. Date: 30-Nov-2025); IntraMuscular; Deltoid Left.; 0.5 milliLiter(s); VIS (VIS Published: 09-May-2013, VIS Presented: 12-Jun-2024);

## 2024-06-12 NOTE — H&P ADULT - HISTORY OF PRESENT ILLNESS
Triage time - 23:38pm  PA call for consult - 1:27am  PA evaluation time - 1:50am    79 yr old Male significant pmhx of afib on eliquis BID (last taken 9:30am), thoracic aneurysm and rheumatoid arthritis presents s/p mechanical fall backwards.  +LOC, +headstrike.  Patient states he lost balance and fell backwards, does not recall event to follow or how long he was "down".  Urinated himself, multiple abrasions to right arm. He states he had a short period of retrograde amnesia that resolved.   CTH demonstrated small acute SDH in left frontal falx.  Denies any weakness, headaches, numbness, tingling.  Does say he had a short interval of amnesia post event. Triage time - 23:38pm  PA call for consult - 1:27am  PA evaluation time - 1:50am    79 yr old Male significant pmhx of afib on eliquis BID (last taken 9:30am), thoracic aneurysm and AAA (FU last week Dr Thomas) and rheumatoid arthritis presents s/p mechanical fall backwards.  +LOC, +headstrike.  Patient states he lost balance and fell backwards, does not recall event to follow or how long he was "down".  Urinated himself, multiple abrasions to right arm. He states he had a short period of retrograde amnesia that resolved.   CTH demonstrated small acute SDH in left frontal falx.  Denies any weakness, headaches, numbness, tingling.  Does say he had a short interval of amnesia post event.

## 2024-06-12 NOTE — ED ADULT NURSE NOTE - OBJECTIVE STATEMENT
patient ambulatory to ER s/p fall. +LOC. on eliquis. does not remember how long he was down for. bump to back of head, abrasions to right forearm, bleeding controlled. pt reports his balance was off and he couldn't catch himself prior to falling and fell backwards and hit his head. pt daughter reports she suspects pt lost control of bladder after fall b/c he woke up covered in his urine but pt cannot recall. pt reports he was confused after the fall but symptoms have since resolved. pt denies CP/SOB, N/V/D, fever/chills, dizziness, pain. no other complaints at this time. safety and comfort measures maintained. daughter at bedside

## 2024-06-12 NOTE — DISCHARGE NOTE NURSING/CASE MANAGEMENT/SOCIAL WORK - PATIENT PORTAL LINK FT
You can access the FollowMyHealth Patient Portal offered by [facility] by registering at the following website: [email or web address]. By joining [employer]’s FollowMyHealth portal, you will also be able to view your health information using other applications (apps) compatible with our system.

## 2024-06-12 NOTE — DISCHARGE NOTE PROVIDER - NSDCFUSCHEDAPPT_GEN_ALL_CORE_FT
[facility]  [address]  Scheduled Appointment: 07/09/2024    Clarke Reyes  [facility]  [address]UL[address]  Scheduled Appointment: 07/09/2024    Theodore Judd  [facility]  CARDIOLOGY [address]  Scheduled Appointment: 08/22/2024

## 2024-06-12 NOTE — ED PROVIDER NOTE - PHYSICAL EXAMINATION
GEN - NAD; well appearing; A+O x3   HEAD - NC/AT     EYES - EOMI, no conjunctival pallor, no scleral icterus  ENT -   mucous membranes  moist , no discharge      NECK - Neck supple  PULM - CTA b/l,  symmetric breath sounds  COR -  RRR, S1 S2, no murmurs  ABD - , ND, NT, soft, no guarding, no rebound, no masses    BACK - no CVA tenderness, nontender spine     EXTREMS - no edema, no deformity, warm and well perfused    SKIN - abrasions to right forearm   NEUROLOGIC - alert, sensation nl, motor 5/5 RUE/LUE/RLE/LLE

## 2024-06-12 NOTE — DISCHARGE NOTE PROVIDER - NSDCMRMEDTOKEN_GEN_ALL_CORE_FT
Caltrate 600 mg oral tablet: 1 tab(s) orally once a day  cholecalciferol 25 mcg (1000 intl units) oral tablet: 2 tab(s) orally once a day  Enbrel SureClick 50 mg/mL subcutaneous solution: 50 milligram(s) subcutaneously once a week  furosemide 20 mg oral tablet: 1 tab(s) orally once a day  hydroxychloroquine 200 mg oral tablet: 1 tab(s) orally 2 times a day  metoprolol succinate 25 mg oral tablet, extended release: 2 tab(s) orally once a day  Potassium Chloride (Eqv-Klor-Con M10) 10 mEq oral tablet, extended release: 1 tab(s) orally once a day  predniSONE 1 mg oral tablet: 2 tab(s) orally once a day  rosuvastatin 20 mg oral tablet: 1 tab(s) orally once a day (at bedtime)

## 2024-06-12 NOTE — H&P ADULT - ASSESSMENT
78 yo male s/p fall, positive head strike with short period of amnesia and CTH positive for left SDH.    Plan:    Admit to Trauma  Pain control  IVF  Home meds: on hold eliquis, resume metoprolol 50 mg ER after CTH is normal  DVT pxx, only mechaniacl  PT assessment  Diet: NPO  repeat CTH at 4 am   CT chest, abdomen and pelvis with IV contrast if Cr is WNL\  SICU level of care  Neurocheck q2h  Neurosurgery reccs appreciated    Case discussed with Dr. Pineda

## 2024-06-12 NOTE — CONSULT NOTE ADULT - SUBJECTIVE AND OBJECTIVE BOX
Triage time - 23:38  PA call for consult - 1:20  PA evaluation time - 1:25    HPI: 79 yr old Male significant pmhx of afib on eliquis BID (last taken 9:30am) presents s/p mechanical fall backwards.  +LOC, +headstrike.  Patient states he lost balance and fell backwards, does not recall event to follow or how long he was "down".  Urinated himself, multiple abrasions to right arm.   CTH demonstrated small acute SDH in left frontal falx.  Denies any weakness, headaches, numbness, tingling.  Does say he had a short interval of amnesia post event.       PAST MEDICAL & SURGICAL HISTORY:    FAMILY HITORY:     Noncontributory     Social Hx:  Nonsmoker, no drug or alcohol use    Allergies    No Known Allergies    Intolerances      MEDICATIONS  (STANDING):     ROS: Pertinent positives in HPI, all other ROS were reviewed and are negative.      Vital Signs Last 24 Hrs  T(C): 36.4 (11 Jun 2024 23:42), Max: 36.4 (11 Jun 2024 23:42)  T(F): 97.5 (11 Jun 2024 23:42), Max: 97.5 (11 Jun 2024 23:42)  HR: 69 (11 Jun 2024 23:42) (69 - 69)  BP: 136/81 (11 Jun 2024 23:42) (136/81 - 136/81)  BP(mean): 95 (11 Jun 2024 23:42) (95 - 95)  RR: 18 (11 Jun 2024 23:42) (18 - 18)  SpO2: 98% (11 Jun 2024 23:42) (98% - 98%)    Parameters below as of 11 Jun 2024 23:42  Patient On (Oxygen Delivery Method): room air        Physical Exam:  Constitutional: Awake / alert  HEENT: PERRLA, EOMI  Neck: Supple  Respiratory: Breath sounds are clear bilaterally  Cardiovascular: S1 and S2, regular rhythm  Gastrointestinal: Soft, NT/ND  Extremities:  no edema  Musculoskeletal: no abnormal movements  Skin: No rashes    Neurological Exam:  HF: A x O x 3, follows commands, normal affect, speech fluent  CN: PERRL, EOMI, no NLFD, tongue midline  Motor: Strength 5/5 in all 4 ext, normal bulk and tone  Sens: Intact to light touch  Reflexes: Symmetric and normal, no clonus, no Marsh's   Coord:  No FNFA, dysmetria, JANNIE intact   Gait/Balance: Cannot test    Labs:                  Radiology:    < from: CT Head No Cont (06.12.24 @ 00:13) >  IMPRESSION:  1. Small medial left frontal subdural hematoma extending along the   anterior left falx.  2. Multilevel degenerative changes of the cervical spine without evidence   of a fracture.    < end of copied text >

## 2024-06-12 NOTE — ED ADULT NURSE NOTE - NSFALLHARMRISKINTERV_ED_ALL_ED
Assistance OOB with selected safe patient handling equipment if applicable/Assistance with ambulation/Communicate risk of Fall with Harm to all staff, patient, and family/Monitor gait and stability/Provide visual cue: red socks, yellow wristband, yellow gown, etc/Reinforce activity limits and safety measures with patient and family/Bed in lowest position, wheels locked, appropriate side rails in place/Call bell, personal items and telephone in reach/Instruct patient to call for assistance before getting out of bed/chair/stretcher/Non-slip footwear applied when patient is off stretcher/[place] to call system/Physically safe environment - no spills, clutter or unnecessary equipment/Purposeful Proactive Rounding/Room/bathroom lighting operational, light cord in reach
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
## 2024-06-12 NOTE — H&P ADULT - NSHPPHYSICALEXAM_GEN_ALL_CORE
Primary Survey:    A - airway intact  B - bilateral breath sounds and good chest rise  C - initial BP  BP: 161/93 (10-11-22 @ 22:48)  , HR HR: 109 (10-11-22 @ 22:48)  , palpable pulses in all extremities  D - GCS 15 on arrival  Exposure obtained      Secondary Survey:   General: NAD  HEENT: Normocephalic, EOMI, PEERLA.  Neck: Soft, midline trachea.  Chest: No chest wall tenderness.   Cardiac: S1, S2, RRR  Respiratory: Bilateral breath sounds, clear and equal bilaterally  Abdomen: Soft, non-distended, non-tender, no rebound, no guarding, no masses palpated  Groin: Normal appearing  Ext: palp radial b/l UE, b/l  femoral pulses, cold feet, non palpable DP/PT on the LLE, palpable DP in the RLQ, motor and sensory grossly intact in all 4 extremities  Back: no TTP, no palpable runoff/stepoff/deformity Primary Survey:    A - airway intact  B - bilateral breath sounds and good chest rise  C - initial BP  BP: 161/93 (10-11-22 @ 22:48)  , HR HR: 109 (10-11-22 @ 22:48)  , palpable pulses in all extremities  D - GCS 15 on arrival  Exposure obtained      Secondary Survey:   General: NAD  HEENT: Normocephalic, EOMI, PEERLA.  Neck: Soft, midline trachea.  Chest: No chest wall tenderness.   Cardiac: S1, S2, RRR  Respiratory: Bilateral breath sounds, clear and equal bilaterally  Abdomen: Soft, non-distended, non-tender, no rebound, no guarding, no masses palpated  Groin: Normal appearing  Ext: palp radial b/l UE, b/l  femoral pulses, cold feet, non palpable DP/PT on the LLE, palpable DP in the RLQ, motor and sensory grossly intact in all 4 extremities  Back: no TTP, no palpable runoff/stepoff/deformity  Skin: ecchymosis in b/l arms, LUE small abrasion over the elbow, chronic small scars from previous skin cancer removal in the face and head, multiple seborrheic keratosis

## 2024-06-12 NOTE — CONSULT NOTE ADULT - NS ATTEND OPT1 GEN_ALL_CORE
I attest my time as attending is greater than 50% of the total combined time spent on qualifying patient care activities by the PA/NP and attending.
BACK PAIN

## 2024-06-12 NOTE — PHYSICAL THERAPY INITIAL EVALUATION ADULT - ADDITIONAL COMMENTS
Patient called again requesting an addition to the physical therapy she is already in, she would like her rib area, and left shoulder added. Patient states at night her pain is at level 10, she goes to AT for therapy, call patient with questions.   1 flight of stairs (13 steps) within home with bilateral handrails   Has a cane, does not own a RW, but Daughter has one that she can give to the patient if need be  +

## 2024-06-12 NOTE — ED PROVIDER NOTE - OBJECTIVE STATEMENT
79 yr old Male significant pmhx of afib on eliquis BID (last taken 9:30am), thoracic aneurysm and rheumatoid arthritis presents s/p mechanical fall backwards.  +LOC, +headstrike.  Patient states he lost balance and fell backwards, does not recall event to follow or how long he was "down".  Urinated himself, multiple abrasions to right arm

## 2024-06-12 NOTE — CONSULT NOTE ADULT - NS ATTEND AMEND GEN_ALL_CORE FT
ASSESSMENT   78yo Male with PMHx afib on eliquis (last taken 9:30am), thoracic aneurysm and AAA (FU last week Dr Thomas) and rheumatoid arthritis presents s/p mechanical fall backwards. +LOC, +headstrike.  Patient states he lost balance and fell backwards, does not recall event to follow or how long he was "down".    CTH demonstrated small acute SDH in left frontal falx.    Admitted for   1. Mechanical Fall  2. Acute small L frontal SDH      Plan:  CTH Stable  Neuro Surg ok to resume AC in 48H  OOb  PT  PO Diet  Resume BBx

## 2024-06-12 NOTE — DISCHARGE NOTE NURSING/CASE MANAGEMENT/SOCIAL WORK - NURSING SECTION COMPLETE
Goal Outcome Evaluation:  Plan of Care Reviewed With: other (see comments)        Progress: no change  Outcome Evaluation: Ntn assessment completed. Pt NPO for surgery today. Pt identified with reduced oral intake on admission. Cont to follow for plan of care.   Patient/Caregiver provided printed discharge information.

## 2024-06-12 NOTE — PROGRESS NOTE ADULT - SUBJECTIVE AND OBJECTIVE BOX
CC:Patient is a 79y old  Male who presents with a chief complaint of trauma (12 Jun 2024 13:35)    Tertiary Exam    Overnight: No acute events     Subjective:  Pt seen and examined at bedside with chaperone. Pt is AAOx3, pt in no acute distress. Pt denied c/o fever, chills, chest pain, SOB, abd pain, N/V/D, extremity pain or dysfunction, hemoptysis, hematemesis, hematuria, hematochezia headache, diplopia, vertigo, dizziness.    ROS:  otherwise as abovementioned ROS    Vital Signs Last 24 Hrs  T(C): 36.5 (12 Jun 2024 12:57), Max: 36.9 (12 Jun 2024 09:02)  T(F): 97.7 (12 Jun 2024 12:57), Max: 98.4 (12 Jun 2024 09:02)  HR: 82 (12 Jun 2024 14:30) (62 - 82)  BP: 119/77 (12 Jun 2024 14:30) (113/75 - 143/83)  BP(mean): 90 (12 Jun 2024 14:30) (72 - 103)  RR: 26 (12 Jun 2024 14:30) (15 - 26)  SpO2: 97% (12 Jun 2024 14:30) (97% - 100%)    Parameters below as of 12 Jun 2024 08:15  Patient On (Oxygen Delivery Method): room air        Labs:                                17.0   6.55  )-----------( 146      ( 12 Jun 2024 01:45 )             51.3     CBC Full  -  ( 12 Jun 2024 01:45 )  WBC Count : 6.55 K/uL  RBC Count : 5.68 M/uL  Hemoglobin : 17.0 g/dL  Hematocrit : 51.3 %  Platelet Count - Automated : 146 K/uL  Mean Cell Volume : 90.3 fl  Mean Cell Hemoglobin : 29.9 pg  Mean Cell Hemoglobin Concentration : 33.1 gm/dL  Auto Neutrophil # : 3.85 K/uL  Auto Lymphocyte # : 1.56 K/uL  Auto Monocyte # : 1.00 K/uL  Auto Eosinophil # : 0.08 K/uL  Auto Basophil # : 0.04 K/uL  Auto Neutrophil % : 58.8 %  Auto Lymphocyte % : 23.8 %  Auto Monocyte % : 15.3 %  Auto Eosinophil % : 1.2 %  Auto Basophil % : 0.6 %    06-12    143  |  111<H>  |  31<H>  ----------------------------<  102<H>  5.2   |  31  |  1.17    Ca    9.5      12 Jun 2024 01:45    TPro  7.8  /  Alb  3.3  /  TBili  0.5  /  DBili  x   /  AST  43<H>  /  ALT  30  /  AlkPhos  75  06-12    LIVER FUNCTIONS - ( 12 Jun 2024 01:45 )  Alb: 3.3 g/dL / Pro: 7.8 gm/dL / ALK PHOS: 75 U/L / ALT: 30 U/L / AST: 43 U/L / GGT: x           PT/INR - ( 12 Jun 2024 01:45 )   PT: 10.4 sec;   INR: 0.92 ratio         PTT - ( 12 Jun 2024 01:45 )  PTT:28.0 sec      Meds:  acetaminophen     Tablet .. 650 milliGRAM(s) Oral every 6 hours PRN  HYDROmorphone  Injectable 0.5 milliGRAM(s) IV Push every 3 hours PRN  lidocaine   4% Patch 1 Patch Transdermal every 24 hours  metoprolol tartrate Injectable 5 milliGRAM(s) IV Push every 6 hours PRN  morphine  - Injectable 2 milliGRAM(s) IV Push every 6 hours PRN  ondansetron Injectable 4 milliGRAM(s) IV Push every 6 hours PRN  pantoprazole  Injectable 40 milliGRAM(s) IV Push daily  sodium chloride 0.9%. 1000 milliLiter(s) IV Continuous <Continuous>      Radiology:  < from: CT Head No Cont (06.12.24 @ 00:13) >  IMPRESSION:  1. Small medial left frontal subdural hematoma extending along the   anterior left falx.  2. Multilevel degenerative changes of the cervical spine without evidence   of a fracture.    Call Back:  I discussed this case with Dr. Vigil at 1:13 AM on 6/12/2024.    Hospital policies for call back including read back policy were   followed. The verbal communication call back supplements this written   report.    --- End of Report ---    < end of copied text >  < from: CT Head No Cont (06.12.24 @ 04:24) >  IMPRESSION:  Small medial left frontal subdural hematoma extending along the anterior   left falx, not significantly changed.    --- End of Report ---    < end of copied text >  < from: CT Chest w/ IV Cont (06.12.24 @ 04:28) >  IMPRESSION:    No sequela of acute traumatic injury within the chest, abdomen or pelvis.    Reidentified 5.3 cm thoracic aorta at the sinuses of Valsalva.    Redemonstrated infrarenal abdominal aortic aneurysm measuring 4 x 3.6 cm   with continued focal intimal calcification displacement, likely in   keeping with chronic dissection. No periaortic hematoma.    Additional findings as mentioned above.    These results were discussed via telephone at 6/12/2024 5:03 AM by Dr. Espinoza of radiology with Dr. Vigil.        --- End of Report ---    < end of copied text >  < from: CT Abdomen and Pelvis w/ IV Cont (06.12.24 @ 04:28) >  IMPRESSION:    No sequela of acute traumatic injury within the chest, abdomen or pelvis.    Reidentified 5.3 cm thoracic aorta at the sinuses of Valsalva.    Redemonstrated infrarenal abdominal aortic aneurysm measuring 4 x 3.6 cm   with continued focal intimal calcification displacement, likely in   keeping with chronic dissection. No periaortic hematoma.    Additional findings as mentioned above.    These results were discussed via telephone at 6/12/2024 5:03 AM by Dr. Espinoza of radiology with Dr. Vigil.        --- End of Report ---    < end of copied text >  < from: CT Cervical Spine No Cont (06.12.24 @ 00:13) >  IMPRESSION:  1. Small medial left frontal subdural hematoma extending along the   anterior left falx.  2. Multilevel degenerative changes of the cervical spine without evidence   of a fracture.    Call Back:  I discussed this case with Dr. Vigil at 1:13 AM on 6/12/2024.    Hospital policies for call back including read back policy were   followed. The verbal communication call back supplements this written   report.    --- End of Report ---    < end of copied text >        Physical exam:  GCS of 15  Pt is aaox3  Pt in no acute distress  Airway is patent  Breathing is symmetric and unlabored  Neuro: CN II-XII grossly intact  Psych: normal affect  HEENT: normocephalic, PELON, EOM wnl, no gross craniofacial bony pathology to exam  Neck: No tracheal deviation, no JVD, no crepitus, no ecchymosis, no hematoma  Chest: No gross rib or sternal pathology or tenderness to exam, no crepitus, no ecchymosis, no hematoma  Resp: CTAB  CVS: S1S2(+)  ABD: bowel sounds (+), soft, nontender, non distended, no rebound, no guarding, no rigidity, no pelvic instability to exam  EXT: no calf tenderness or edema to exam b/l, pt has good capillary refill in all digits. Sensoromotor function grossly intact, on VTE prophylaxis  Skin: no adverse skin changes to exam      
 This is a 79 yr old Male significant pmhx of afib on eliquis BID (last taken 9:30am 6/11/24) patient presented s/p mechanical fall backwards.  +LOC, +headstrike.  Patient states he lost balance and fell backwards, does not recall event to follow or how long he was "down".  Urinated himself, multiple abrasions to right arm.   CTH demonstrated small acute SDH in left frontal falx.  Denies any weakness, headaches, numbness, tingling.  Does say he had a short interval of amnesia post event. patient admitted overnight for close observation. No events overnight. Repeat CT of brain was stable    6/12 patient seen and examined. Patient denies any complaints this am. Denies headache, numbness or weakness to extremities or other complaints    ICU Vital Signs Last 24 Hrs  T(C): 36.9 (12 Jun 2024 09:02), Max: 36.9 (12 Jun 2024 09:02)  T(F): 98.4 (12 Jun 2024 09:02), Max: 98.4 (12 Jun 2024 09:02)  HR: 75 (12 Jun 2024 08:15) (63 - 75)  BP: 143/83 (12 Jun 2024 08:15) (113/75 - 143/83)  BP(mean): 103 (12 Jun 2024 08:15) (87 - 103)  ABP: --  ABP(mean): --  RR: 21 (12 Jun 2024 08:15) (15 - 21)  SpO2: 100% (12 Jun 2024 08:15) (97% - 100%)    O2 Parameters below as of 12 Jun 2024 08:15  Patient On (Oxygen Delivery Method): room air                         17.0   6.55  )-----------( 146      ( 12 Jun 2024 01:45 )             51.3   06-12    143  |  111<H>  |  31<H>  ----------------------------<  102<H>  5.2   |  31  |  1.17    Ca    9.5      12 Jun 2024 01:45    TPro  7.8  /  Alb  3.3  /  TBili  0.5  /  DBili  x   /  AST  43<H>  /  ALT  30  /  AlkPhos  75  06-12  PT/INR - ( 12 Jun 2024 01:45 )   PT: 10.4 sec;   INR: 0.92 ratio         PTT - ( 12 Jun 2024 01:45 )  PTT:28.0 sec  MEDICATIONS  (STANDING):  lidocaine   4% Patch 1 Patch Transdermal every 24 hours  pantoprazole  Injectable 40 milliGRAM(s) IV Push daily  sodium chloride 0.9%. 1000 milliLiter(s) (75 mL/Hr) IV Continuous <Continuous>    Physical Exam:  Constitutional: Awake / alert  HEENT: PERRLA, EOMI  Neck: Supple  Respiratory: Breath sounds are clear bilaterally  Cardiovascular: S1 and S2, regular rhythm  Gastrointestinal: Soft, NT/ND  Extremities:  no edema  Musculoskeletal: no abnormal movements  Skin: No rashes    Neurological Exam:  HF: A x O x 3, follows commands, normal affect, speech fluent  CN: PERRL, EOMI, no NLFD, tongue midline  Motor: Strength 5/5 in all 4 ext, normal bulk and tone  Sens: Intact to light touch  Reflexes: Symmetric and normal, no clonus, no Marsh's   Coord:  No FNFA, dysmetria, JANNIE intact   Gait/Balance: Cannot test

## 2024-06-12 NOTE — ED ADULT NURSE REASSESSMENT NOTE - NS ED NURSE REASSESS COMMENT FT1
Pt resting comfortably in bed. vital signs stable, no distress noted, no other complaints at this time. pt given ice chips, cleared by neurosurgeon PA.

## 2024-06-12 NOTE — DISCHARGE NOTE PROVIDER - HOSPITAL COURSE
80y/o M with PMHx of Afib (on Eliquis), thoracic aneurysm and AAA (FU last week Dr Blount) and rheumatoid arthritis presents s/p mechanical fall backwards.  +LOC, +head strike.  Patient states he lost balance and fell backwards, does not recall event to follow or how long he was "down".  Urinated himself, multiple abrasions to right arm. Pt states he had a short period of retrograde amnesia that resolved. CTH demonstrated small acute SDH in left frontal falx. Neurosurgery consulted, no acute surgical intervention. Repeat CTH with small medial left frontal subdural hematoma extending along the anterior left falx, not significantly changed. Cleared from neurosurgery for discharge with follow up (2 weeks) and restart Eliquis & Asprin date: 6/14/2024.    Incidental CT findings:  VESSELS: Aorta and coronary artery calcifications. Stable dilatation of   the sinus of Valsalva measuring 5.3 x 4.9 x 4.9 cm. Mid ascending   thoracic aorta measures 4.1 cm.  VESSELS:  Atherosclerotic changes. Reidentified infrarenal abdominal   aortic aneurysm measuring 4 x 3.6 cm with continued focal intimal   calcification displacement, likely in keeping with chronic dissection.      REPRODUCTIVE ORGANS: Heterogeneously prominent prostate measuring 6.8 cm   in transverse diameter, cause mass effect and protrusion of the bladder   base.                          17.0   6.55  )-----------( 146      ( 12 Jun 2024 01:45 )             51.3   06-12    143  |  111<H>  |  31<H>  ----------------------------<  102<H>  5.2   |  31  |  1.17    Ca    9.5      12 Jun 2024 01:45    TPro  7.8  /  Alb  3.3  /  TBili  0.5  /  DBili  x   /  AST  43<H>  /  ALT  30  /  AlkPhos  75  06-12

## 2024-06-12 NOTE — PHYSICAL THERAPY INITIAL EVALUATION ADULT - PERTINENT HX OF CURRENT PROBLEM, REHAB EVAL
This is a 79 yr old Male significant pmhx of afib on eliquis BID (last taken 9:30am 6/11/24) patient presented s/p mechanical fall backwards.  +LOC, +headstrike.  Patient states he lost balance and fell backwards, does not recall event to follow or how long he was "down".  Urinated himself, multiple abrasions to right arm.   CTH demonstrated small acute SDH in left frontal falx.  Denies any weakness, headaches, numbness, tingling.  Does say he had a short interval of amnesia post event. patient admitted overnight for close observation. No events overnight. Repeat CT of brain was stable.

## 2024-06-12 NOTE — CONSULT NOTE ADULT - SUBJECTIVE AND OBJECTIVE BOX
Patient is a 79y old  Male who presents with a chief complaint of traumatic fall with SDH (12 Jun 2024 09:04)      BRIEF HOSPITAL COURSE: ***    Events last 24 hours: ***    PAST MEDICAL & SURGICAL HISTORY:  Chronic atrial fibrillation      Rheumatoid arthritis      Aneurysm, aorta, thoracic      Skin cancer      S/P cataract surgery      S/P appendectomy            Medications:    metoprolol tartrate Injectable 5 milliGRAM(s) IV Push every 6 hours PRN      acetaminophen     Tablet .. 650 milliGRAM(s) Oral every 6 hours PRN  HYDROmorphone  Injectable 0.5 milliGRAM(s) IV Push every 3 hours PRN  morphine  - Injectable 2 milliGRAM(s) IV Push every 6 hours PRN  ondansetron Injectable 4 milliGRAM(s) IV Push every 6 hours PRN        pantoprazole  Injectable 40 milliGRAM(s) IV Push daily        sodium chloride 0.9%. 1000 milliLiter(s) IV Continuous <Continuous>      lidocaine   4% Patch 1 Patch Transdermal every 24 hours            ICU Vital Signs Last 24 Hrs  T(C): 36.9 (12 Jun 2024 09:02), Max: 36.9 (12 Jun 2024 09:02)  T(F): 98.4 (12 Jun 2024 09:02), Max: 98.4 (12 Jun 2024 09:02)  HR: 62 (12 Jun 2024 10:00) (62 - 75)  BP: 118/72 (12 Jun 2024 10:00) (113/75 - 143/83)  BP(mean): 87 (12 Jun 2024 10:00) (87 - 103)  ABP: --  ABP(mean): --  RR: 15 (12 Jun 2024 10:00) (15 - 21)  SpO2: 100% (12 Jun 2024 10:00) (97% - 100%)    O2 Parameters below as of 12 Jun 2024 08:15  Patient On (Oxygen Delivery Method): room air                I&O's Detail        LABS:                        17.0   6.55  )-----------( 146      ( 12 Jun 2024 01:45 )             51.3     06-12    143  |  111<H>  |  31<H>  ----------------------------<  102<H>  5.2   |  31  |  1.17    Ca    9.5      12 Jun 2024 01:45    TPro  7.8  /  Alb  3.3  /  TBili  0.5  /  DBili  x   /  AST  43<H>  /  ALT  30  /  AlkPhos  75  06-12          CAPILLARY BLOOD GLUCOSE        PT/INR - ( 12 Jun 2024 01:45 )   PT: 10.4 sec;   INR: 0.92 ratio         PTT - ( 12 Jun 2024 01:45 )  PTT:28.0 sec  Urinalysis Basic - ( 12 Jun 2024 01:45 )    Color: x / Appearance: x / SG: x / pH: x  Gluc: 102 mg/dL / Ketone: x  / Bili: x / Urobili: x   Blood: x / Protein: x / Nitrite: x   Leuk Esterase: x / RBC: x / WBC x   Sq Epi: x / Non Sq Epi: x / Bacteria: x      CULTURES:      Physical Examination:    General: No acute distress.    HEENT: Pupils equal, reactive to light.  Smmetric.  PULM: Clear t auscultation bilaterally, no wheezing  CVS: Regular rate and rhythm, no murmurs  ABD: Soft, nondistended, nontender  EXT: No LE edema,  SKIN: Warm and well perfused  NEURO: Alert, oriented, interactive, no gross focal deficits. ambulating well    DEVICES:     RADIOLOGY:   < from: CT Abdomen and Pelvis w/ IV Cont (06.12.24 @ 04:28) >    IMPRESSION:    No sequela of acute traumatic injury within the chest, abdomen or pelvis.    Reidentified 5.3 cm thoracic aorta at the sinuses of Valsalva.    Redemonstrated infrarenal abdominal aortic aneurysm measuring 4 x 3.6 cm   with continued focal intimal calcification displacement, likely in   keeping with chronic dissection. No periaortic hematoma.    Additional findings as mentioned above.    < end of copied text >        < from: CT Head No Cont (06.12.24 @ 04:24) >  IMPRESSION:  Small medial left frontal subdural hematoma extending along the anterior   left falx, not significantly changed.    < end of copied text >      < from: CT Head No Cont (06.12.24 @ 00:13) >  IMPRESSION:  1. Small medial left frontal subdural hematoma extending along the   anterior left falx.  2. Multilevel degenerative changes of the cervical spine without evidence   of a fracture.    < end of copied text >   Patient is a 79y old  Male who presents with a chief complaint of traumatic fall with SDH (12 Jun 2024 09:04)      BRIEF HOSPITAL COURSE: 80yo Male with PMHx afib on eliquis (last taken 9:30am), thoracic aneurysm and AAA (FU last week Dr Thomas) and rheumatoid arthritis presents s/p mechanical fall backwards. +LOC, +headstrike.  Patient states he lost balance and fell backwards, does not recall event to follow or how long he was "down".    CTH demonstrated small acute SDH in left frontal falx.     6/12 pt seen earlier today. denies HA, dizziness, chest pain sob.     PAST MEDICAL & SURGICAL HISTORY:  Chronic atrial fibrillation  Rheumatoid arthritis  Aneurysm, aorta, thoracic  Skin cancer  S/P cataract surgery  S/P appendectomy      Medications:    metoprolol tartrate Injectable 5 milliGRAM(s) IV Push every 6 hours PRN  acetaminophen     Tablet .. 650 milliGRAM(s) Oral every 6 hours PRN  HYDROmorphone  Injectable 0.5 milliGRAM(s) IV Push every 3 hours PRN  morphine  - Injectable 2 milliGRAM(s) IV Push every 6 hours PRN  ondansetron Injectable 4 milliGRAM(s) IV Push every 6 hours PRN  pantoprazole  Injectable 40 milliGRAM(s) IV Push daily  sodium chloride 0.9%. 1000 milliLiter(s) IV Continuous <Continuous>  lidocaine   4% Patch 1 Patch Transdermal every 24 hours      ICU Vital Signs Last 24 Hrs  T(C): 36.9 (12 Jun 2024 09:02), Max: 36.9 (12 Jun 2024 09:02)  T(F): 98.4 (12 Jun 2024 09:02), Max: 98.4 (12 Jun 2024 09:02)  HR: 62 (12 Jun 2024 10:00) (62 - 75)  BP: 118/72 (12 Jun 2024 10:00) (113/75 - 143/83)  BP(mean): 87 (12 Jun 2024 10:00) (87 - 103)  ABP: --  ABP(mean): --  RR: 15 (12 Jun 2024 10:00) (15 - 21)  SpO2: 100% (12 Jun 2024 10:00) (97% - 100%)    O2 Parameters below as of 12 Jun 2024 08:15  Patient On (Oxygen Delivery Method): room air      LABS:                        17.0   6.55  )-----------( 146      ( 12 Jun 2024 01:45 )             51.3     06-12    143  |  111<H>  |  31<H>  ----------------------------<  102<H>  5.2   |  31  |  1.17    Ca    9.5      12 Jun 2024 01:45    TPro  7.8  /  Alb  3.3  /  TBili  0.5  /  DBili  x   /  AST  43<H>  /  ALT  30  /  AlkPhos  75  06-12    CAPILLARY BLOOD GLUCOSE  PT/INR - ( 12 Jun 2024 01:45 )   PT: 10.4 sec;   INR: 0.92 ratio    PTT - ( 12 Jun 2024 01:45 )  PTT:28.0 sec  Urinalysis Basic - ( 12 Jun 2024 01:45 )    Color: x / Appearance: x / SG: x / pH: x  Gluc: 102 mg/dL / Ketone: x  / Bili: x / Urobili: x   Blood: x / Protein: x / Nitrite: x   Leuk Esterase: x / RBC: x / WBC x   Sq Epi: x / Non Sq Epi: x / Bacteria: x    CULTURES:    Physical Examination:    General: No acute distress.    HEENT: Pupils equal, reactive to light.  Smmetric.  PULM: Clear t auscultation bilaterally, no wheezing  CVS: Regular rate and rhythm, no murmurs  ABD: Soft, nondistended, nontender  EXT: No LE edema,  SKIN: Warm and well perfused  NEURO: Alert, oriented, interactive, no gross focal deficits. ambulating well    DEVICES:     RADIOLOGY:   < from: CT Abdomen and Pelvis w/ IV Cont (06.12.24 @ 04:28) >    IMPRESSION:    No sequela of acute traumatic injury within the chest, abdomen or pelvis.    Reidentified 5.3 cm thoracic aorta at the sinuses of Valsalva.    Redemonstrated infrarenal abdominal aortic aneurysm measuring 4 x 3.6 cm   with continued focal intimal calcification displacement, likely in   keeping with chronic dissection. No periaortic hematoma.    Additional findings as mentioned above.    < end of copied text >        < from: CT Head No Cont (06.12.24 @ 04:24) >  IMPRESSION:  Small medial left frontal subdural hematoma extending along the anterior   left falx, not significantly changed.    < end of copied text >      < from: CT Head No Cont (06.12.24 @ 00:13) >  IMPRESSION:  1. Small medial left frontal subdural hematoma extending along the   anterior left falx.  2. Multilevel degenerative changes of the cervical spine without evidence   of a fracture.    < end of copied text >

## 2024-06-12 NOTE — CONSULT NOTE ADULT - ASSESSMENT
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     - No acute neurosurgical intervention at this time.   - Repeat CTH 4 hours for stability.  - NPO until scan, small sips water OK.   - If CT scan read stable, may resume diet.   - No need for reversal of Eliquis, SDH small and last dose taken >12 hours.   - Admit trauma per trauma protocol  - Q2 neuro checks ok.   - PT eval in AM    will d/w Dr Guzman.

## 2024-06-12 NOTE — PROGRESS NOTE ADULT - ASSESSMENT
78y/o M with PMHx of Afib (on Eliquis), thoracic aneurysm and AAA (FU last week Dr Blount) and rheumatoid arthritis presents s/p mechanical fall backwards.  +LOC, +head strike.  Patient states he lost balance and fell backwards, does not recall event to follow or how long he was "down".  Urinated himself, multiple abrasions to right arm. Pt states he had a short period of retrograde amnesia that resolved. CTH demonstrated small acute SDH in left frontal falx. Neurosurgery consulted, no acute surgical intervention. Repeat CTH with small medial left frontal subdural hematoma extending along the anterior left falx, not significantly changed. Cleared from neurosurgery for discharge with follow up (2 weeks) and restart Eliquis & Asprin date: 6/14/2024.    #Traumatic fall  #SDH    Plan:  Admit to SICU for Q2 HR neuro checks  No acute traumatic surgical intervention indicated at this time.  Neurosurg consult appreciated  -Repeat CT stable  -Plan to HOLD Eliquis & ASA until 6/14/2024 per Neuro-surg  -F/U outpt in x2wks  Cont analgesic and antiemetic mgmt  Cont GI/DVT (VTE) ppx  Cont PO diet   Cont appropriate home medication mgmt    Incidental CT finding  -Heterogeneously prominent prostate measuring 6.8 cm   Outpt follow up with PCP    VESSELS: Aorta and coronary artery calcifications. Stable dilatation of   the sinus of Valsalva measuring 5.3 x 4.9 x 4.9 cm. Mid ascending   thoracic aorta measures 4.1 cm.  VESSELS:  Atherosclerotic changes. Reidentified infrarenal abdominal   aortic aneurysm measuring 4 x 3.6 cm with continued focal intimal   calcification displacement, likely in keeping with chronic dissection.   Known hx of thoracic and AAA. Follows Dr Blount.     Case and plan discussed with surgical attending.    
 This is a 79 yr old Male significant pmhx of afib on eliquis BID (last taken 9:30am 6/11/24) patient presented s/p mechanical fall backwards.  +LOC, +headstrike.  Patient states he lost balance and fell backwards, does not recall event to follow or how long he was "down".  Urinated himself, multiple abrasions to right arm.   CTH demonstrated small acute SDH in left frontal falx.  Denies any weakness, headaches, numbness, tingling.  Does say he had a short interval of amnesia post event. patient admitted overnight for close observation. No events overnight. Repeat CT of brain was stable    sp traumatic fall on Eliquis with SDH  repeat CTH stable, no acute neurosurgical intervention  patient may resume prior diet, dc IVF, keep off Eliquis x 48hr, can restart eliquis 6/14/24  follow up in off nancy Guzman in 2 weeks  patient can be discharged from neurosurgery standpoint after PT eval & clearance  plan [facility] patient & family at bedside  plan also [facility] SICU & trauma team

## 2024-06-14 ENCOUNTER — RX RENEWAL (OUTPATIENT)
Age: 79
End: 2024-06-14

## 2024-06-14 RX ORDER — FUROSEMIDE 20 MG/1
20 TABLET ORAL
Qty: 90 | Refills: 3 | Status: ACTIVE | COMMUNITY
Start: 2023-11-19 | End: 1900-01-01

## 2024-06-19 DIAGNOSIS — R32 UNSPECIFIED URINARY INCONTINENCE: ICD-10-CM

## 2024-06-19 DIAGNOSIS — Z85.828 PERSONAL HISTORY OF OTHER MALIGNANT NEOPLASM OF SKIN: ICD-10-CM
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
## 2024-06-19 DIAGNOSIS — I71.40 ABDOMINAL AORTIC ANEURYSM, WITHOUT RUPTURE, UNSPECIFIED: ICD-10-CM

## 2024-06-19 DIAGNOSIS — Z79.01 LONG TERM (CURRENT) USE OF ANTICOAGULANTS: ICD-10-CM

## 2024-06-19 DIAGNOSIS — S06.5X9A TRAUMATIC SUBDURAL HEMORRHAGE WITH LOSS OF CONSCIOUSNESS OF UNSPECIFIED DURATION, INITIAL ENCOUNTER: ICD-10-CM

## 2024-06-19 DIAGNOSIS — Z90.49 ACQUIRED ABSENCE OF OTHER SPECIFIED PARTS OF DIGESTIVE TRACT: ICD-10-CM
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
## 2024-06-19 DIAGNOSIS — I48.20 CHRONIC ATRIAL FIBRILLATION, UNSPECIFIED: ICD-10-CM

## 2024-06-19 DIAGNOSIS — I71.20 THORACIC AORTIC ANEURYSM, WITHOUT RUPTURE, UNSPECIFIED: ICD-10-CM

## 2024-06-19 DIAGNOSIS — S40.811A ABRASION OF RIGHT UPPER ARM, INITIAL ENCOUNTER: ICD-10-CM

## 2024-06-19 DIAGNOSIS — R40.2412 GLASGOW COMA SCALE SCORE 13-15, AT ARRIVAL TO EMERGENCY DEPARTMENT: ICD-10-CM

## 2024-06-19 DIAGNOSIS — M06.9 RHEUMATOID ARTHRITIS, UNSPECIFIED: ICD-10-CM

## 2024-07-09 ENCOUNTER — APPOINTMENT (OUTPATIENT)
Dept: PULMONOLOGY | Facility: CLINIC | Age: 79
End: 2024-07-09
Payer: MEDICARE

## 2024-07-09 ENCOUNTER — NON-APPOINTMENT (OUTPATIENT)
Age: 79
End: 2024-07-09

## 2024-07-09 VITALS — RESPIRATION RATE: 16 BRPM | DIASTOLIC BLOOD PRESSURE: 72 MMHG | SYSTOLIC BLOOD PRESSURE: 126 MMHG

## 2024-07-09 VITALS — HEIGHT: 69.5 IN | BODY MASS INDEX: 28.38 KG/M2 | WEIGHT: 196 LBS

## 2024-07-09 VITALS — HEART RATE: 62 BPM | OXYGEN SATURATION: 90 %

## 2024-07-09 VITALS — OXYGEN SATURATION: 94 %

## 2024-07-09 DIAGNOSIS — J84.9 INTERSTITIAL PULMONARY DISEASE, UNSPECIFIED: ICD-10-CM

## 2024-07-09 DIAGNOSIS — F17.219 NICOTINE DEPENDENCE, CIGARETTES, WITH UNSPECIFIED NICOTINE-INDUCED DISORDERS: ICD-10-CM

## 2024-07-09 PROBLEM — M06.9 RHEUMATOID ARTHRITIS, UNSPECIFIED: Chronic | Status: ACTIVE | Noted: 2024-06-12

## 2024-07-09 PROBLEM — I71.20 THORACIC AORTIC ANEURYSM, WITHOUT RUPTURE, UNSPECIFIED: Chronic | Status: ACTIVE | Noted: 2024-06-12

## 2024-07-09 PROBLEM — I48.20 CHRONIC ATRIAL FIBRILLATION, UNSPECIFIED: Chronic | Status: ACTIVE | Noted: 2024-06-12

## 2024-07-09 PROBLEM — C44.90 UNSPECIFIED MALIGNANT NEOPLASM OF SKIN, UNSPECIFIED: Chronic | Status: ACTIVE | Noted: 2024-06-12

## 2024-07-09 PROCEDURE — 99214 OFFICE O/P EST MOD 30 MIN: CPT | Mod: 25

## 2024-07-09 PROCEDURE — 99406 BEHAV CHNG SMOKING 3-10 MIN: CPT

## 2024-08-22 ENCOUNTER — APPOINTMENT (OUTPATIENT)
Dept: CARDIOLOGY | Facility: CLINIC | Age: 79
End: 2024-08-22
Payer: MEDICARE

## 2024-08-22 VITALS
RESPIRATION RATE: 16 BRPM | HEIGHT: 71 IN | DIASTOLIC BLOOD PRESSURE: 72 MMHG | HEART RATE: 73 BPM | WEIGHT: 194 LBS | BODY MASS INDEX: 27.16 KG/M2 | SYSTOLIC BLOOD PRESSURE: 126 MMHG

## 2024-08-22 DIAGNOSIS — I10 ESSENTIAL (PRIMARY) HYPERTENSION: ICD-10-CM

## 2024-08-22 DIAGNOSIS — Z00.00 ENCOUNTER FOR GENERAL ADULT MEDICAL EXAMINATION W/OUT ABNORMAL FINDINGS: ICD-10-CM

## 2024-08-22 DIAGNOSIS — D75.1 SECONDARY POLYCYTHEMIA: ICD-10-CM

## 2024-08-22 DIAGNOSIS — I77.810 THORACIC AORTIC ECTASIA: ICD-10-CM

## 2024-08-22 DIAGNOSIS — I48.91 UNSPECIFIED ATRIAL FIBRILLATION: ICD-10-CM

## 2024-08-22 DIAGNOSIS — E78.00 PURE HYPERCHOLESTEROLEMIA, UNSPECIFIED: ICD-10-CM

## 2024-08-22 PROCEDURE — 99214 OFFICE O/P EST MOD 30 MIN: CPT

## 2024-08-22 PROCEDURE — G2211 COMPLEX E/M VISIT ADD ON: CPT

## 2024-08-22 PROCEDURE — 93000 ELECTROCARDIOGRAM COMPLETE: CPT

## 2024-08-26 NOTE — PHYSICAL EXAM
[Well Developed] : well developed [No Acute Distress] : no acute distress [Normal Conjunctiva] : normal conjunctiva [Normal Venous Pressure] : normal venous pressure [No Carotid Bruit] : no carotid bruit [No Rub] : no rub [No Gallop] : no gallop [Clear Lung Fields] : clear lung fields [No Respiratory Distress] : no respiratory distress  [Normal Bowel Sounds] : normal bowel sounds [Normal Gait] : normal gait [No Edema] : no edema [No Rash] : no rash [Moves all extremities] : moves all extremities [No Focal Deficits] : no focal deficits [Normal Speech] : normal speech [Alert and Oriented] : alert and oriented [Normal memory] : normal memory [de-identified] : Overweight [de-identified] : Irregularly irregular rhythm, I-II/VI systolic murmur

## 2024-08-26 NOTE — HISTORY OF PRESENT ILLNESS
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 He denies chest pain or significant shortness of breath. He was recently evaluated by Dr. Blount because of his aortic dilatation. The patient's aortic root measures approximately 5.3 cm at the Sinus of Valsalva. There is at least moderate aortic insufficiency associated with this. He is scheduled to undergo a follow up chest CTA in approximately six months.

## 2024-08-26 NOTE — PHYSICAL EXAM
[Well Developed] : well developed [No Acute Distress] : no acute distress [Normal Conjunctiva] : normal conjunctiva [Normal Venous Pressure] : normal venous pressure [No Carotid Bruit] : no carotid bruit [No Rub] : no rub [No Gallop] : no gallop [Clear Lung Fields] : clear lung fields [No Respiratory Distress] : no respiratory distress  [Normal Bowel Sounds] : normal bowel sounds [Normal Gait] : normal gait [No Edema] : no edema [No Rash] : no rash [Moves all extremities] : moves all extremities [No Focal Deficits] : no focal deficits [Normal Speech] : normal speech [Alert and Oriented] : alert and oriented [Normal memory] : normal memory [de-identified] : Overweight [de-identified] : Irregularly irregular rhythm, I-II/VI systolic murmur

## 2024-08-26 NOTE — ASSESSMENT
[FreeTextEntry1] : 1. EKG today reveals atrial fibrillation with a controlled ventricular response. Qs in Leads III and aVF. Nonspecific ST-T wave changes.  2. Chronic atrial fibrillation: The patient remains in a-fib with a controlled ventricular response. Tolerating Eliquis therapy well.   3. Hyperlipidemia: Review of most recent blood work demonstrates a total cholesterol of 158, HDL 71, TC/HDL ratio 2.2, LDL 68, triglycerides 106. The patient is advised to continue current Rosuvastatin therapy and follow a low-fat / low-cholesterol diet.   4. Renal insufficiency: The patient is currently undergoing 24 hour urine collection by nephrologist.   5. Elevated H&H: The patient is followed by hematology with periodic phlebotomies.   6. S/P Fall this summer: The patient apparently struck his head and had a mild concussion. There was a small brain bleed which was monitored by way of serial head CT scans. The patient recovered uneventfully.   7. The patient is advised to walk as much as possible and undergo follow up blood work prior to next office visit in approximately four months.

## 2024-09-26 ENCOUNTER — RX RENEWAL (OUTPATIENT)
Age: 79
End: 2024-09-26

## 2024-10-12 ENCOUNTER — INPATIENT (INPATIENT)
Facility: HOSPITAL | Age: 79
LOS: 1 days | Discharge: HOME | DRG: 86 | End: 2024-10-14
Attending: STUDENT IN AN ORGANIZED HEALTH CARE EDUCATION/TRAINING PROGRAM | Admitting: STUDENT IN AN ORGANIZED HEALTH CARE EDUCATION/TRAINING PROGRAM
Payer: MEDICARE

## 2024-10-12 VITALS
TEMPERATURE: 99 F | OXYGEN SATURATION: 100 % | DIASTOLIC BLOOD PRESSURE: 87 MMHG | SYSTOLIC BLOOD PRESSURE: 140 MMHG | RESPIRATION RATE: 19 BRPM | HEART RATE: 82 BPM

## 2024-10-12 DIAGNOSIS — S02.0XXA FRACTURE OF VAULT OF SKULL, INITIAL ENCOUNTER FOR CLOSED FRACTURE: ICD-10-CM

## 2024-10-12 DIAGNOSIS — Z98.49 CATARACT EXTRACTION STATUS, UNSPECIFIED EYE: Chronic | ICD-10-CM

## 2024-10-12 DIAGNOSIS — Z90.49 ACQUIRED ABSENCE OF OTHER SPECIFIED PARTS OF DIGESTIVE TRACT: Chronic | ICD-10-CM

## 2024-10-12 LAB
ALBUMIN SERPL ELPH-MCNC: 2.9 G/DL — LOW (ref 3.3–5)
ALP SERPL-CCNC: 78 U/L — SIGNIFICANT CHANGE UP (ref 40–120)
ALT FLD-CCNC: 28 U/L — SIGNIFICANT CHANGE UP (ref 12–78)
ANION GAP SERPL CALC-SCNC: 4 MMOL/L — LOW (ref 5–17)
APPEARANCE UR: CLEAR — SIGNIFICANT CHANGE UP
APTT BLD: 31.2 SEC — SIGNIFICANT CHANGE UP (ref 24.5–35.6)
AST SERPL-CCNC: 34 U/L — SIGNIFICANT CHANGE UP (ref 15–37)
BACTERIA # UR AUTO: NEGATIVE /HPF — SIGNIFICANT CHANGE UP
BASOPHILS # BLD AUTO: 0.03 K/UL — SIGNIFICANT CHANGE UP (ref 0–0.2)
BASOPHILS NFR BLD AUTO: 0.7 % — SIGNIFICANT CHANGE UP (ref 0–2)
BILIRUB SERPL-MCNC: 0.5 MG/DL — SIGNIFICANT CHANGE UP (ref 0.2–1.2)
BILIRUB UR-MCNC: NEGATIVE — SIGNIFICANT CHANGE UP
BUN SERPL-MCNC: 32 MG/DL — HIGH (ref 7–23)
CALCIUM SERPL-MCNC: 9.9 MG/DL — SIGNIFICANT CHANGE UP (ref 8.5–10.1)
CAST: 2 /LPF — SIGNIFICANT CHANGE UP (ref 0–4)
CHLORIDE SERPL-SCNC: 109 MMOL/L — HIGH (ref 96–108)
CO2 SERPL-SCNC: 28 MMOL/L — SIGNIFICANT CHANGE UP (ref 22–31)
COLOR SPEC: YELLOW — SIGNIFICANT CHANGE UP
CREAT SERPL-MCNC: 1.32 MG/DL — HIGH (ref 0.5–1.3)
DIFF PNL FLD: ABNORMAL
EGFR: 54 ML/MIN/1.73M2 — LOW
EOSINOPHIL # BLD AUTO: 0.09 K/UL — SIGNIFICANT CHANGE UP (ref 0–0.5)
EOSINOPHIL NFR BLD AUTO: 2.1 % — SIGNIFICANT CHANGE UP (ref 0–6)
ETHANOL SERPL-MCNC: <10 MG/DL — SIGNIFICANT CHANGE UP (ref 0–10)
GLUCOSE SERPL-MCNC: 103 MG/DL — HIGH (ref 70–99)
GLUCOSE UR QL: NEGATIVE MG/DL — SIGNIFICANT CHANGE UP
HCT VFR BLD CALC: 47.6 % — SIGNIFICANT CHANGE UP (ref 39–50)
HGB BLD-MCNC: 15.7 G/DL — SIGNIFICANT CHANGE UP (ref 13–17)
IMM GRANULOCYTES NFR BLD AUTO: 0.7 % — SIGNIFICANT CHANGE UP (ref 0–0.9)
INR BLD: 1.05 RATIO — SIGNIFICANT CHANGE UP (ref 0.85–1.16)
KETONES UR-MCNC: NEGATIVE MG/DL — SIGNIFICANT CHANGE UP
LEUKOCYTE ESTERASE UR-ACNC: ABNORMAL
LYMPHOCYTES # BLD AUTO: 1.09 K/UL — SIGNIFICANT CHANGE UP (ref 1–3.3)
LYMPHOCYTES # BLD AUTO: 25.8 % — SIGNIFICANT CHANGE UP (ref 13–44)
MANUAL SMEAR VERIFICATION: SIGNIFICANT CHANGE UP
MCHC RBC-ENTMCNC: 30.1 PG — SIGNIFICANT CHANGE UP (ref 27–34)
MCHC RBC-ENTMCNC: 33 GM/DL — SIGNIFICANT CHANGE UP (ref 32–36)
MCV RBC AUTO: 91.4 FL — SIGNIFICANT CHANGE UP (ref 80–100)
MONOCYTES # BLD AUTO: 0.89 K/UL — SIGNIFICANT CHANGE UP (ref 0–0.9)
MONOCYTES NFR BLD AUTO: 21 % — HIGH (ref 2–14)
NEUTROPHILS # BLD AUTO: 2.1 K/UL — SIGNIFICANT CHANGE UP (ref 1.8–7.4)
NEUTROPHILS NFR BLD AUTO: 49.7 % — SIGNIFICANT CHANGE UP (ref 43–77)
NITRITE UR-MCNC: NEGATIVE — SIGNIFICANT CHANGE UP
PH UR: 5 — SIGNIFICANT CHANGE UP (ref 5–8)
PLAT MORPH BLD: NORMAL — SIGNIFICANT CHANGE UP
PLATELET # BLD AUTO: 131 K/UL — LOW (ref 150–400)
POTASSIUM SERPL-MCNC: 5.1 MMOL/L — SIGNIFICANT CHANGE UP (ref 3.5–5.3)
POTASSIUM SERPL-SCNC: 5.1 MMOL/L — SIGNIFICANT CHANGE UP (ref 3.5–5.3)
PROT SERPL-MCNC: 6.9 GM/DL — SIGNIFICANT CHANGE UP (ref 6–8.3)
PROT UR-MCNC: 30 MG/DL
PROTHROM AB SERPL-ACNC: 12.1 SEC — SIGNIFICANT CHANGE UP (ref 9.9–13.4)
RBC # BLD: 5.21 M/UL — SIGNIFICANT CHANGE UP (ref 4.2–5.8)
RBC # FLD: 14.4 % — SIGNIFICANT CHANGE UP (ref 10.3–14.5)
RBC BLD AUTO: NORMAL — SIGNIFICANT CHANGE UP
RBC CASTS # UR COMP ASSIST: 82 /HPF — HIGH (ref 0–4)
SODIUM SERPL-SCNC: 141 MMOL/L — SIGNIFICANT CHANGE UP (ref 135–145)
SP GR SPEC: 1.02 — SIGNIFICANT CHANGE UP (ref 1–1.03)
SQUAMOUS # UR AUTO: 0 /HPF — SIGNIFICANT CHANGE UP (ref 0–5)
UROBILINOGEN FLD QL: 1 MG/DL — SIGNIFICANT CHANGE UP (ref 0.2–1)
WBC # BLD: 4.23 K/UL — SIGNIFICANT CHANGE UP (ref 3.8–10.5)
WBC # FLD AUTO: 4.23 K/UL — SIGNIFICANT CHANGE UP (ref 3.8–10.5)
WBC UR QL: 5 /HPF — SIGNIFICANT CHANGE UP (ref 0–5)

## 2024-10-12 PROCEDURE — 99285 EMERGENCY DEPT VISIT HI MDM: CPT

## 2024-10-12 PROCEDURE — 73100 X-RAY EXAM OF WRIST: CPT | Mod: 26,LT,XE

## 2024-10-12 PROCEDURE — 99222 1ST HOSP IP/OBS MODERATE 55: CPT

## 2024-10-12 PROCEDURE — 36415 COLL VENOUS BLD VENIPUNCTURE: CPT

## 2024-10-12 PROCEDURE — 80048 BASIC METABOLIC PNL TOTAL CA: CPT

## 2024-10-12 PROCEDURE — 84100 ASSAY OF PHOSPHORUS: CPT

## 2024-10-12 PROCEDURE — 71250 CT THORAX DX C-: CPT | Mod: 26,MC

## 2024-10-12 PROCEDURE — 87641 MR-STAPH DNA AMP PROBE: CPT

## 2024-10-12 PROCEDURE — 73060 X-RAY EXAM OF HUMERUS: CPT | Mod: 26,RT

## 2024-10-12 PROCEDURE — 70486 CT MAXILLOFACIAL W/O DYE: CPT | Mod: 26,MC

## 2024-10-12 PROCEDURE — 83735 ASSAY OF MAGNESIUM: CPT

## 2024-10-12 PROCEDURE — 73110 X-RAY EXAM OF WRIST: CPT | Mod: 26,LT

## 2024-10-12 PROCEDURE — 73030 X-RAY EXAM OF SHOULDER: CPT | Mod: 26,RT

## 2024-10-12 PROCEDURE — 93010 ELECTROCARDIOGRAM REPORT: CPT

## 2024-10-12 PROCEDURE — 72125 CT NECK SPINE W/O DYE: CPT | Mod: 26,MC

## 2024-10-12 PROCEDURE — 87640 STAPH A DNA AMP PROBE: CPT

## 2024-10-12 PROCEDURE — 70450 CT HEAD/BRAIN W/O DYE: CPT | Mod: 26,MC

## 2024-10-12 PROCEDURE — 85025 COMPLETE CBC W/AUTO DIFF WBC: CPT

## 2024-10-12 PROCEDURE — 76376 3D RENDER W/INTRP POSTPROCES: CPT | Mod: 26

## 2024-10-12 PROCEDURE — 74176 CT ABD & PELVIS W/O CONTRAST: CPT | Mod: 26,MC

## 2024-10-12 NOTE — ED PROVIDER NOTE - OBJECTIVE STATEMENT
79-year-old male with a past medical history of atrial fibrillation on Eliquis, who presents with chief complaint of a head injury.  Patient had status post fall, mechanical, hit his forehead and face on the ground.  No LOC.  Complaining of pain to the face and the forehead, thoracic back, right shoulder and humerus.  Denies any other symptoms or concerns.  Denies alcohol use tonight.  Patient made neuro alert on arrival.

## 2024-10-12 NOTE — ED PROVIDER NOTE - CLINICAL SUMMARY MEDICAL DECISION MAKING FREE TEXT BOX
Patient is 20/25, no proptosis, normal extraocular movements, pupils are equal round reactive to light and accommodation, IOP 12.  Discussed with [facility] ophthalmology on-call, states that no acute interventions, patient can follow-up in clinic in 1 week. Appreciate neurosurgery consult, recommending 24-hour observation given is on anticoagulation. Discussed with trauma surgery resident, to consult on patient.

## 2024-10-12 NOTE — ED PROVIDER NOTE - PHYSICAL EXAMINATION
Patient awake, alert, orient x 3  Breath smells of alcohol  Abrasions to forehead and nasal bridge, no tenderness to the head or face, no deformity present  C-spine and collar, no tenderness  Heart sounds within normal limits  Breath sounds within normal limits bilaterally  Right shoulder and humerus tenderness palpation, no deformity, limb is neurovascularly intact, compartments are soft Patient awake, alert, orient x 3  Abrasions to forehead and nasal bridge, no tenderness to the head or face, no deformity present  C-spine and collar, no tenderness  Heart sounds within normal limits  Breath sounds within normal limits bilaterally  Right shoulder and humerus tenderness palpation, no deformity, limb is neurovascularly intact, compartments are soft

## 2024-10-12 NOTE — ED PROVIDER NOTE - CARE PLAN
1 Principal Discharge DX:	Fracture of frontal bone  Secondary Diagnosis:	Frontal sinus fracture  Secondary Diagnosis:	Fracture of orbital roof, right side, initial encounter for closed fracture  Secondary Diagnosis:	Left wrist fracture

## 2024-10-12 NOTE — ED ADULT TRIAGE NOTE - CHIEF COMPLAINT QUOTE
Pt BIBEMS s/p unwitnessed  trip and fall. Pt states he was walking when he tripped on a curb landing on his face. (+) headstrike (+) blood thinners (-) LOC. States he had ringing ears after fall. EMS placed c-collar on pt en route. Pt is awake and alert, GCS 15. MD Arevalo called for eval, neuro alert called @2029. Taken to CT. Also endorsing right arm pain. nkda.

## 2024-10-12 NOTE — ED PROVIDER NOTE - PROGRESS NOTE DETAILS
Discussed with neurosurgery given frontal bone fracture, will evaluate patient in department.  Orthopedics also reduced and splinted patient's wrist.

## 2024-10-12 NOTE — ED PROVIDER NOTE - SECONDARY DIAGNOSIS.
Frontal sinus fracture Left wrist fracture Fracture of orbital roof, right side, initial encounter for closed fracture

## 2024-10-12 NOTE — ED ADULT NURSE NOTE - NSFALLHARMRISKINTERV_ED_ALL_ED

## 2024-10-12 NOTE — ED ADULT NURSE NOTE - OBJECTIVE STATEMENT
Pt presents to ED A&Ox4, s/p trip and fall. Pt states was leaving restaurant when he tripped over a curb, + headstrike, - LOC, on blood thinners. Pt presents with c-collar in place, abrasion to forehead and swelling to left wrist. Pt complaining of generalized pain. No active bleeding noted. No acute distress noted. IV placed, blood draw collected. Safety measures in place.

## 2024-10-13 LAB
MRSA PCR RESULT.: SIGNIFICANT CHANGE UP
S AUREUS DNA NOSE QL NAA+PROBE: SIGNIFICANT CHANGE UP

## 2024-10-13 PROCEDURE — 99232 SBSQ HOSP IP/OBS MODERATE 35: CPT

## 2024-10-13 PROCEDURE — 99222 1ST HOSP IP/OBS MODERATE 55: CPT

## 2024-10-13 RX ORDER — LIDOCAINE 50 MG/G
1 CREAM TOPICAL EVERY 24 HOURS
Refills: 0 | Status: DISCONTINUED | OUTPATIENT
Start: 2024-10-13 | End: 2024-10-14

## 2024-10-13 RX ORDER — OXYCODONE HYDROCHLORIDE 30 MG/1
5 TABLET, FILM COATED, EXTENDED RELEASE ORAL EVERY 6 HOURS
Refills: 0 | Status: DISCONTINUED | OUTPATIENT
Start: 2024-10-13 | End: 2024-10-14

## 2024-10-13 RX ORDER — ACETAMINOPHEN 325 MG
650 TABLET ORAL EVERY 6 HOURS
Refills: 0 | Status: DISCONTINUED | OUTPATIENT
Start: 2024-10-13 | End: 2024-10-14

## 2024-10-13 RX ORDER — FUROSEMIDE 10 MG/ML
20 INJECTION INTRAVENOUS DAILY
Refills: 0 | Status: DISCONTINUED | OUTPATIENT
Start: 2024-10-13 | End: 2024-10-14

## 2024-10-13 RX ORDER — SODIUM CHLORIDE 0.9 % (FLUSH) 0.9 %
1000 SYRINGE (ML) INJECTION
Refills: 0 | Status: DISCONTINUED | OUTPATIENT
Start: 2024-10-13 | End: 2024-10-14

## 2024-10-13 RX ORDER — ONDANSETRON HCL/PF 4 MG/2 ML
4 VIAL (ML) INJECTION EVERY 6 HOURS
Refills: 0 | Status: DISCONTINUED | OUTPATIENT
Start: 2024-10-13 | End: 2024-10-14

## 2024-10-13 RX ORDER — ROSUVASTATIN CALCIUM 20 MG/1
20 TABLET, COATED ORAL AT BEDTIME
Refills: 0 | Status: DISCONTINUED | OUTPATIENT
Start: 2024-10-13 | End: 2024-10-14

## 2024-10-13 RX ORDER — CHLORHEXIDINE GLUCONATE ORAL RINSE 1.2 MG/ML
1 SOLUTION DENTAL
Refills: 0 | Status: DISCONTINUED | OUTPATIENT
Start: 2024-10-13 | End: 2024-10-14

## 2024-10-13 RX ORDER — BACITRACIN 500 [USP'U]/G
1 OINTMENT TOPICAL DAILY
Refills: 0 | Status: DISCONTINUED | OUTPATIENT
Start: 2024-10-13 | End: 2024-10-14

## 2024-10-13 RX ORDER — METOPROLOL TARTRATE 50 MG
50 TABLET ORAL DAILY
Refills: 0 | Status: DISCONTINUED | OUTPATIENT
Start: 2024-10-13 | End: 2024-10-14

## 2024-10-13 RX ORDER — PANTOPRAZOLE SODIUM 40 MG/1
40 TABLET, DELAYED RELEASE ORAL
Refills: 0 | Status: DISCONTINUED | OUTPATIENT
Start: 2024-10-13 | End: 2024-10-14

## 2024-10-13 RX ORDER — ACETAMINOPHEN 325 MG
1000 TABLET ORAL ONCE
Refills: 0 | Status: COMPLETED | OUTPATIENT
Start: 2024-10-13 | End: 2024-10-13

## 2024-10-13 RX ORDER — MORPHINE SULFATE 30 MG/1
4 TABLET, FILM COATED, EXTENDED RELEASE ORAL EVERY 4 HOURS
Refills: 0 | Status: DISCONTINUED | OUTPATIENT
Start: 2024-10-13 | End: 2024-10-14

## 2024-10-13 RX ADMIN — Medication 1000 MILLIGRAM(S): at 20:00

## 2024-10-13 RX ADMIN — Medication 400 MILLIGRAM(S): at 11:00

## 2024-10-13 RX ADMIN — ROSUVASTATIN CALCIUM 20 MILLIGRAM(S): 20 TABLET, COATED ORAL at 23:01

## 2024-10-13 RX ADMIN — Medication 50 MILLIGRAM(S): at 09:54

## 2024-10-13 RX ADMIN — PANTOPRAZOLE SODIUM 40 MILLIGRAM(S): 40 TABLET, DELAYED RELEASE ORAL at 08:10

## 2024-10-13 RX ADMIN — CHLORHEXIDINE GLUCONATE ORAL RINSE 1 APPLICATION(S): 1.2 SOLUTION DENTAL at 08:22

## 2024-10-13 RX ADMIN — Medication 400 MILLIGRAM(S): at 19:28

## 2024-10-13 RX ADMIN — Medication 1000 MILLIGRAM(S): at 12:00

## 2024-10-13 RX ADMIN — BACITRACIN 1 APPLICATION(S): 500 OINTMENT TOPICAL at 11:00

## 2024-10-13 RX ADMIN — Medication 75 MILLILITER(S): at 06:28

## 2024-10-13 RX ADMIN — FUROSEMIDE 20 MILLIGRAM(S): 10 INJECTION INTRAVENOUS at 09:54

## 2024-10-13 RX ADMIN — Medication 1 TABLET(S): at 02:10

## 2024-10-13 NOTE — PROGRESS NOTE ADULT - SUBJECTIVE AND OBJECTIVE BOX
HPI:  Patient is an 79-year-old male with a past medical history of Afib on Eliquis, known to our service s/p fall June 2024 with small falx SDH, who presents to  ER s/p mechanical fall this evening.  Patient states he was walking and tripped on the curb, hitting his face.  No LOC. GCS 15. Complaining of pain to the face and the forehead, thoracic back, right shoulder and L wrist. Awake/alert, he denies HA, n/v, numb/tingling, weakness, visual changes. HCT revealed acute nondisplaced fracture Right frontal bone that extends Right frontal sinus and orbital roof. No intracranial hemorrhage.    10/12- Patient seen and examined, resting comfortably. Denies headache, dizziness, weakness, n/v, changes in vision.     acetaminophen     Tablet .. 650 milliGRAM(s) Oral every 6 hours PRN  bacitracin   Ointment 1 Application(s) Topical daily  chlorhexidine 4% Liquid 1 Application(s) Topical <User Schedule>  furosemide    Tablet 20 milliGRAM(s) Oral daily  lidocaine   4% Patch 1 Patch Transdermal every 24 hours  metoprolol succinate ER 50 milliGRAM(s) Oral daily  morphine  - Injectable 4 milliGRAM(s) IV Push every 4 hours PRN  ondansetron Injectable 4 milliGRAM(s) IV Push every 6 hours PRN  oxyCODONE    IR 5 milliGRAM(s) Oral every 6 hours PRN  pantoprazole    Tablet 40 milliGRAM(s) Oral before breakfast  rosuvastatin 20 milliGRAM(s) Oral at bedtime  sodium chloride 0.9%. 1000 milliLiter(s) IV Continuous <Continuous>        PHYSICAL EXAM:  Constitutional: awake and alert  HEENT: PERRL, EOMI, + right forehead abrasion, +abrasion nasion no active bleeding  Neck: Supple  Respiratory: Breath sounds are clear bilaterally  Cardiovascular: regular rhythm  Gastrointestinal: soft, nontender  Extremities:  no edema  Musculoskeletal: L arm splint  Skin: No rashes    Neurological exam:  HF: AA&O x 3. Appropriately interactive, normal affect. Speech fluent, No Aphasia or paraphasic errors. Naming /repetition intact   CN: PERRL, EOMI, VFF, facial sensation normal, no NLFD, tongue midline, Palate moves equally, SCM equal bilaterally  Motor: Strength 5/5 except LUE limited 2/2 splint wiggles fingers on left  Sens: Intact to light touch  Coord:  No FNFA, dysmetria on right  Gait/Balance: Cannot test      Vital Signs Last 24 Hrs  T(C): 36.7 (13 Oct 2024 03:00), Max: 37.1 (12 Oct 2024 20:51)  T(F): 98 (13 Oct 2024 03:00), Max: 98.7 (12 Oct 2024 20:51)  HR: 76 (13 Oct 2024 08:00) (64 - 82)  BP: 126/95 (13 Oct 2024 08:00) (90/51 - 140/87)  BP(mean): 107 (13 Oct 2024 08:00) (62 - 107)  RR: 29 (13 Oct 2024 08:00) (14 - 29)  SpO2: 96% (13 Oct 2024 08:00) (95% - 100%)    Parameters below as of 13 Oct 2024 08:00  Patient On (Oxygen Delivery Method): room air                              15.7   4.23  )-----------( 131      ( 12 Oct 2024 21:11 )             47.6       10-12    141  |  109[H]  |  32[H]  ----------------------------<  103[H]  5.1   |  28  |  1.32[H]    Ca    9.9      12 Oct 2024 21:11    TPro  6.9  /  Alb  2.9[L]  /  TBili  0.5  /  DBili  x   /  AST  34  /  ALT  28  /  AlkPhos  78  10-12      PT/INR - ( 12 Oct 2024 21:11 )   PT: 12.1 sec;   INR: 1.05 ratio         PTT - ( 12 Oct 2024 21:11 )  PTT:31.2 sec        Radiology:  CT Head No Cont (10.12.24 @ 20:57)   IMPRESSION:  CT HEAD:  1. No acute intracranial hemorrhage, mass effect, or midline shift.  2. Acute comminuted and nondisplaced right frontal calvarial fracture   extending to the right frontal sinus.    CT MAXILLOFACIAL:  Acute nondisplaced fractures of the anterior and posterior walls of the   right frontal sinus with the anterior wall fracture extending to right   orbital roof.    CT CERVICAL SPINE:  Multilevel degenerative changes without evidence of a fracture.

## 2024-10-13 NOTE — H&P ADULT - ASSESSMENT
A/P;  78 yo M s/p fall with acute nondisplaced fracture Right frontal bone that extends Right frontal sinus and orbital roof. No intracranial hemorrhage  with L distal radius fracture    P:  Admit to Trauma under Dr. Cote, SICU    diet  pain control  Ortho consult  Opthalmology consult- no intervention at [facility] f/u 1 week  OMFS consult pending  NS eval- observation 24 hrs, q2 neurochecks, hold Eliquis  GI ppx  zofran prn n/v  home meds    plan d/w attending

## 2024-10-13 NOTE — CHART NOTE - NSCHARTNOTEFT_GEN_A_CORE
Had a lenghty discussion with patient and Dr. Zambrano regarding potential operative treatment of his fracture. Patient is electing to undergo surgical procedure. Please hold Eliquis while patient is admitted to hospital prior to procedure.

## 2024-10-13 NOTE — ED ADULT NURSE REASSESSMENT NOTE - NS ED NURSE REASSESS COMMENT FT1
Spoke to MD Kern regarding blood glucose checks q1 hours. As per MD, order to be dcd.  SICU made aware.
Pt had 1 episode of slight nose bleed. Dr. larry made aware. Bleeding controlled. Denies any symptoms.

## 2024-10-13 NOTE — H&P ADULT - ATTENDING COMMENTS
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 He was complaining of pain over his right side of the face as well as pain in his left wrist. He also has mild pain in his mid back with movement. He denies any loss of consciousness or nasal or ear discharge.     On exam:  GCS of 15  Airway is patent  Breathing is symmetric and unlabored  Neuro: CNII-XII grossly intact  Psych: normal affect  HEENT: Normocephalic, PELON, EOM wnl, no otorrhea or hemotympanum b/l, no epistaxis or d/c b/l nares, front forehead/nasal bridge abrasion    Neck: No crepitus, no ecchymosis, no hematoma, to exam, no JVD, no tracheal deviation  Cspine/thoracolumbrosacral spine: no c-spine but having Tenderness upper thoracic spine   Cardiovascular: Irregularly irregular  Chest: no gross rib pathology or tenderness to exam. No sternal pathology or tenderness to exam. No crepitus, no ecchymosis, no hematoma. No penetrating thorcoabdominal trauma  Respiratory: Rate is 18; Respiratory Effort normal; no wheezes, rales or rhonchi to exam  ABD: bowel sounds (+), soft, nontender, non distended, no rebound, no guarding, no rigidity, no skin changes to exam. No pelvic instability to exam, no skin changes  Genitourinary: No scrotal/perineal/perirectal hematoma/ecchymosis/tenderness to exam  External genitalia: normal, no blood at urethral meatus  Musculoskeletal: Pt has palpable b/l radial, femoral, dorsalis pedis pulses. All digits are warm and well perfused. Left forearm in spline 2/2 radial fx Pt demonstrates grossly intact sensoromotor function. Pt has good capillary refill to digits, no calf edema or tenderness to exam.  Skin: no lesions or rashes to exam    Assessment and plan:  An 79 year old male with traumatic right sided frontal sinus fracture and superior orbital wall fracture, Neurosurgery, OMFS and ophthalmology contacted, no surgical intervention needed  Left distal radius fracture, s/p closed reduction, follow up with ortho  Hold Eliquis for 2 days  PT/OT  Home medications  Analgesia  Regular diet  Antibiotics for prophylaxis (Augmentin)  DVT prophylaxis  OMFS and ophthalmology services contacted by ED, no surgical intervention needed, will be given appointments for follow up

## 2024-10-13 NOTE — DIETITIAN INITIAL EVALUATION ADULT - ADD RECOMMEND
1. c/w current diet to optimize nutritional status  2. High-protein mousse BID (provides 394 kcal, 17g protein/ container)   3. MVI w/ minerals daily to ensure 100% RDA met  4. Consider adding thiamine 100 mg daily 2/2 poor PO intake/ malnutrition   5. Monitor bowel movements, if no BM for >3 days, consider implementing bowel regimen.   6. Monitor daily lytes/min and replete prn   7. Obtain weekly wt to track/trend changes   8. Confirm goals of care regarding nutrition support   RD will continue to monitor PO intake, labs, hydration, and wt prn.

## 2024-10-13 NOTE — CONSULT NOTE ADULT - ASSESSMENT
Patient is an 79-year-old male with a past medical history of Afib on Eliquis, known to our service s/p fall June 2024 with small falx SDH, who presents to  ER s/p mechanical fall this evening. HCT revealed acute nondisplaced fracture Right frontal bone that extends Right frontal sinus and orbital roof. No intracranial hemorrhage.    Plan:  - No acute neurosurgical intervention indicated  - Recommend 24hr observation given head injury and AC use  - Pain control  - RHCT if change in mental status  - Neuro checks [id number]   - OMFS and Trauma eval  - Prophylactic antibx  - Ortho consult appreciated L distal radius fx.   - Hold Eliquis per Ortho   - Discussed with ED team    D/w Dr. Jiang  
A/P: 79 male S/P mechanical fall sustaining frontal bone Fx and L distal radial fx  Chronic AFIB on DOAC  RA    Plan:  Pain control  No neurosurgerical intervention  Per Neuro Surg will need to F/U with OMFS  Continue metoprolol for rate control  Resume AC as per Neuro surg  Ortho to take to the OR next week for wrist repair  PO Diet    Can transfer to Med Surg under Surgery

## 2024-10-13 NOTE — PATIENT PROFILE ADULT - FALL HARM RISK - HARM RISK INTERVENTIONS

## 2024-10-13 NOTE — DIETITIAN INITIAL EVALUATION ADULT - PERTINENT LABORATORY DATA
10-12    141  |  109[H]  |  32[H]  ----------------------------<  103[H]  5.1   |  28  |  1.32[H]    Ca    9.9      12 Oct 2024 21:11    TPro  6.9  /  Alb  2.9[L]  /  TBili  0.5  /  DBili  x   /  AST  34  /  ALT  28  /  AlkPhos  78  10-12

## 2024-10-13 NOTE — H&P ADULT - HISTORY OF PRESENT ILLNESS
80 yo M with PMH of Afib on Eliquis last dose this morning, s/p fall June 2024 with small falx SDH, who presents to  ER s/p mechanical fall this evening.  Patient states he was walking and tripped on the curb, hitting his face.  No LOC with +HS. Pt is complaining of pain to the face and the forehead, thoracic back, right shoulder and L wrist. Pt denies HA, fever or chills, n/v, numb/tingling, weakness, visual changes. NO other acute complaints.     HCT revealed acute nondisplaced fracture Right frontal bone that extends Right frontal sinus and orbital roof. No intracranial hemorrhage.    PMH  Chronic atrial fibrillation    Rheumatoid arthritis    Aneurysm, aorta, thoracic    Skin cancer      PSH  S/P cataract surgery    S/P appendectomy      MEDS    Allergies    No Known Allergies    Intolerances        Social

## 2024-10-13 NOTE — DIETITIAN INITIAL EVALUATION ADULT - PERTINENT MEDS FT
MEDICATIONS  (STANDING):  bacitracin   Ointment 1 Application(s) Topical daily  chlorhexidine 4% Liquid 1 Application(s) Topical <User Schedule>  furosemide    Tablet 20 milliGRAM(s) Oral daily  lidocaine   4% Patch 1 Patch Transdermal every 24 hours  metoprolol succinate ER 50 milliGRAM(s) Oral daily  pantoprazole    Tablet 40 milliGRAM(s) Oral before breakfast  rosuvastatin 20 milliGRAM(s) Oral at bedtime  sodium chloride 0.9%. 1000 milliLiter(s) (75 mL/Hr) IV Continuous <Continuous>    MEDICATIONS  (PRN):  acetaminophen     Tablet .. 650 milliGRAM(s) Oral every 6 hours PRN Temp greater or equal to 38C (100.4F), Mild Pain (1 - 3)  morphine  - Injectable 4 milliGRAM(s) IV Push every 4 hours PRN Severe Pain (7 - 10)  ondansetron Injectable 4 milliGRAM(s) IV Push every 6 hours PRN Nausea  oxyCODONE    IR 5 milliGRAM(s) Oral every 6 hours PRN Moderate Pain (4 - 6)

## 2024-10-13 NOTE — H&P ADULT - NSHPLABSRESULTS_GEN_ALL_CORE
Labs:                        15.7   4.23  )-----------( 131      ( 12 Oct 2024 21:11 )             47.6     10-12    141  |  109[H]  |  32[H]  ----------------------------<  103[H]  5.1   |  28  |  1.32[H]    Ca    9.9      12 Oct 2024 21:11    TPro  6.9  /  Alb  2.9[L]  /  TBili  0.5  /  DBili  x   /  AST  34  /  ALT  28  /  AlkPhos  78  10-12    PT/INR - ( 12 Oct 2024 21:11 )   PT: 12.1 sec;   INR: 1.05 ratio         PTT - ( 12 Oct 2024 21:11 )  PTT:31.2 sec  Urinalysis Basic - ( 12 Oct 2024 23:25 )    Color: Yellow / Appearance: Clear / S.020 / pH: x  Gluc: x / Ketone: Negative mg/dL  / Bili: Negative / Urobili: 1.0 mg/dL   Blood: x / Protein: 30 mg/dL / Nitrite: Negative   Leuk Esterase: Trace / RBC: 82 /HPF / WBC 5 /HPF   Sq Epi: x / Non Sq Epi: 0 /HPF / Bacteria: Negative /HPF        Imaging  < from: CT Head No Cont (10.12.24 @ 20:57) >    IMPRESSION:    CT HEAD:  1. No acute intracranial hemorrhage, mass effect, or midline shift.  2. Acute comminuted and nondisplaced right frontal calvarial fracture   extending to the right frontal sinus.    CT MAXILLOFACIAL:  Acute nondisplaced fractures of the anterior and posterior walls of the   right frontal sinus with the anterior wall fracture extending to right   orbital roof.    CT CERVICAL SPINE:  Multilevel degenerative changes without evidence of a fracture.      < end of copied text >    < from: CT Chest No Cont (10.12.24 @ 20:57) >    1. No evidence of acute intrathoracic, intra-abdominal or intrapelvic   trauma.  2. Mild circumferential urinary bladder wall thickening. Correlate with   urinalysis for evidence of a cystitis.      < end of copied text >

## 2024-10-13 NOTE — CONSULT NOTE ADULT - SUBJECTIVE AND OBJECTIVE BOX
Neurosurgery called at 10:05pm  Patient seen at 10:30pm    HPI:  Patient is an 79-year-old male with a past medical history of Afib on Eliquis, known to our service s/p fall June 2024 with small falx SDH, who presents to  ER s/p mechanical fall this evening.  Patient states he was walking and tripped on the curb, hitting his face.  No LOC. GCS 15 E:4 M:6 V:5. Complaining of pain to the face and the forehead, thoracic back, right shoulder and L wrist. Awake/alert, he denies HA, n/v, numb/tingling, weakness, visual changes. HCT revealed acute nondisplaced fracture Right frontal bone that extends Right frontal sinus and orbital roof. No intracranial hemorrhage.    PAST MEDICAL & SURGICAL HISTORY:  Chronic atrial fibrillation      Rheumatoid arthritis      Aneurysm, aorta, thoracic      Skin cancer      S/P cataract surgery      S/P appendectomy          FAMILY HISTORY:non contributory        Social Hx:  Nonsmoker, no drug or alcohol use    MEDICATIONS  (STANDING):       Allergies    No Known Allergies    Intolerances        ROS: Pertinent positives in HPI, all other ROS were reviewed and are negative.      Vital Signs Last 24 Hrs  T(C): 37.1 (12 Oct 2024 20:51), Max: 37.1 (12 Oct 2024 20:51)  T(F): 98.7 (12 Oct 2024 20:51), Max: 98.7 (12 Oct 2024 20:51)  HR: 82 (12 Oct 2024 20:51) (82 - 82)  BP: 140/87 (12 Oct 2024 20:51) (140/87 - 140/87)  BP(mean): --  RR: 19 (12 Oct 2024 20:51) (19 - 19)  SpO2: 100% (12 Oct 2024 20:51) (100% - 100%)    Parameters below as of 12 Oct 2024 20:51  Patient On (Oxygen Delivery Method): room air          PHYSICAL EXAM:  Constitutional: awake and alert  HEENT: PERRLA, EOMI, + forehead skin tear, +abrasion nasion no active bleeding  Neck: Supple  Respiratory: Breath sounds are clear bilaterally  Cardiovascular: S1 and S2, rregular rhythm  Gastrointestinal: soft, nontender  Extremities:  no edema  Musculoskeletal: + TTP upper thoracic midline no ecchymosis. L arm splint  Skin: No rashes    Neurological exam:  HF: A x O x 3. Appropriately interactive, normal affect. Speech fluent, No Aphasia or paraphasic errors. Naming /repetition intact   CN: DEB, EOMI, VFF, facial sensation normal, no NLFD, tongue midline, Palate moves equally, SCM equal bilaterally  Motor: Strength 5/5 except LUE limited 2/2 splint wiggles fingers on left  Sens: Intact to light touch  Coord:  No FNFA, dysmetria on right  Gait/Balance: Cannot test          Labs:                        15.7   4.23  )-----------( 131      ( 12 Oct 2024 21:11 )             47.6     10-12    141  |  109[H]  |  32[H]  ----------------------------<  103[H]  5.1   |  28  |  1.32[H]    Ca    9.9      12 Oct 2024 21:11    TPro  6.9  /  Alb  2.9[L]  /  TBili  0.5  /  DBili  x   /  AST  34  /  ALT  28  /  AlkPhos  78  10-12        PT/INR - ( 12 Oct 2024 21:11 )   PT: 12.1 sec;   INR: 1.05 ratio         PTT - ( 12 Oct 2024 21:11 )  PTT:31.2 sec    Radiology report:  CT 3D Reconstruct w/o Workstation (10.12.24 @ 20:58)  IMPRESSION:  CT HEAD:  1. No acute intracranial hemorrhage, mass effect, or midline shift.  2. Acute comminuted and nondisplaced right frontal calvarial fracture   extending to the right frontal sinus.    CT MAXILLOFACIAL:  Acute nondisplaced fractures of the anterior and posterior walls of the   right frontal sinus with the anterior wall fracture extending to right   orbital roof.    CT CERVICAL SPINE:  Multilevel degenerative changes without evidence of a fracture.        
79y Male community ambulatory presents c/o L wrist pain sp mechanical fall. Endorses HS. Denies LOC. Denies numbness/tingling. No other pain/injuries. Denies fevers/chills. Muliple facial fx on CT.    HEALTH ISSUES - PROBLEM Dx:      MEDICATIONS  (STANDING):    Allergies    No Known Allergies    Intolerances      Imaging: XR demonstrates L wrist fracture                        15.7   4.23  )-----------( 131      ( 12 Oct 2024 21:11 )             47.6     12 Oct 2024 21:11    141    |  109    |  32     ----------------------------<  103    5.1     |  28     |  1.32     Ca    9.9        12 Oct 2024 21:11    TPro  6.9    /  Alb  2.9    /  TBili  0.5    /  DBili  x      /  AST  34     /  ALT  28     /  AlkPhos  78     12 Oct 2024 21:11    PT/INR - ( 12 Oct 2024 21:11 )   PT: 12.1 sec;   INR: 1.05 ratio         PTT - ( 12 Oct 2024 21:11 )  PTT:31.2 sec  Vital Signs Last 24 Hrs  T(C): 37.1 (10-12-24 @ 20:51), Max: 37.1 (10-12-24 @ 20:51)  T(F): 98.7 (10-12-24 @ 20:51), Max: 98.7 (10-12-24 @ 20:51)  HR: 82 (10-12-24 @ 20:51) (82 - 82)  BP: 140/87 (10-12-24 @ 20:51) (140/87 - 140/87)  BP(mean): --  RR: 19 (10-12-24 @ 20:51) (19 - 19)  SpO2: 100% (10-12-24 @ 20:51) (100% - 100%)    Physical Exam  Gen: NAD  Head: + facial trauma, forehead and nose abrasions, ecchymosis to face   Neck: Intact AROM, no bony TTP.  LUE: Wrist swelling noted.  multiple skin tears present +TTP distal radius, no bony ttp elsewhere, compartments soft/compressive, extremity warm/well perfused. No elbow or shoulder tenderness or swelling. 2+ radial pulse, +AIN/PIN/M/U/R, SILT C5-T1      Procedure: 10 cc 1% lidocaine injected under sterile procedure into L Distal radius fracture site. Time was allowed to achieve anesthetic effect.  Closed reduction performed. Placed in well padded sugartong splint, molded appropriately. Post procedure exam unchanged, NV intact, able to wiggles all fingers, SILT distally.     A/P: 79y Male with L distal radius fracture, closed.  Pain control  NWB L UE in splint, keep c/d/I  Facial fx per Nsx   Ice/elevation  Si/sx compartment syndrome discussed with patient, told to return to ED if exhibit any  Possible need for surgical intervention in future discussed, all questions answered  Follow up with Dr. Zambrano on MONDAY , call office for appointment.  recommend holding eliquis until monday   Ortho stable for DC  
  CC:    HPI:  78 yo M with PMH of Afib on Eliquis last dose this morning, s/p fall 2024 with small falx SDH, who presents to  ER s/p mechanical fall this evening.  Patient states he was walking and tripped on the curb, hitting his face.  No LOC with +HS. Pt is complaining of pain to the face and the forehead, thoracic back, right shoulder and L wrist. Pt denies HA, fever or chills, n/v, numb/tingling, weakness, visual changes. NO other acute complaints.     HCT revealed acute nondisplaced fracture Right frontal bone that extends Right frontal sinus and orbital roof. No intracranial hemorrhage.    10/13: No events over night, to have the wrist repaired as an out patient next week    PMH  Chronic atrial fibrillation    Rheumatoid arthritis    Aneurysm, aorta, thoracic    Skin cancer      PSH  S/P cataract surgery    S/P appendectomy      MEDS    Allergies    No Known Allergies    Intolerances        Social     (13 Oct 2024 00:14)       PAST MEDICAL & SURGICAL HISTORY:  Chronic atrial fibrillation      Rheumatoid arthritis      Aneurysm, aorta, thoracic      Skin cancer      S/P cataract surgery      S/P appendectomy          FAMILY HISTORY:      Social Hx:    Allergies    No Known Allergies    Intolerances          Weight (kg): 91.8 (10-13 @ 05:00)    ICU Vital Signs Last 24 Hrs  T(C): 36.7 (13 Oct 2024 03:00), Max: 37.1 (12 Oct 2024 20:51)  T(F): 98 (13 Oct 2024 03:00), Max: 98.7 (12 Oct 2024 20:51)  HR: 58 (13 Oct 2024 11:00) (58 - 82)  BP: 104/76 (13 Oct 2024 11:00) (90/51 - 140/87)  BP(mean): 85 (13 Oct 2024 11:00) (62 - 107)  ABP: --  ABP(mean): --  RR: 23 (13 Oct 2024 11:00) (13 - 29)  SpO2: 95% (13 Oct 2024 11:00) (95% - 100%)    O2 Parameters below as of 13 Oct 2024 11:00  Patient On (Oxygen Delivery Method): room air                I&O's Summary    12 Oct 2024 07:01  -  13 Oct 2024 07:00  --------------------------------------------------------  IN: 225 mL / OUT: 0 mL / NET: 225 mL                              15.7   4.23  )-----------( 131      ( 12 Oct 2024 21:11 )             47.6       10    141  |  109[H]  |  32[H]  ----------------------------<  103[H]  5.1   |  28  |  1.32[H]    Ca    9.9      12 Oct 2024 21:11    TPro  6.9  /  Alb  2.9[L]  /  TBili  0.5  /  DBili  x   /  AST  34  /  ALT  28  /  AlkPhos  78  10                Urinalysis Basic - ( 12 Oct 2024 23:25 )    Color: Yellow / Appearance: Clear / S.020 / pH: x  Gluc: x / Ketone: Negative mg/dL  / Bili: Negative / Urobili: 1.0 mg/dL   Blood: x / Protein: 30 mg/dL / Nitrite: Negative   Leuk Esterase: Trace / RBC: 82 /HPF / WBC 5 /HPF   Sq Epi: x / Non Sq Epi: 0 /HPF / Bacteria: Negative /HPF        MEDICATIONS  (STANDING):  bacitracin   Ointment 1 Application(s) Topical daily  chlorhexidine 4% Liquid 1 Application(s) Topical <User Schedule>  furosemide    Tablet 20 milliGRAM(s) Oral daily  lidocaine   4% Patch 1 Patch Transdermal every 24 hours  metoprolol succinate ER 50 milliGRAM(s) Oral daily  pantoprazole    Tablet 40 milliGRAM(s) Oral before breakfast  rosuvastatin 20 milliGRAM(s) Oral at bedtime  sodium chloride 0.9%. 1000 milliLiter(s) (75 mL/Hr) IV Continuous <Continuous>    MEDICATIONS  (PRN):  acetaminophen     Tablet .. 650 milliGRAM(s) Oral every 6 hours PRN Temp greater or equal to 38C (100.4F), Mild Pain (1 - 3)  morphine  - Injectable 4 milliGRAM(s) IV Push every 4 hours PRN Severe Pain (7 - 10)  ondansetron Injectable 4 milliGRAM(s) IV Push every 6 hours PRN Nausea  oxyCODONE    IR 5 milliGRAM(s) Oral every 6 hours PRN Moderate Pain (4 - 6)      DVT Prophylaxis: V    Advanced Directives:  Discussed with:    Visit Information:    ** Time is exclusive of billed procedures and/or teaching and/or routine family updates.

## 2024-10-13 NOTE — DIETITIAN INITIAL EVALUATION ADULT - NSFNSGIIOFT_GEN_A_CORE
Nursing Suicide Assessment Note - Inpatient    Current assessment:    Current C-SSRS score: Negative screen= no ideation, behaviors or history      Protective Factors / Reason for Living: Social supports    Interventions:   · Continue to monitor      Patient polite and cooperative during interaction this morning. Patient resting comfortable in bed, and able to make needs known at this time. Patient denies any suicidal thoughts or ideations at this time. Patient declines interest in group activity this morning, and wishes to continue resting in bed. Will continue to closely monitor.    I&O's Detail    12 Oct 2024 07:01  -  13 Oct 2024 07:00  --------------------------------------------------------  IN:    sodium chloride 0.9%: 225 mL  Total IN: 225 mL    OUT:  Total OUT: 0 mL    Total NET: 225 mL

## 2024-10-13 NOTE — DIETITIAN INITIAL EVALUATION ADULT - ORAL INTAKE PTA/DIET HISTORY
Pt reports he lives at home by himself. Does all the food shopping/cooking without difficulty. Does not follow any diet at home. Reports " good" he usually eats 1 meal and has a protein shake each day; likely meeting <75% ENN chronically.

## 2024-10-13 NOTE — CHART NOTE - NSCHARTNOTEFT_GEN_A_CORE
In anticipation for discharge tonight versus tomorrow AM, please have patient follow up with Dr. Zambrano in the office this week following anticipated discharge.     Spoke to Dr. Zambrano who is in agreement with this plan. In anticipation for discharge tonight versus tomorrow AM, please have patient follow up with Dr. Zambrano in the office this week following anticipated discharge. Patient is ortho stable for discharge, OR will be scheduled outpatient.    Spoke to Dr. Zambrano who is in agreement with this plan.

## 2024-10-13 NOTE — H&P ADULT - NSHPPHYSICALEXAM_GEN_ALL_CORE
GCS of 15  Airway is patent  Breathing is symmetric and unlabored  Neuro: CNII-XII grossly intact  Psych: normal affect  HEENT: Normocephalic, atraumatic, PELON, EOM wnl, no otorrhea or hemotympanum b/l, no epistaxis or d/c b/l nares, front forehead/nasal bridge abrasion    Neck: No crepitus, no ecchymosis, no hematoma, to exam, no JVD, no tracheal deviation  Cspine/thoracolumbrosacral spine: no c-spine but having Tenderness upper thoracic spine   Cardiovascular: S1S2 Present  Chest: no gross rib pathology or tenderness to exam. No sternal pathology or tenderness to exam. No crepitus, no ecchymosis, no hematoma. No penetrating thorcoabdominal trauma  Respiratory: Rate is 18; Respiratory Effort normal; no wheezes, rales or rhonchi to exam  ABD: bowel sounds (+), soft, nontender, non distended, no rebound, no guarding, no rigidity, no skin changes to exam. No pelvic instability to exam, no skin changes  Genitourinary: No scrotal/perineal/perirectal hematoma/ecchymosis/tenderness to exam  External genitalia: normal, no blood at urethral meatus  Musculoskeletal: Pt has palpable b/l radial, femoral, dorsalis pedis pulses. All digits are warm and well perfused. Left forearm in spline 2/2 radial fx Pt demonstrates grossly intact sensoromotor function. Pt has good capillary refill to digits, no calf edema or tenderness to exam.  Skin: no lesions or rashes to exam

## 2024-10-13 NOTE — H&P ADULT - NSVTERISKASSESS_GEN_ALL_CORE FT
I reviewed the H&P, I examined the patient, and there are no changes in the patient's condition.   Surgical Assessment Completed on: 13-Oct-2024 00:36

## 2024-10-13 NOTE — H&P ADULT - TIME BILLING
Examination, history taking, reviewing the chart, labs and imaging and formulating and explaining the management plan to the patient

## 2024-10-13 NOTE — PROGRESS NOTE ADULT - SUBJECTIVE AND OBJECTIVE BOX
CC:Patient is a 79y old  Male who presents with a chief complaint of s/p fall with frontal bone/left radial fx (13 Oct 2024 12:20)      Subjective:  Pt seen and examined at bedside. Pt is AAOx3, pt in no acute distress. Pt denied c/o fever, chills, chest pain, SOB, abd pain, N/V/D, extremity dysfunction, hemoptysis, hematemesis, hematuria, hematochexia, headache, diplopia, vertigo, dizzyness. Pt tolerating diet, (+) void, (+) bowel function    ROS:  otherwise as abovementioned ROS    Vital Signs Last 24 Hrs  T(C): 36.7 (13 Oct 2024 17:32), Max: 37.1 (12 Oct 2024 20:51)  T(F): 98.1 (13 Oct 2024 17:32), Max: 98.7 (12 Oct 2024 20:51)  HR: 61 (13 Oct 2024 14:00) (51 - 82)  BP: 119/62 (13 Oct 2024 14:00) (90/51 - 140/87)  BP(mean): 81 (13 Oct 2024 14:00) (62 - 107)  RR: 25 (13 Oct 2024 14:00) (13 - 29)  SpO2: 94% (13 Oct 2024 12:00) (94% - 100%)    Parameters below as of 13 Oct 2024 12:00  Patient On (Oxygen Delivery Method): room air        Labs:                                15.7   4.23  )-----------( 131      ( 12 Oct 2024 21:11 )             47.6     CBC Full  -  ( 12 Oct 2024 21:11 )  WBC Count : 4.23 K/uL  RBC Count : 5.21 M/uL  Hemoglobin : 15.7 g/dL  Hematocrit : 47.6 %  Platelet Count - Automated : 131 K/uL  Mean Cell Volume : 91.4 fl  Mean Cell Hemoglobin : 30.1 pg  Mean Cell Hemoglobin Concentration : 33.0 gm/dL  Auto Neutrophil # : 2.10 K/uL  Auto Lymphocyte # : 1.09 K/uL  Auto Monocyte # : 0.89 K/uL  Auto Eosinophil # : 0.09 K/uL  Auto Basophil # : 0.03 K/uL  Auto Neutrophil % : 49.7 %  Auto Lymphocyte % : 25.8 %  Auto Monocyte % : 21.0 %  Auto Eosinophil % : 2.1 %  Auto Basophil % : 0.7 %    10-12    141  |  109[H]  |  32[H]  ----------------------------<  103[H]  5.1   |  28  |  1.32[H]    Ca    9.9      12 Oct 2024 21:11    TPro  6.9  /  Alb  2.9[L]  /  TBili  0.5  /  DBili  x   /  AST  34  /  ALT  28  /  AlkPhos  78  10-12    LIVER FUNCTIONS - ( 12 Oct 2024 21:11 )  Alb: 2.9 g/dL / Pro: 6.9 gm/dL / ALK PHOS: 78 U/L / ALT: 28 U/L / AST: 34 U/L / GGT: x           PT/INR - ( 12 Oct 2024 21:11 )   PT: 12.1 sec;   INR: 1.05 ratio         PTT - ( 12 Oct 2024 21:11 )  PTT:31.2 sec      Meds:  acetaminophen     Tablet .. 650 milliGRAM(s) Oral every 6 hours PRN  acetaminophen   IVPB .. 1000 milliGRAM(s) IV Intermittent once  bacitracin   Ointment 1 Application(s) Topical daily  chlorhexidine 4% Liquid 1 Application(s) Topical <User Schedule>  furosemide    Tablet 20 milliGRAM(s) Oral daily  lidocaine   4% Patch 1 Patch Transdermal every 24 hours  metoprolol succinate ER 50 milliGRAM(s) Oral daily  morphine  - Injectable 4 milliGRAM(s) IV Push every 4 hours PRN  ondansetron Injectable 4 milliGRAM(s) IV Push every 6 hours PRN  oxyCODONE    IR 5 milliGRAM(s) Oral every 6 hours PRN  pantoprazole    Tablet 40 milliGRAM(s) Oral before breakfast  rosuvastatin 20 milliGRAM(s) Oral at bedtime  sodium chloride 0.9%. 1000 milliLiter(s) IV Continuous <Continuous>      Radiology:      Physical exam:  GCS of 15  Pt is aaox3  Pt in no acute distress  Airway is patent  Breathing is symmetric and unlabored  Neuro: CN II-XII grossly intact  Psych: normal affect  HEENT: normocephalic, PELON, EOM wnl, known right frontal fracture. + right facial and periorbital ecchymosis and abrasions  Neck: No tracheal deviation, no JVD, no crepitus, no ecchymosis, no hematoma  Chest: No gross rib or sternal pathology or tenderness to exam, no crepitus, no ecchymosis, no hematoma  Resp: CTAB  CVS: S1S2(+)  ABD: bowel sounds (+), soft, nontender, non distended, no rebound, no guarding, no rigidity, no pelvic instability to exam  EXT: left upper ext in splint per ortho, no calf tenderness or edema to exam b/l, pt has good capillary refill in all digits. Sensoromotor function grossly intact to limited exam, on VTE prophylaxis  Skin: no adverse skin changes to exam otherwise

## 2024-10-14 ENCOUNTER — TRANSCRIPTION ENCOUNTER (OUTPATIENT)
Age: 79
End: 2024-10-14

## 2024-10-14 VITALS — TEMPERATURE: 98 F

## 2024-10-14 LAB
ANION GAP SERPL CALC-SCNC: 6 MMOL/L — SIGNIFICANT CHANGE UP (ref 5–17)
BASOPHILS # BLD AUTO: 0.01 K/UL — SIGNIFICANT CHANGE UP (ref 0–0.2)
BASOPHILS NFR BLD AUTO: 0.3 % — SIGNIFICANT CHANGE UP (ref 0–2)
BUN SERPL-MCNC: 29 MG/DL — HIGH (ref 7–23)
CALCIUM SERPL-MCNC: 8.9 MG/DL — SIGNIFICANT CHANGE UP (ref 8.5–10.1)
CHLORIDE SERPL-SCNC: 111 MMOL/L — HIGH (ref 96–108)
CO2 SERPL-SCNC: 25 MMOL/L — SIGNIFICANT CHANGE UP (ref 22–31)
CREAT SERPL-MCNC: 1.24 MG/DL — SIGNIFICANT CHANGE UP (ref 0.5–1.3)
EGFR: 58 ML/MIN/1.73M2 — LOW
EOSINOPHIL # BLD AUTO: 0.11 K/UL — SIGNIFICANT CHANGE UP (ref 0–0.5)
EOSINOPHIL NFR BLD AUTO: 3.3 % — SIGNIFICANT CHANGE UP (ref 0–6)
GLUCOSE SERPL-MCNC: 97 MG/DL — SIGNIFICANT CHANGE UP (ref 70–99)
HCT VFR BLD CALC: 43.4 % — SIGNIFICANT CHANGE UP (ref 39–50)
HGB BLD-MCNC: 14.3 G/DL — SIGNIFICANT CHANGE UP (ref 13–17)
IMM GRANULOCYTES NFR BLD AUTO: 0.3 % — SIGNIFICANT CHANGE UP (ref 0–0.9)
LYMPHOCYTES # BLD AUTO: 1.16 K/UL — SIGNIFICANT CHANGE UP (ref 1–3.3)
LYMPHOCYTES # BLD AUTO: 34.4 % — SIGNIFICANT CHANGE UP (ref 13–44)
MAGNESIUM SERPL-MCNC: 2 MG/DL — SIGNIFICANT CHANGE UP (ref 1.6–2.6)
MCHC RBC-ENTMCNC: 30 PG — SIGNIFICANT CHANGE UP (ref 27–34)
MCHC RBC-ENTMCNC: 32.9 GM/DL — SIGNIFICANT CHANGE UP (ref 32–36)
MCV RBC AUTO: 91.2 FL — SIGNIFICANT CHANGE UP (ref 80–100)
MONOCYTES # BLD AUTO: 0.62 K/UL — SIGNIFICANT CHANGE UP (ref 0–0.9)
MONOCYTES NFR BLD AUTO: 18.4 % — HIGH (ref 2–14)
NEUTROPHILS # BLD AUTO: 1.46 K/UL — LOW (ref 1.8–7.4)
NEUTROPHILS NFR BLD AUTO: 43.3 % — SIGNIFICANT CHANGE UP (ref 43–77)
PHOSPHATE SERPL-MCNC: 2.9 MG/DL — SIGNIFICANT CHANGE UP (ref 2.5–4.5)
PLATELET # BLD AUTO: 98 K/UL — LOW (ref 150–400)
POTASSIUM SERPL-MCNC: 4.4 MMOL/L — SIGNIFICANT CHANGE UP (ref 3.5–5.3)
POTASSIUM SERPL-SCNC: 4.4 MMOL/L — SIGNIFICANT CHANGE UP (ref 3.5–5.3)
RBC # BLD: 4.76 M/UL — SIGNIFICANT CHANGE UP (ref 4.2–5.8)
RBC # FLD: 14.4 % — SIGNIFICANT CHANGE UP (ref 10.3–14.5)
SODIUM SERPL-SCNC: 142 MMOL/L — SIGNIFICANT CHANGE UP (ref 135–145)
WBC # BLD: 3.37 K/UL — LOW (ref 3.8–10.5)
WBC # FLD AUTO: 3.37 K/UL — LOW (ref 3.8–10.5)

## 2024-10-14 PROCEDURE — 99232 SBSQ HOSP IP/OBS MODERATE 35: CPT

## 2024-10-14 RX ORDER — OXYCODONE AND ACETAMINOPHEN 5; 325 MG/1; MG/1
1 TABLET ORAL
Qty: 20 | Refills: 0
Start: 2024-10-14 | End: 2024-10-18

## 2024-10-14 RX ORDER — ACETAMINOPHEN 325 MG
1000 TABLET ORAL ONCE
Refills: 0 | Status: COMPLETED | OUTPATIENT
Start: 2024-10-14 | End: 2024-10-14

## 2024-10-14 RX ADMIN — Medication 400 MILLIGRAM(S): at 02:42

## 2024-10-14 RX ADMIN — BACITRACIN 1 APPLICATION(S): 500 OINTMENT TOPICAL at 09:47

## 2024-10-14 RX ADMIN — Medication 50 MILLIGRAM(S): at 09:45

## 2024-10-14 RX ADMIN — FUROSEMIDE 20 MILLIGRAM(S): 10 INJECTION INTRAVENOUS at 09:45

## 2024-10-14 RX ADMIN — Medication 1000 MILLIGRAM(S): at 03:00

## 2024-10-14 RX ADMIN — Medication 1 TABLET(S): at 09:44

## 2024-10-14 NOTE — PROGRESS NOTE ADULT - NUTRITIONAL ASSESSMENT
This patient has been assessed with a concern for Malnutrition and has been determined to have a diagnosis/diagnoses of Moderate protein-calorie malnutrition.    This patient is being managed with:   Diet Regular-  Entered: Oct 13 2024 12:43AM

## 2024-10-14 NOTE — DISCHARGE NOTE NURSING/CASE MANAGEMENT/SOCIAL WORK - NSDCFUADDAPPT_GEN_ALL_CORE_FT
Please follow up with Orthopedic office to schedule follow up appointment.   -Please see Dr Zambrano tomorrow to discuss OR planning and resuming Eliquis.    Please follow up with cardiologist office to schedule follow up appointment.   -Cardiology: Eval asymptomatic rate controlled Afib with HR 50-60's for medication mgmt adjustments, resuming AC therapy, and OR clearance.     Please follow up with Opthalmology & OMFS office to schedule follow up appointment.   Opthalmology & OMFS: Outpt follow up with [facility] surgical team. No established group at [facility].   -[facility] at [place]  [phone]  -Please COMPLETE x7 days of Augmentin ABX Thear[place]     Please follow up with PCP office to schedule follow up appointment.   Primary care provider: Follow up incidental CT imaging findings.

## 2024-10-14 NOTE — DISCHARGE NOTE NURSING/CASE MANAGEMENT/SOCIAL WORK - PATIENT PORTAL LINK FT
You can access the FollowMyHealth Patient Portal offered by [facility] by registering at the following website: [email or web address]. By joining [employer]’s FollowMyHealth portal, you will also be able to view your health information using other applications (apps) compatible with our system.

## 2024-10-14 NOTE — DISCHARGE NOTE PROVIDER - PROVIDER TOKENS
PROVIDER:[TOKEN:[[id number]],FOLLOWUP:[1-3 days]],PROVIDER:[TOKEN:[[id number]],FOLLOWUP:[1 week]]

## 2024-10-14 NOTE — PROGRESS NOTE ADULT - ASSESSMENT
A/P:  Left radius fracture, in splint per ortho  Plan per ortho for repair  Right frontal fracture to orbital region  GI/DVT prophylaxis  Pain control  F/U labs  Cardiology consult for evaluation/clearance for pending ortho repair  Pt aware of and agrees with all of the above    35 minutes of time spent on pt examination, review of relevant labs and radiologic studies, assured stabilization of pt, discussion with relevant services/providers for coordination of pt care and services, time is exclusive of resident and/or physician assistant teaching or supervision time
79-year-old male with a past medical history of Afib on Eliquis, RA, abdominal aortic aneurysm, prior falls (June 2024 with small falx SDH) who presents to  ER s/p mechanical fall on 10/12/24.  Patient states he was walking and tripped on the curb, hitting his face.  No LOC. GCS 15. Complaining of pain to the face and the forehead, thoracic back, right shoulder and L wrist. HCT revealed acute nondisplaced fracture Right frontal bone that extends right frontal sinus and orbital roof. Neurosurgery consulted: No acute surgical intervention. OMFS and ophthalmology services contacted by ED, no surgical intervention needed. Outpt followup. Orthopedic surgery consulted (Dr. Zambrano) for Lt distal radius fracture, closed. Reduced in ED and placed in LUE splint. Rec to HOLD ELIQUIS until surgical intervention this week. Cleared for d/c home with Ortho outpt follow tomorrow. Hospital course noted for: Asymptomatic rate controlled Afib with HR 50-60's, [id number]. Rec cardiology f/u for evaluation and OR clearance. Pt currently in NAD and without acute complaints. Denies N/V/D HA, dizziness, blurry vision, numbness/tingling, SOB, cough, chest pain, palpitations, abd pain or urinary sx's. Pt seen and evaluated by surgical attending in AM and case/plan discussed in detail and deemed clear for d/c home today.     Follow up appt:  -Cardiology: Eval asymptomatic rate controlled Afib with HR 50-60's for medication mgmt adjustments, resuming AC therapy, and OR clearance.   -Ortho: HOLD Eliquis and follow up with Dr Zambrano tomorrow for OR plan.   -Opthalmology & OMFS: Outpt follow up with [facility] surgical team. No established group at [facility].   -Primary care provider: Follow up incidental CT imaging findings       A/P:  Left radius fracture, in splint per ortho  -Plan per ortho for repair this week with Dr Zambrano  -TRINITY on HOLD for surgical intervention  Right frontal fracture to orbital region  -Optho & OMFS contacted. No acute surgical intervention Outpt follow up   -Augmentin x7d therapy   GI/DVT prophylaxis  Pain control  Cardiology consult for evaluation/clearance for pending ortho repair.  -Pt refusing to wait and be evaluated by in-hours cardiology at this time (requesting to be discharged home). Risk related to leaving prior to cardiology eval for asymptomatic Afib with HR 50s for medication adjustments, resuming AC therapy guidance, and OR clearance discussed in detail/all questions answer by bedside surgical attending.     Case and plan discussed with surgical attending.   
Patient is an 79-year-old male with a past medical history of Afib on Eliquis, known to our service s/p fall June 2024 with small falx SDH, who presents to  ER s/p mechanical fall this evening. HCT revealed acute nondisplaced fracture Right frontal bone that extends Right frontal sinus and orbital roof. No intracranial hemorrhage.    Plan:  - No acute neurosurgical intervention indicated  - Pain control  - follow-up with OMFS  - Ortho following for L distal radius fx.   - Hold Eliquis per Ortho   - will sign-off and can be reconsulted as needed       D/w Dr. Jiang

## 2024-10-14 NOTE — PROGRESS NOTE ADULT - NS ATTEND AMEND GEN_ALL_CORE FT
I have seen and examined the patient  I agree with the assessment and plans as follows    An 79 year old male with A fib on Eliquis. sustained a left distal radius fracture and skull fracture after a fall. Managed conservatively.   Patient counselled about surgery for ORIF of the left radius fracture, would like to follow up with the orthopedic surgeon as an outpatient    On examination:  GCS of 15  Pt is aaox3  Pt in no acute distress  Airway is patent  Breathing is symmetric and unlabored  Neuro: CN II-XII grossly intact  Psych: normal affect  HEENT: normocephalic, PELON, EOM wnl, known right frontal fracture. + right facial and periorbital ecchymosis and abrasions  Neck: No tracheal deviation, no JVD, no crepitus, no ecchymosis, no hematoma  Chest: No gross rib or sternal pathology or tenderness to exam, no crepitus, no ecchymosis, no hematoma  Resp: CTAB  CVS: Irregularly irregular  ABD: bowel sounds (+), soft, nontender, non distended, no rebound, no guarding, no rigidity, no pelvic instability to exam  EXT: left upper ext in splint per ortho, no calf tenderness or edema to exam b/l, pt has good capillary refill in all digits. Sensoromotor function grossly intact to limited exam, on VTE prophylaxis  Skin: no adverse skin changes to exam otherwise    Plan:  Will work on discharging the patient, arrange for follow up with Dr Zambrano, possibly today  Patient advised to keep holding his Eliquis until further advise during his orthopedic appointment  Follow up with neurosurgery  Follow up as an outpatient with OMFS and ophthalmology  Continue antibiotics for a total of 7 days  Ambulation  Incentive spirometry  Analgesia

## 2024-10-14 NOTE — PROGRESS NOTE ADULT - SUBJECTIVE AND OBJECTIVE BOX
CC:Patient is a 79y old  Male who presents with a chief complaint of s/p fall with frontal bone/left radial fx (14 Oct 2024 09:10)    Overnight: No acute events     Subjective:  Pt seen and examined at bedside with surgical attending. Pt is AAOx3, in no acute distress. Requesting to be d/c home today and refusing to be seen by in-house cardiology team for OR clearance and eval of asymptomatic Afib with intermit HR 50's, 's. Pt denied c/o fever, chills, chest pain, SOB, abd pain, N/V/D, extremity pain or dysfunction, hemoptysis, hematemesis, hematuria, hematochezia, headache, diplopia, vertigo, dizziness.    ROS:  otherwise as abovementioned ROS    Vital Signs Last 24 Hrs  T(C): 36.7 (14 Oct 2024 08:56), Max: 36.8 (13 Oct 2024 12:00)  T(F): 98 (14 Oct 2024 08:56), Max: 98.2 (13 Oct 2024 12:00)  HR: 68 (14 Oct 2024 08:00) (51 - 81)  BP: 138/94 (14 Oct 2024 08:00) (100/48 - 138/94)  BP(mean): 102 (14 Oct 2024 08:00) (64 - 102)  RR: 17 (14 Oct 2024 08:00) (13 - 26)  SpO2: 92% (14 Oct 2024 08:00) (91% - 100%)    Parameters below as of 14 Oct 2024 08:00  Patient On (Oxygen Delivery Method): room air        Labs:                        14.3   3.37  )-----------( 98       ( 14 Oct 2024 06:14 )             43.4     CBC Full  -  ( 14 Oct 2024 06:14 )  WBC Count : 3.37 K/uL  RBC Count : 4.76 M/uL  Hemoglobin : 14.3 g/dL  Hematocrit : 43.4 %  Platelet Count - Automated : 98 K/uL  Mean Cell Volume : 91.2 fl  Mean Cell Hemoglobin : 30.0 pg  Mean Cell Hemoglobin Concentration : 32.9 gm/dL  Auto Neutrophil # : 1.46 K/uL  Auto Lymphocyte # : 1.16 K/uL  Auto Monocyte # : 0.62 K/uL  Auto Eosinophil # : 0.11 K/uL  Auto Basophil # : 0.01 K/uL  Auto Neutrophil % : 43.3 %  Auto Lymphocyte % : 34.4 %  Auto Monocyte % : 18.4 %  Auto Eosinophil % : 3.3 %  Auto Basophil % : 0.3 %    10-14    142  |  111[H]  |  29[H]  ----------------------------<  97  4.4   |  25  |  1.24    Ca    8.9      14 Oct 2024 06:14  Phos  2.9     10-14  Mg     2.0     10-14    TPro  6.9  /  Alb  2.9[L]  /  TBili  0.5  /  DBili  x   /  AST  34  /  ALT  28  /  AlkPhos  78  10-12    LIVER FUNCTIONS - ( 12 Oct 2024 21:11 )  Alb: 2.9 g/dL / Pro: 6.9 gm/dL / ALK PHOS: 78 U/L / ALT: 28 U/L / AST: 34 U/L / GGT: x           PT/INR - ( 12 Oct 2024 21:11 )   PT: 12.1 sec;   INR: 1.05 ratio         PTT - ( 12 Oct 2024 21:11 )  PTT:31.2 sec      Meds:  acetaminophen     Tablet .. 650 milliGRAM(s) Oral every 6 hours PRN  amoxicillin  875 milliGRAM(s)/clavulanate 1 Tablet(s) Oral two times a day  bacitracin   Ointment 1 Application(s) Topical daily  chlorhexidine 4% Liquid 1 Application(s) Topical <User Schedule>  furosemide    Tablet 20 milliGRAM(s) Oral daily  lidocaine   4% Patch 1 Patch Transdermal every 24 hours  metoprolol succinate ER 50 milliGRAM(s) Oral daily  morphine  - Injectable 4 milliGRAM(s) IV Push every 4 hours PRN  ondansetron Injectable 4 milliGRAM(s) IV Push every 6 hours PRN  oxyCODONE    IR 5 milliGRAM(s) Oral every 6 hours PRN  pantoprazole    Tablet 40 milliGRAM(s) Oral before breakfast  rosuvastatin 20 milliGRAM(s) Oral at bedtime  sodium chloride 0.9%. 1000 milliLiter(s) IV Continuous <Continuous>          Physical exam:  GCS of 15  Pt is aaox3  Pt in no acute distress  Airway is patent  Breathing is symmetric and unlabored  Neuro: CN II-XII grossly intact  Psych: normal affect  HEENT: normocephalic, PELON, EOM wnl, known right frontal fracture. + right facial and periorbital ecchymosis and abrasions  Neck: No tracheal deviation, no JVD, no crepitus, no ecchymosis, no hematoma  Chest: No gross rib or sternal pathology or tenderness to exam, no crepitus, no ecchymosis, no hematoma  Resp: CTAB  CVS: S1S2(+)  ABD: bowel sounds (+), soft, nontender, non distended, no rebound, no guarding, no rigidity, no pelvic instability to exam  EXT: left upper ext in splint per ortho, no calf tenderness or edema to exam b/l, pt has good capillary refill in all digits. Sensoromotor function grossly intact to limited exam, on VTE prophylaxis  Skin: no adverse skin changes to exam otherwise       CC: Patient is a 79y old  Male who presents with a chief complaint of s/p fall with frontal bone/left radial fx (14 Oct 2024 09:10)    Overnight: No acute events     Subjective:  Pt seen and examined at bedside with surgical attending. Pt is AAOx3, in no acute distress. Requesting to be d/c home today and refusing to be seen by in-house cardiology team for OR clearance and eval of asymptomatic Afib with intermit HR 50's, 's. Pt denied c/o fever, chills, chest pain, SOB, abd pain, N/V/D, extremity pain or dysfunction, hemoptysis, hematemesis, hematuria, hematochezia, headache, diplopia, vertigo, dizziness.    ROS:  otherwise as abovementioned ROS    Vital Signs Last 24 Hrs  T(C): 36.7 (14 Oct 2024 08:56), Max: 36.8 (13 Oct 2024 12:00)  T(F): 98 (14 Oct 2024 08:56), Max: 98.2 (13 Oct 2024 12:00)  HR: 68 (14 Oct 2024 08:00) (51 - 81)  BP: 138/94 (14 Oct 2024 08:00) (100/48 - 138/94)  BP(mean): 102 (14 Oct 2024 08:00) (64 - 102)  RR: 17 (14 Oct 2024 08:00) (13 - 26)  SpO2: 92% (14 Oct 2024 08:00) (91% - 100%)    Parameters below as of 14 Oct 2024 08:00  Patient On (Oxygen Delivery Method): room air        Labs:                        14.3   3.37  )-----------( 98       ( 14 Oct 2024 06:14 )             43.4     CBC Full  -  ( 14 Oct 2024 06:14 )  WBC Count : 3.37 K/uL  RBC Count : 4.76 M/uL  Hemoglobin : 14.3 g/dL  Hematocrit : 43.4 %  Platelet Count - Automated : 98 K/uL  Mean Cell Volume : 91.2 fl  Mean Cell Hemoglobin : 30.0 pg  Mean Cell Hemoglobin Concentration : 32.9 gm/dL  Auto Neutrophil # : 1.46 K/uL  Auto Lymphocyte # : 1.16 K/uL  Auto Monocyte # : 0.62 K/uL  Auto Eosinophil # : 0.11 K/uL  Auto Basophil # : 0.01 K/uL  Auto Neutrophil % : 43.3 %  Auto Lymphocyte % : 34.4 %  Auto Monocyte % : 18.4 %  Auto Eosinophil % : 3.3 %  Auto Basophil % : 0.3 %    10-14    142  |  111[H]  |  29[H]  ----------------------------<  97  4.4   |  25  |  1.24    Ca    8.9      14 Oct 2024 06:14  Phos  2.9     10-14  Mg     2.0     10-14    TPro  6.9  /  Alb  2.9[L]  /  TBili  0.5  /  DBili  x   /  AST  34  /  ALT  28  /  AlkPhos  78  10-12    LIVER FUNCTIONS - ( 12 Oct 2024 21:11 )  Alb: 2.9 g/dL / Pro: 6.9 gm/dL / ALK PHOS: 78 U/L / ALT: 28 U/L / AST: 34 U/L / GGT: x           PT/INR - ( 12 Oct 2024 21:11 )   PT: 12.1 sec;   INR: 1.05 ratio         PTT - ( 12 Oct 2024 21:11 )  PTT:31.2 sec      Meds:  acetaminophen     Tablet .. 650 milliGRAM(s) Oral every 6 hours PRN  amoxicillin  875 milliGRAM(s)/clavulanate 1 Tablet(s) Oral two times a day  bacitracin   Ointment 1 Application(s) Topical daily  chlorhexidine 4% Liquid 1 Application(s) Topical <User Schedule>  furosemide    Tablet 20 milliGRAM(s) Oral daily  lidocaine   4% Patch 1 Patch Transdermal every 24 hours  metoprolol succinate ER 50 milliGRAM(s) Oral daily  morphine  - Injectable 4 milliGRAM(s) IV Push every 4 hours PRN  ondansetron Injectable 4 milliGRAM(s) IV Push every 6 hours PRN  oxyCODONE    IR 5 milliGRAM(s) Oral every 6 hours PRN  pantoprazole    Tablet 40 milliGRAM(s) Oral before breakfast  rosuvastatin 20 milliGRAM(s) Oral at bedtime  sodium chloride 0.9%. 1000 milliLiter(s) IV Continuous <Continuous>          Physical exam:  GCS of 15  Pt is aaox3  Pt in no acute distress  Airway is patent  Breathing is symmetric and unlabored  Neuro: CN II-XII grossly intact  Psych: normal affect  HEENT: normocephalic, PELON, EOM wnl, known right frontal fracture. + right facial and periorbital ecchymosis and abrasions  Neck: No tracheal deviation, no JVD, no crepitus, no ecchymosis, no hematoma  Chest: No gross rib or sternal pathology or tenderness to exam, no crepitus, no ecchymosis, no hematoma  Resp: CTAB  CVS: S1S2(+)  ABD: bowel sounds (+), soft, nontender, non distended, no rebound, no guarding, no rigidity, no pelvic instability to exam  EXT: left upper ext in splint per ortho, no calf tenderness or edema to exam b/l, pt has good capillary refill in all digits. Sensoromotor function grossly intact to limited exam, on VTE prophylaxis  Skin: no adverse skin changes to exam otherwise

## 2024-10-14 NOTE — DISCHARGE NOTE NURSING/CASE MANAGEMENT/SOCIAL WORK - NSDCVIVACCINE_GEN_ALL_CORE_FT
Tdap; 12-Jun-2024 00:08; Gabrielle Roque (COLLIN); [employer]; [id number] (Exp. Date: 30-Nov-2025); IntraMuscular; Deltoid Left.; 0.5 milliLiter(s); VIS (VIS Published: 09-May-2013, VIS Presented: 12-Jun-2024);

## 2024-10-14 NOTE — DISCHARGE NOTE PROVIDER - HOSPITAL COURSE
79-year-old male with a past medical history of Afib on Eliquis, RA, abdominal aortic aneurysm, prior falls (June 2024 with small falx SDH) who presents to  ER s/p mechanical fall on 10/12/24.  Patient states he was walking and tripped on the curb, hitting his face.  No LOC. GCS 15. Complaining of pain to the face and the forehead, thoracic back, right shoulder and L wrist. HCT revealed acute nondisplaced fracture Right frontal bone that extends right frontal sinus and orbital roof. Neurosurgery consulted: No acute surgical intervention. OMFS and ophthalmology services contacted by ED, no surgical intervention needed. Outpt followup. Orthopedic surgery consulted (Dr. Zambrano) for Lt distal radius fracture, closed. Reduced in ED and placed in LUE splint. Rec to HOLD ELIQUIS until surgical intervention this week. Cleared for d/c home with Ortho outpt follow tomorrow. Hospital course noted for: Asymptomatic rate controlled Afib with HR 50-60's, [id number]. Rec cardiology f/u for evaluation and OR clearance. Pt currently in NAD and without acute complaints. Denies N/V/D HA, dizziness, blurry vision, numbness/tingling, SOB, cough, chest pain, palpitations, abd pain or urinary sx's. Pt seen and evaluated by surgical attending in AM and case/plan discussed in detail and deemed clear for d/c home today.     Follow up appt:  Cardiology: Eval asymptomatic rate controlled Afib with HR 50-60's for medication mgmt adjustments, resuming AC therapy, and OR clearance.   Ortho: HOLD Eliquis and follow up with Dr Zambrano tomorrow for OR plan.   Opthalmology & OMFS: Outpt follow up with [facility] surgical team. No established group at [facility].   Primary care provider: Follow up incidental CT imaging findings (provided below)                          14.3   3.37  )-----------( 98       ( 14 Oct 2024 06:14 )             43.4     10-14    142  |  111[H]  |  29[H]  ----------------------------<  97  4.4   |  25  |  1.24    Ca    8.9      14 Oct 2024 06:14  Phos  2.9     10-14  Mg     2.0     10-14    TPro  6.9  /  Alb  2.9[L]  /  TBili  0.5  /  DBili  x   /  AST  34  /  ALT  28  /  AlkPhos  78  10-12        Incidental CT imaging findings:  FINDINGS:  CHEST:  LUNGS AND LARGE AIRWAYS: Patent central airways. No pulmonary nodules,   masses or consolidation. Redemonstrated interstitial reticular thickening   with a predominant peripheral and basilar distribution suggestive of an   interstitial lung disease.  PLEURA: No pleural effusion.  VESSELS: Redemonstrated ascending aorta to 4.8 cm.  HEART: Heart size is enlarged. No pericardial effusion. Coronary artery   calcifications.  MEDIASTINUM AND JOHNATHON: Enlarged AP window lymph node measuring up to 1.8 x   1.7 cm. Enlarged right lower paratracheal lymph node measuring up to 1.5   x 1.0 cm.  CHEST WALL AND LOWER NECK: Old posterior right 9th, 11th and 12th rib   fracture deformities. Old anterolateral left 5th through 10th and   posterior 9th through 12th rib fractures. No acute rib fracture.    ABDOMEN AND PELVIS:  LIVER: Hepatic cysts. Subcentimeter hypodense lesions too small for   accurate characterization.  BILE DUCTS: Normal caliber.  GALLBLADDER: Within normal limits.  SPLEEN: Scattered parenchymal calcifications likely due to prior   granulomatous disease.  PANCREAS: Within normal limits.  ADRENALS: Within normal limits.  KIDNEYS/URETERS: Bilateral renal cysts. Nonobstructing bilateral renal   calculi measuring up to 4 mm on the right and 2 mm on the left. No   hydronephrosis. Nonspecific bilateral perinephric fat stranding.    BLADDER: Minimally distended. Mild circumferential wall thickening.  REPRODUCTIVE ORGANS: Enlarged prostate to 6.6 cm.    BOWEL: No bowel obstruction or inflammation. Colon diverticulosis without   diverticulitis. 5 mm appendicolith versus retained oral contrast in the   distal appendix. Appendix otherwise unremarkable.  PERITONEUM/RETROPERITONEUM: Within normal limits.  VESSELS: Infrarenal abdominal aortic aneurysm measuring up to 4.3 cm with   a focally displaced intimal calcification likely a chronic dissection,   not significantly changed.  LYMPH NODES: No lymphadenopathy.  ABDOMINAL WALL: Small fat-containing left inguinal hernia.  BONES: Degenerative changes of the spine.    IMPRESSION:  1. No evidence of acute intrathoracic, intra-abdominal or intrapelvic   trauma.  2. Mild circumferential urinary bladder wall thickening. Correlate with   urinalysis for evidence of a cystitis.

## 2024-10-14 NOTE — PROGRESS NOTE ADULT - REASON FOR ADMISSION
s/p fall with frontal bone/left radial fx

## 2024-10-14 NOTE — DISCHARGE NOTE PROVIDER - NSDCFUADDAPPT_GEN_ALL_CORE_FT
Please follow up with Orthopedic office to schedule follow up appointment.   -Please see Dr Zambrano tomorrow to discuss OR planning and resuming Eliquis.    Please follow up with cardiologist office to schedule follow up appointment.   -Cardiology: Eval asymptomatic rate controlled Afib with HR 50-60's for medication mgmt adjustments, resuming AC therapy, and OR clearance.     Please follow up with Opthalmology & OMFS office to schedule follow up appointment.   Opthalmology & OMFS: Outpt follow up with [facility] surgical team. No established group at [facility].   -[facility] at [place]  [phone]  -Please COMPLETE x7 days of Augmentin ABX Thearpy     Please follow up with PCP office to schedule follow up appointment.   Primary care provider: Follow up incidental CT imaging findings.

## 2024-10-14 NOTE — DISCHARGE NOTE NURSING/CASE MANAGEMENT/SOCIAL WORK - NSDCPEFALRISK_GEN_ALL_CORE
For information on Fall & Injury Prevention, visit: [email or web address] OR  [email or web address] OR  https://www.cdc.gov/steadi/patient.html

## 2024-10-14 NOTE — DISCHARGE NOTE PROVIDER - NSDCFUSCHEDAPPT_GEN_ALL_CORE_FT
Clarke Reyes  [facility]  PULMMED [address]  Scheduled Appointment: 11/05/2024    Theodore Judd  [facility]  CARDIOLOGY [address]  Scheduled Appointment: 12/19/2024

## 2024-10-14 NOTE — DISCHARGE NOTE PROVIDER - NSDCCPCAREPLAN_GEN_ALL_CORE_FT
PRINCIPAL DISCHARGE DIAGNOSIS  Diagnosis: Fracture of frontal bone  Assessment and Plan of Treatment:       SECONDARY DISCHARGE DIAGNOSES  Diagnosis: Frontal sinus fracture  Assessment and Plan of Treatment:     Diagnosis: Fracture of orbital roof, right side, initial encounter for closed fracture  Assessment and Plan of Treatment:     Diagnosis: Left wrist fracture  Assessment and Plan of Treatment:

## 2024-10-14 NOTE — DISCHARGE NOTE PROVIDER - DETAILS OF MALNUTRITION DIAGNOSIS/DIAGNOSES
This patient has been assessed with a concern for Malnutrition and was treated during this hospitalization for the following Nutrition diagnosis/diagnoses:     -  10/13/2024: Moderate protein-calorie malnutrition

## 2024-10-14 NOTE — DISCHARGE NOTE PROVIDER - CARE PROVIDER_API CALL
Peterson Zambrano  Orthopaedic Surgery  [address] [place] [address]  Phone: [phone]  Fax: [phone]  Follow Up Time: 1-3 days    Gil Mcdaniel  Ophthalmology  [address]  [place] [address]  Phone: [phone]  Fax: [phone]  Follow Up Time: 1 week

## 2024-10-14 NOTE — DISCHARGE NOTE PROVIDER - NSDCMRMEDTOKEN_GEN_ALL_CORE_FT
Caltrate 600 mg oral tablet: 1 tab(s) orally once a day  cholecalciferol 25 mcg (1000 intl units) oral tablet: 2 tab(s) orally once a day  Enbrel SureClick 50 mg/mL subcutaneous solution: 50 milligram(s) subcutaneously once a week  furosemide 20 mg oral tablet: 1 tab(s) orally once a day  hydroxychloroquine 200 mg oral tablet: 1 tab(s) orally 2 times a day  metoprolol succinate 25 mg oral tablet, extended release: 2 tab(s) orally once a day  Potassium Chloride (Eqv-Klor-Con M10) 10 mEq oral tablet, extended release: 1 tab(s) orally once a day  predniSONE 1 mg oral tablet: 2 tab(s) orally once a day  rosuvastatin 20 mg oral tablet: 1 tab(s) orally once a day (at bedtime)   amoxicillin-clavulanate 875 mg-125 mg oral tablet: 1 tab(s) orally 2 times a day MDD: 2 tabs  Caltrate 600 mg oral tablet: 1 tab(s) orally once a day  cholecalciferol 25 mcg (1000 intl units) oral tablet: 2 tab(s) orally once a day  Enbrel SureClick 50 mg/mL subcutaneous solution: 50 milligram(s) subcutaneously once a week  furosemide 20 mg oral tablet: 1 tab(s) orally once a day  hydroxychloroquine 200 mg oral tablet: 1 tab(s) orally 2 times a day  metoprolol succinate 25 mg oral tablet, extended release: 2 tab(s) orally once a day  oxycodone-acetaminophen 5 mg-325 mg oral tablet: 1 tab(s) orally every 6 hours as needed for  severe pain MDD: 4 tabs  Potassium Chloride (Eqv-Klor-Con M10) 10 mEq oral tablet, extended release: 1 tab(s) orally once a day  predniSONE 1 mg oral tablet: 2 tab(s) orally once a day  rosuvastatin 20 mg oral tablet: 1 tab(s) orally once a day (at bedtime)

## 2024-10-15 ENCOUNTER — APPOINTMENT (OUTPATIENT)
Dept: CARDIOLOGY | Facility: CLINIC | Age: 79
End: 2024-10-15
Payer: MEDICARE

## 2024-10-15 VITALS
BODY MASS INDEX: 26.28 KG/M2 | HEIGHT: 72 IN | SYSTOLIC BLOOD PRESSURE: 130 MMHG | DIASTOLIC BLOOD PRESSURE: 82 MMHG | WEIGHT: 194 LBS | RESPIRATION RATE: 16 BRPM | HEART RATE: 72 BPM

## 2024-10-15 DIAGNOSIS — I48.91 UNSPECIFIED ATRIAL FIBRILLATION: ICD-10-CM

## 2024-10-15 DIAGNOSIS — I77.810 THORACIC AORTIC ECTASIA: ICD-10-CM

## 2024-10-15 DIAGNOSIS — E78.00 PURE HYPERCHOLESTEROLEMIA, UNSPECIFIED: ICD-10-CM

## 2024-10-15 DIAGNOSIS — I10 ESSENTIAL (PRIMARY) HYPERTENSION: ICD-10-CM

## 2024-10-15 PROCEDURE — G2211 COMPLEX E/M VISIT ADD ON: CPT

## 2024-10-15 PROCEDURE — 93000 ELECTROCARDIOGRAM COMPLETE: CPT

## 2024-10-15 PROCEDURE — 99214 OFFICE O/P EST MOD 30 MIN: CPT

## 2024-10-15 RX ORDER — ENOXAPARIN SODIUM 100 MG/ML
100 INJECTION, SOLUTION SUBCUTANEOUS TWICE DAILY
Qty: 4 | Refills: 0 | Status: ACTIVE | COMMUNITY
Start: 1900-01-01 | End: 1900-01-01

## 2024-10-23 DIAGNOSIS — R29.6 REPEATED FALLS: ICD-10-CM

## 2024-10-23 DIAGNOSIS — S02.19XA OTHER FRACTURE OF BASE OF SKULL, INITIAL ENCOUNTER FOR CLOSED FRACTURE: ICD-10-CM

## 2024-10-23 DIAGNOSIS — S52.502A UNSPECIFIED FRACTURE OF THE LOWER END OF LEFT RADIUS, INITIAL ENCOUNTER FOR CLOSED FRACTURE: ICD-10-CM

## 2024-10-23 DIAGNOSIS — S02.121A FRACTURE OF ORBITAL ROOF, RIGHT SIDE, INITIAL ENCOUNTER FOR CLOSED FRACTURE: ICD-10-CM

## 2024-10-23 DIAGNOSIS — M06.9 RHEUMATOID ARTHRITIS, UNSPECIFIED: ICD-10-CM
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
## 2024-10-23 DIAGNOSIS — S02.0XXA FRACTURE OF VAULT OF SKULL, INITIAL ENCOUNTER FOR CLOSED FRACTURE: ICD-10-CM

## 2024-10-23 DIAGNOSIS — E44.0 MODERATE PROTEIN-CALORIE MALNUTRITION: ICD-10-CM

## 2024-10-23 DIAGNOSIS — I48.20 CHRONIC ATRIAL FIBRILLATION, UNSPECIFIED: ICD-10-CM

## 2024-10-23 DIAGNOSIS — Z79.01 LONG TERM (CURRENT) USE OF ANTICOAGULANTS: ICD-10-CM
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
## 2024-10-23 DIAGNOSIS — I71.40 ABDOMINAL AORTIC ANEURYSM, WITHOUT RUPTURE, UNSPECIFIED: ICD-10-CM

## 2024-11-07 ENCOUNTER — APPOINTMENT (OUTPATIENT)
Dept: PULMONOLOGY | Facility: CLINIC | Age: 79
End: 2024-11-07
Payer: MEDICARE

## 2024-11-07 VITALS
BODY MASS INDEX: 27.92 KG/M2 | HEIGHT: 70 IN | SYSTOLIC BLOOD PRESSURE: 132 MMHG | WEIGHT: 195 LBS | DIASTOLIC BLOOD PRESSURE: 84 MMHG | RESPIRATION RATE: 16 BRPM

## 2024-11-07 VITALS — OXYGEN SATURATION: 97 %

## 2024-11-07 DIAGNOSIS — F17.219 NICOTINE DEPENDENCE, CIGARETTES, WITH UNSPECIFIED NICOTINE-INDUCED DISORDERS: ICD-10-CM

## 2024-11-07 DIAGNOSIS — J84.9 INTERSTITIAL PULMONARY DISEASE, UNSPECIFIED: ICD-10-CM

## 2024-11-07 PROCEDURE — 99214 OFFICE O/P EST MOD 30 MIN: CPT

## 2024-11-07 PROCEDURE — G2211 COMPLEX E/M VISIT ADD ON: CPT

## 2024-11-08 ENCOUNTER — RX RENEWAL (OUTPATIENT)
Age: 79
End: 2024-11-08

## 2024-11-20 ENCOUNTER — NON-APPOINTMENT (OUTPATIENT)
Age: 79
End: 2024-11-20

## 2024-11-20 ENCOUNTER — APPOINTMENT (OUTPATIENT)
Dept: CARDIOLOGY | Facility: CLINIC | Age: 79
End: 2024-11-20
Payer: MEDICARE

## 2024-11-20 VITALS
SYSTOLIC BLOOD PRESSURE: 120 MMHG | WEIGHT: 195 LBS | HEIGHT: 70 IN | HEART RATE: 56 BPM | DIASTOLIC BLOOD PRESSURE: 70 MMHG | BODY MASS INDEX: 27.92 KG/M2 | RESPIRATION RATE: 16 BRPM

## 2024-11-20 DIAGNOSIS — I77.810 THORACIC AORTIC ECTASIA: ICD-10-CM

## 2024-11-20 DIAGNOSIS — I48.91 UNSPECIFIED ATRIAL FIBRILLATION: ICD-10-CM

## 2024-11-20 DIAGNOSIS — E78.00 PURE HYPERCHOLESTEROLEMIA, UNSPECIFIED: ICD-10-CM

## 2024-11-20 DIAGNOSIS — R60.0 LOCALIZED EDEMA: ICD-10-CM

## 2024-11-20 DIAGNOSIS — I10 ESSENTIAL (PRIMARY) HYPERTENSION: ICD-10-CM

## 2024-11-20 PROCEDURE — 93000 ELECTROCARDIOGRAM COMPLETE: CPT

## 2024-11-20 PROCEDURE — 99214 OFFICE O/P EST MOD 30 MIN: CPT

## 2024-12-03 DIAGNOSIS — I71.40 ABDOMINAL AORTIC ANEURYSM, WITHOUT RUPTURE, UNSPECIFIED: ICD-10-CM

## 2024-12-10 ENCOUNTER — APPOINTMENT (OUTPATIENT)
Dept: CARDIOLOGY | Facility: CLINIC | Age: 79
End: 2024-12-10
Payer: MEDICARE

## 2024-12-10 PROCEDURE — 93970 EXTREMITY STUDY: CPT

## 2024-12-19 ENCOUNTER — APPOINTMENT (OUTPATIENT)
Dept: CARDIOLOGY | Facility: CLINIC | Age: 79
End: 2024-12-19

## 2024-12-20 ENCOUNTER — OUTPATIENT (OUTPATIENT)
Dept: OUTPATIENT SERVICES | Facility: HOSPITAL | Age: 79
LOS: 1 days | End: 2024-12-20
Payer: MEDICARE

## 2024-12-20 ENCOUNTER — APPOINTMENT (OUTPATIENT)
Dept: CT IMAGING | Facility: CLINIC | Age: 79
End: 2024-12-20
Payer: MEDICARE

## 2024-12-20 DIAGNOSIS — Z90.49 ACQUIRED ABSENCE OF OTHER SPECIFIED PARTS OF DIGESTIVE TRACT: Chronic | ICD-10-CM

## 2024-12-20 DIAGNOSIS — Z98.49 CATARACT EXTRACTION STATUS, UNSPECIFIED EYE: Chronic | ICD-10-CM

## 2024-12-20 DIAGNOSIS — I77.810 THORACIC AORTIC ECTASIA: ICD-10-CM

## 2024-12-20 PROCEDURE — 71275 CT ANGIOGRAPHY CHEST: CPT | Mod: 26,MH

## 2024-12-20 PROCEDURE — 71275 CT ANGIOGRAPHY CHEST: CPT

## 2025-01-08 ENCOUNTER — NON-APPOINTMENT (OUTPATIENT)
Age: 80
End: 2025-01-08

## 2025-02-20 ENCOUNTER — OUTPATIENT (OUTPATIENT)
Dept: OUTPATIENT SERVICES | Facility: HOSPITAL | Age: 80
LOS: 1 days | End: 2025-02-20
Payer: MEDICARE

## 2025-02-20 ENCOUNTER — APPOINTMENT (OUTPATIENT)
Dept: MRI IMAGING | Facility: CLINIC | Age: 80
End: 2025-02-20
Payer: MEDICARE

## 2025-02-20 DIAGNOSIS — Z00.8 ENCOUNTER FOR OTHER GENERAL EXAMINATION: ICD-10-CM

## 2025-02-20 DIAGNOSIS — Z98.49 CATARACT EXTRACTION STATUS, UNSPECIFIED EYE: Chronic | ICD-10-CM

## 2025-02-20 DIAGNOSIS — Z90.49 ACQUIRED ABSENCE OF OTHER SPECIFIED PARTS OF DIGESTIVE TRACT: Chronic | ICD-10-CM

## 2025-02-20 PROCEDURE — 72148 MRI LUMBAR SPINE W/O DYE: CPT | Mod: MH

## 2025-02-20 PROCEDURE — 72148 MRI LUMBAR SPINE W/O DYE: CPT | Mod: 26

## 2025-02-21 ENCOUNTER — APPOINTMENT (OUTPATIENT)
Dept: ORTHOPEDIC SURGERY | Facility: CLINIC | Age: 80
End: 2025-02-21
Payer: MEDICARE

## 2025-02-21 VITALS
HEIGHT: 72 IN | DIASTOLIC BLOOD PRESSURE: 81 MMHG | WEIGHT: 195 LBS | BODY MASS INDEX: 26.41 KG/M2 | SYSTOLIC BLOOD PRESSURE: 153 MMHG | HEART RATE: 91 BPM

## 2025-02-21 DIAGNOSIS — M21.371 FOOT DROP, RIGHT FOOT: ICD-10-CM

## 2025-02-21 PROCEDURE — 22310 CLOSED TX VERT FX W/O MANJ: CPT

## 2025-02-21 PROCEDURE — 99205 OFFICE O/P NEW HI 60 MIN: CPT | Mod: 57

## 2025-02-24 ENCOUNTER — APPOINTMENT (OUTPATIENT)
Dept: MRI IMAGING | Facility: CLINIC | Age: 80
End: 2025-02-24
Payer: MEDICARE

## 2025-02-24 PROCEDURE — 72146 MRI CHEST SPINE W/O DYE: CPT

## 2025-02-24 PROCEDURE — 72141 MRI NECK SPINE W/O DYE: CPT

## 2025-02-25 ENCOUNTER — APPOINTMENT (OUTPATIENT)
Dept: ORTHOPEDIC SURGERY | Facility: CLINIC | Age: 80
End: 2025-02-25
Payer: MEDICARE

## 2025-02-25 VITALS — BODY MASS INDEX: 26.41 KG/M2 | WEIGHT: 195 LBS | HEIGHT: 72 IN

## 2025-02-25 DIAGNOSIS — M48.062 SPINAL STENOSIS, LUMBAR REGION WITH NEUROGENIC CLAUDICATION: ICD-10-CM

## 2025-02-25 DIAGNOSIS — R26.9 UNSPECIFIED ABNORMALITIES OF GAIT AND MOBILITY: ICD-10-CM

## 2025-02-25 DIAGNOSIS — S32.000A WEDGE COMPRESSION FRACTURE OF UNSPECIFIED LUMBAR VERTEBRA, INITIAL ENCOUNTER FOR CLOSED FRACTURE: ICD-10-CM

## 2025-02-25 PROCEDURE — 99215 OFFICE O/P EST HI 40 MIN: CPT | Mod: 24

## 2025-03-06 ENCOUNTER — OUTPATIENT (OUTPATIENT)
Dept: OUTPATIENT SERVICES | Facility: HOSPITAL | Age: 80
LOS: 1 days | End: 2025-03-06
Payer: MEDICARE

## 2025-03-06 VITALS
RESPIRATION RATE: 18 BRPM | TEMPERATURE: 97 F | HEART RATE: 85 BPM | SYSTOLIC BLOOD PRESSURE: 110 MMHG | DIASTOLIC BLOOD PRESSURE: 62 MMHG | OXYGEN SATURATION: 96 % | HEIGHT: 72 IN | WEIGHT: 197.31 LBS

## 2025-03-06 DIAGNOSIS — Z29.9 ENCOUNTER FOR PROPHYLACTIC MEASURES, UNSPECIFIED: ICD-10-CM

## 2025-03-06 DIAGNOSIS — M48.062 SPINAL STENOSIS, LUMBAR REGION WITH NEUROGENIC CLAUDICATION: ICD-10-CM

## 2025-03-06 DIAGNOSIS — I48.20 CHRONIC ATRIAL FIBRILLATION, UNSPECIFIED: ICD-10-CM

## 2025-03-06 DIAGNOSIS — S32.000A WEDGE COMPRESSION FRACTURE OF UNSPECIFIED LUMBAR VERTEBRA, INITIAL ENCOUNTER FOR CLOSED FRACTURE: ICD-10-CM

## 2025-03-06 DIAGNOSIS — Z98.890 OTHER SPECIFIED POSTPROCEDURAL STATES: Chronic | ICD-10-CM

## 2025-03-06 DIAGNOSIS — Z90.49 ACQUIRED ABSENCE OF OTHER SPECIFIED PARTS OF DIGESTIVE TRACT: Chronic | ICD-10-CM

## 2025-03-06 DIAGNOSIS — J84.9 INTERSTITIAL PULMONARY DISEASE, UNSPECIFIED: ICD-10-CM

## 2025-03-06 DIAGNOSIS — Z01.818 ENCOUNTER FOR OTHER PREPROCEDURAL EXAMINATION: ICD-10-CM

## 2025-03-06 DIAGNOSIS — Z98.49 CATARACT EXTRACTION STATUS, UNSPECIFIED EYE: Chronic | ICD-10-CM

## 2025-03-06 LAB
A1C WITH ESTIMATED AVERAGE GLUCOSE RESULT: 4.8 % — SIGNIFICANT CHANGE UP (ref 4–5.6)
ALBUMIN SERPL ELPH-MCNC: 3.3 G/DL — SIGNIFICANT CHANGE UP (ref 3.3–5.2)
ALP SERPL-CCNC: 99 U/L — SIGNIFICANT CHANGE UP (ref 40–120)
ALT FLD-CCNC: 9 U/L — SIGNIFICANT CHANGE UP
ANION GAP SERPL CALC-SCNC: 13 MMOL/L — SIGNIFICANT CHANGE UP (ref 5–17)
APTT BLD: 31.3 SEC — SIGNIFICANT CHANGE UP (ref 24.5–35.6)
AST SERPL-CCNC: 21 U/L — SIGNIFICANT CHANGE UP
BASOPHILS # BLD AUTO: 0.02 K/UL — SIGNIFICANT CHANGE UP (ref 0–0.2)
BASOPHILS NFR BLD AUTO: 0.3 % — SIGNIFICANT CHANGE UP (ref 0–2)
BILIRUB SERPL-MCNC: 0.2 MG/DL — LOW (ref 0.4–2)
BLD GP AB SCN SERPL QL: SIGNIFICANT CHANGE UP
BUN SERPL-MCNC: 49.7 MG/DL — HIGH (ref 8–20)
CALCIUM SERPL-MCNC: 9.4 MG/DL — SIGNIFICANT CHANGE UP (ref 8.4–10.5)
CHLORIDE SERPL-SCNC: 103 MMOL/L — SIGNIFICANT CHANGE UP (ref 96–108)
CO2 SERPL-SCNC: 24 MMOL/L — SIGNIFICANT CHANGE UP (ref 22–29)
CREAT SERPL-MCNC: 1.26 MG/DL — SIGNIFICANT CHANGE UP (ref 0.5–1.3)
EGFR: 57 ML/MIN/1.73M2 — LOW
EGFR: 57 ML/MIN/1.73M2 — LOW
EOSINOPHIL # BLD AUTO: 0.05 K/UL — SIGNIFICANT CHANGE UP (ref 0–0.5)
EOSINOPHIL NFR BLD AUTO: 0.7 % — SIGNIFICANT CHANGE UP (ref 0–6)
ESTIMATED AVERAGE GLUCOSE: 91 MG/DL — SIGNIFICANT CHANGE UP (ref 68–114)
GLUCOSE SERPL-MCNC: 98 MG/DL — SIGNIFICANT CHANGE UP (ref 70–99)
HCT VFR BLD CALC: 26.8 % — LOW (ref 39–50)
HGB BLD-MCNC: 8.1 G/DL — LOW (ref 13–17)
IMM GRANULOCYTES # BLD AUTO: 0.06 K/UL — SIGNIFICANT CHANGE UP (ref 0–0.07)
IMM GRANULOCYTES NFR BLD AUTO: 0.8 % — SIGNIFICANT CHANGE UP (ref 0–0.9)
INR BLD: 1.24 RATIO — HIGH (ref 0.85–1.16)
LYMPHOCYTES # BLD AUTO: 1.17 K/UL — SIGNIFICANT CHANGE UP (ref 1–3.3)
LYMPHOCYTES NFR BLD AUTO: 16.5 % — SIGNIFICANT CHANGE UP (ref 13–44)
MCHC RBC-ENTMCNC: 28.8 PG — SIGNIFICANT CHANGE UP (ref 27–34)
MCHC RBC-ENTMCNC: 30.2 G/DL — LOW (ref 32–36)
MCV RBC AUTO: 95.4 FL — SIGNIFICANT CHANGE UP (ref 80–100)
MONOCYTES # BLD AUTO: 1 K/UL — HIGH (ref 0–0.9)
MONOCYTES NFR BLD AUTO: 14.1 % — HIGH (ref 2–14)
MRSA PCR RESULT.: SIGNIFICANT CHANGE UP
NEUTROPHILS # BLD AUTO: 4.8 K/UL — SIGNIFICANT CHANGE UP (ref 1.8–7.4)
NEUTROPHILS NFR BLD AUTO: 67.6 % — SIGNIFICANT CHANGE UP (ref 43–77)
NRBC # BLD AUTO: 0 K/UL — SIGNIFICANT CHANGE UP (ref 0–0)
NRBC # FLD: 0 K/UL — SIGNIFICANT CHANGE UP (ref 0–0)
NRBC BLD AUTO-RTO: 0 /100 WBCS — SIGNIFICANT CHANGE UP (ref 0–0)
PLATELET # BLD AUTO: 252 K/UL — SIGNIFICANT CHANGE UP (ref 150–400)
PMV BLD: 9.5 FL — SIGNIFICANT CHANGE UP (ref 7–13)
POTASSIUM SERPL-MCNC: 4.6 MMOL/L — SIGNIFICANT CHANGE UP (ref 3.5–5.3)
POTASSIUM SERPL-SCNC: 4.6 MMOL/L — SIGNIFICANT CHANGE UP (ref 3.5–5.3)
PROT SERPL-MCNC: 6 G/DL — LOW (ref 6.6–8.7)
PROTHROM AB SERPL-ACNC: 14 SEC — HIGH (ref 9.9–13.4)
RBC # BLD: 2.81 M/UL — LOW (ref 4.2–5.8)
RBC # FLD: 17.1 % — HIGH (ref 10.3–14.5)
S AUREUS DNA NOSE QL NAA+PROBE: DETECTED
SODIUM SERPL-SCNC: 140 MMOL/L — SIGNIFICANT CHANGE UP (ref 135–145)
WBC # BLD: 7.1 K/UL — SIGNIFICANT CHANGE UP (ref 3.8–10.5)
WBC # FLD AUTO: 7.1 K/UL — SIGNIFICANT CHANGE UP (ref 3.8–10.5)

## 2025-03-06 PROCEDURE — 93005 ELECTROCARDIOGRAM TRACING: CPT

## 2025-03-06 PROCEDURE — 85730 THROMBOPLASTIN TIME PARTIAL: CPT

## 2025-03-06 PROCEDURE — 83036 HEMOGLOBIN GLYCOSYLATED A1C: CPT

## 2025-03-06 PROCEDURE — 80053 COMPREHEN METABOLIC PANEL: CPT

## 2025-03-06 PROCEDURE — 86850 RBC ANTIBODY SCREEN: CPT

## 2025-03-06 PROCEDURE — 36415 COLL VENOUS BLD VENIPUNCTURE: CPT

## 2025-03-06 PROCEDURE — 87641 MR-STAPH DNA AMP PROBE: CPT

## 2025-03-06 PROCEDURE — 85610 PROTHROMBIN TIME: CPT

## 2025-03-06 PROCEDURE — 86901 BLOOD TYPING SEROLOGIC RH(D): CPT

## 2025-03-06 PROCEDURE — 87640 STAPH A DNA AMP PROBE: CPT

## 2025-03-06 PROCEDURE — G0463: CPT

## 2025-03-06 PROCEDURE — 86900 BLOOD TYPING SEROLOGIC ABO: CPT

## 2025-03-06 PROCEDURE — 85025 COMPLETE CBC W/AUTO DIFF WBC: CPT

## 2025-03-06 PROCEDURE — 93010 ELECTROCARDIOGRAM REPORT: CPT

## 2025-03-06 RX ORDER — IBUPROFEN 200 MG
1 TABLET ORAL
Refills: 0 | DISCHARGE

## 2025-03-06 RX ORDER — ASPIRIN 325 MG
0 TABLET ORAL
Refills: 0 | DISCHARGE

## 2025-03-06 RX ORDER — APIXABAN 2.5 MG/1
1 TABLET, FILM COATED ORAL
Refills: 0 | DISCHARGE

## 2025-03-06 RX ORDER — METOPROLOL SUCCINATE 50 MG/1
1 TABLET, EXTENDED RELEASE ORAL
Refills: 0 | DISCHARGE

## 2025-03-06 RX ORDER — TAMSULOSIN HYDROCHLORIDE 0.4 MG/1
1 CAPSULE ORAL
Refills: 0 | DISCHARGE

## 2025-03-06 NOTE — CHART NOTE - NSCHARTNOTEFT_GEN_A_CORE
Hx of chronic back pain  Scheduled for L5 Kyphoplasty w/Dr Bravo on 4/2/2025    Search Terms: Ellis Juarez, 1945    Search Date: 03/06/2025 19:59:50 PM  The Drug Utilization Report below displays all of the controlled substance prescriptions, if any, that your patient has filled in the last twelve months. The information displayed on this report is compiled from pharmacy submissions to the Department, and accurately reflects the information as submitted by the pharmacies.    This report was requested by: Colleen Santiago | Reference #: [id number]    You have not added a MANA number. Keeping your MANA number(s) up to date on the My MANA # tab will enable the separation of your prescriptions from others in the search results.    Practitioner Count: 1  Pharmacy Count: 1  Current Opioid Prescriptions: 0  Current Benzodiazepine Prescriptions: 0  Current Stimulant Prescriptions: 0      Patient Demographic Information (PDI)       PDI	First Name	Last Name	Birth Date	Gender	Street Address	[facility]  A	Ellis Juarez	1945	[place]	[address]	[address]  B	Ellis Juarez	1945	Male	[address] [facility]	[id number]    Prescription Information      PDI Filter:    PDI	Current Rx	Drug Type	Rx Written	Rx Dispensed	Drug	Quantity	Days Supply	Prescriber Name	Prescriber MANA #	Payment Method	Dispenser  A	N	O	01/22/2025	01/24/2025	hydrocodone-acetaminophen 7.5-325 mg tablet	90	30	Goldy Ford MD	[id number]	Medicare	[id number]  B	N	O	10/29/2024	10/30/2024	hydrocodone-acetaminophen 7.5-325 mg tablet	90	30	MitGoldy strickland MD	[id number]	Medicare	[id number]  B	N	O	09/27/2024	09/30/2024	hydrocodone-acetaminophen 7.5-325 mg tablet	90	30	MitGoldy strickland MD	[id number]	Medicare	[id number]  B	N	O	08/08/2024	08/13/2024	hydrocodone-acetaminophen 7.5-325 mg tablet	90	30	MitGoldy strickland MD	[id number]	Medicare	[id number]  B	N	O	07/09/2024	07/12/2024	hydrocodone-acetaminophen 7.5-325 mg tablet	90	30	Goldy Ford MD	[id number]	Medicare	[id number]  B	N	O	05/28/2024	05/29/2024	hydrocodone-acetaminophen 7.5-325 mg tablet	90	30	Goldy Ford MD	[id number]	Medicare	[id number]  B	N	O	04/18/2024	04/19/2024	hydrocodone-acetaminophen 7.5-325 mg tablet	90	30	Goldy Ford MD	[id number]	Medicare	[id number]  B	N	O	03/18/2024	03/20/2024	hydrocodone-acetaminophen 7.5-325 mg tablet	90	30	Goldy Ford MD	[id number]	Medicare	[id number]

## 2025-03-06 NOTE — H&P PST ADULT - NSICDXPASTMEDICALHX_GEN_ALL_CORE_FT
PAST MEDICAL HISTORY:  Aneurysm, aorta, thoracic     Chronic atrial fibrillation     HLD (hyperlipidemia)     HTN (hypertension)     ILD (interstitial lung disease)     Rheumatoid arthritis     Skin cancer     Wedge compression fracture of unspecified lumbar vertebra, initial encounter for closed fracture      PAST MEDICAL HISTORY:  Aneurysm, aorta, thoracic     Bilateral lower extremity edema     Cardiac murmur     Chronic atrial fibrillation     History of squamous cell carcinoma of skin     HLD (hyperlipidemia)     HTN (hypertension)     ILD (interstitial lung disease)     Polycythemia     Rheumatoid arthritis     Skin cancer     Wedge compression fracture of unspecified lumbar vertebra, initial encounter for closed fracture

## 2025-03-06 NOTE — H&P PST ADULT - PROBLEM SELECTOR PLAN 2
preop assessment, scheduled for L5 Kyphoplasty w/Dr Bravo on 4/2/2025, pending medical, cardiac, pulmonary optimizations preop assessment, scheduled for L5 Kyphoplasty w/Dr Bravo on 4/2/2025, pending medical and cardiac optimizations

## 2025-03-06 NOTE — H&P PST ADULT - CARDIOVASCULAR
regular rate and rhythm/S1 S2 present/no gallops/no rub/no murmur/no JVD/no pedal edema details… regular rate and rhythm/S1 S2 present/no gallops/no rub/no JVD/murmur/pedal edema S1 S2 present/no gallops/no rub/no JVD/Irregularly irregular rhythm/murmur/pedal edema

## 2025-03-06 NOTE — H&P PST ADULT - PROBLEM SELECTOR PLAN 3
preop assessment, 12 lead EKG ordered and reviewed, cardiac optimization pending  Eliquis and aspirin instructions per cardiology

## 2025-03-06 NOTE — H&P PST ADULT - HISTORY OF PRESENT ILLNESS
81 yo M PMH of HTN, HLD, chronic Afib (on Eliquis), CAD, AAA, Interstitial lung disease (on oral prednisone), Moderate aortic regurgitation, polycythemia, Rheumatoid arthritis (on Enbrel), current everyday smoker, presents with c/o multiple falls and  progressive off balance over the last 2 months. He states he has had approximately 4-5 falls in the last 2 months resulting in a fractured wrist, multiple contusions and abrasions. Patient states his handwriting is changing. States the symptoms are worsening. Pain levels include a current pain level of 6/10, a minimum pain level of 4/10 and a maximum pain level of 10/10. Modifying factors: worsened by bending, changing of position. Relieving factors: rest. Associated Symptoms: ataxia and loss of dexterity, but no incontinence and no urinary retention. Scheduled for L5 Kyphoplasty w/Dr Bravo on 4/2/2025, pending medical, cardiac, pulmonary optimizations    Imaging:     MRI of the lumbar spine: severe multilevel spinal stenosis is what appears to be acute L5  X-rays of the lumbar spine show what appears to be compression of L5 diffuse spondylosis lordosis is maintained with a thoracolumbar kyphosis. 79 yo M PMH of HTN, HLD, chronic Afib (on Eliquis), non-obstructive CAD, AAA, Interstitial lung disease, Moderate aortic regurgitation, polycythemia, Rheumatoid arthritis (on Enbrel), current everyday smoker (1-2 ppd over 20 years, currently down to 2-3 cigarettes daily), presents with c/o multiple falls and  progressive off balance over the last 2 months. He states he has had approximately 4-5 falls in the last 2 months resulting in a fractured wrist, multiple contusions and abrasions. Patient states his handwriting is changing. States the symptoms are worsening. Pain levels include a current pain level of 6/10, a minimum pain level of 4/10 and a maximum pain level of 10/10. Modifying factors: worsened by bending, changing of position. Relieving factors: rest. Associated Symptoms: ataxia and loss of dexterity, but no incontinence and no urinary retention. Scheduled for L5 Kyphoplasty w/Dr Bravo on 4/2/2025, pending medical, cardiac, pulmonary optimizations    Imaging:     MRI of the lumbar spine: severe multilevel spinal stenosis is what appears to be acute L5  X-rays of the lumbar spine show what appears to be compression of L5 diffuse spondylosis lordosis is maintained with a thoracolumbar kyphosis. 79 yo M PMH of HTN, HLD, chronic Afib (on Eliquis), non-obstructive CAD, AAA, Interstitial lung disease, Moderate aortic regurgitation, polycythemia, Rheumatoid arthritis (on Enbrel), current everyday smoker (1-2 ppd over 20 years, now down to 2-3 cigarettes daily), presents with c/o multiple falls and progressive off balance over the last 2 months and lumbar back pain secondary to L5 fracture. Patient states he also had approximately 4-5 falls in the last 2 months resulting in a fractured wrist, multiple contusions and abrasions. Pain levels include a current pain level of 6/10, a minimum pain level of 4/10 and a maximum pain level of 10/10. Modifying factors: worsened by bending, walking, changing of position. Relieving factors include NSAIDs, Tylenol and rest. Associated symptoms: ataxia and loss of dexterity, but no incontinence and no urinary retention. Scheduled for L5 Kyphoplasty w/Dr Bravo on 4/2/2025, pending medical, cardiac, pulmonary optimizations    Imaging:     MRI of the lumbar spine: severe multilevel spinal stenosis is what appears to be acute L5  X-rays of the lumbar spine show what appears to be compression of L5 diffuse spondylosis lordosis is maintained with a thoracolumbar kyphosis. 79 yo M PMH of HTN, HLD, chronic Afib (on Eliquis), non-obstructive CAD, AAA, Interstitial lung disease, Moderate aortic regurgitation, polycythemia, Rheumatoid arthritis (on Enbrel), current everyday smoker (1-2 ppd over 20 years, now down to 2-3 cigarettes daily), presents with c/o multiple falls and progressive off balance over the last 2 months and lumbar back pain secondary to L5 fracture. Patient states he also had approximately 4-5 falls in the last 2 months resulting in a fractured wrist, multiple contusions and abrasions. Pain levels include a current pain level of 6/10, a minimum pain level of 4/10 and a maximum pain level of 10/10. Modifying factors: worsened by bending, walking, changing of position. Relieving factors include NSAIDs, Tylenol and rest. Associated symptoms: ataxia and loss of dexterity, but no incontinence and no urinary retention. Scheduled for L5 Kyphoplasty w/Dr Bravo on 4/2/2025, pending medical and cardiac optimizations    Imaging:     MRI of the lumbar spine: severe multilevel spinal stenosis is what appears to be acute L5  X-rays of the lumbar spine show what appears to be compression of L5 diffuse spondylosis lordosis is maintained with a thoracolumbar kyphosis. 81 yo M PMH of HTN, HLD, chronic Afib (on Eliquis), non-obstructive CAD, AAA, Interstitial lung disease, Moderate aortic regurgitation, polycythemia, Rheumatoid arthritis (on Enbrel), current everyday smoker (1-2 ppd over 20 years, now down to 2-3 cigarettes daily), presents with c/o multiple falls and progressive off balance over the last 2 months and lumbar back pain secondary to L5 fracture. Patient states he also had approximately 4-5 falls in the last 2 months resulting in a fractured wrist, multiple contusions and abrasions. Pain levels include a current pain level of 6/10, a minimum pain level of 4/10 and a maximum pain level of 10/10. Modifying factors: worsened by bending, walking, changing of position. Relieving factors include NSAIDs, Tylenol and rest. Associated symptoms: ataxia and loss of dexterity, but no incontinence and no urinary retention. Patient ambulates with a walker. Scheduled for L5 Kyphoplasty w/Dr Bravo on 4/2/2025, pending medical and cardiac optimizations    Imaging:     MRI of the lumbar spine: severe multilevel spinal stenosis is what appears to be acute L5  X-rays of the lumbar spine show what appears to be compression of L5 diffuse spondylosis lordosis is maintained with a thoracolumbar kyphosis.

## 2025-03-06 NOTE — H&P PST ADULT - ASSESSMENT
81 yo M PMH of HTN, HLD, chronic Afib (on Eliquis), CAD, AAA, Interstitial lung disease (on oral prednisone), Moderate aortic regurgitation, polycythemia, Rheumatoid arthritis (on Enbrel), current everyday smoker, presents with c/o multiple falls and  progressive off balance over the last 2 months. He states he has had approximately 4-5 falls in the last 2 months resulting in a fractured wrist, multiple contusions and abrasions. Patient states his handwriting is changing. States the symptoms are worsening. Pain levels include a current pain level of 6/10, a minimum pain level of 4/10 and a maximum pain level of 10/10. Modifying factors: worsened by bending, changing of position. Relieving factors: rest. Associated Symptoms: ataxia and loss of dexterity, but no incontinence and no urinary retention. Scheduled for L5 Kyphoplasty w/Dr Bravo on 2025, pending medical, cardiac, pulmonary optimizations    Spine booklet provided, ERP protocol reviewed, MSSA/MRSA swab done in pst, results pending.     Patient was educated on preop preparation with written and verbal instructions. Pt was informed to obtain clearances >3 days before surgery. Pt will review medications with PCP. Pt was educated on NSAIDs, multivitamins and herbals that increase the risk of bleeding and need to be stopped 7 days before procedure. Pt verbalized understanding of the above.     OPIOID RISK TOOL    ENRRIQUE EACH BOX THAT APPLIES AND ADD TOTALS AT THE END    FAMILY HISTORY OF SUBSTANCE ABUSE                 FEMALE         MALE                                                Alcohol                             [  ]1 pt          [  ]3pts                                               Illegal Drugs                     [  ]2 pts        [  ]3pts                                               Rx Drugs                           [  ]4 pts        [  ]4 pts    PERSONAL HISTORY OF SUBSTANCE ABUSE                                                                                          Alcohol                             [  ]3 pts       [  ]3 pts                                               Illegal Drugs                     [  ]4 pts        [  ]4 pts                                               Rx Drugs                           [  ]5 pts        [  ]5 pts    AGE BETWEEN 16-45 YEARS                                      [  ]1 pt         [  ]1 pt    HISTORY OF PREADOLESCENT   SEXUAL ABUSE                                                             [  ]3 pts        [  ]0pts    PSYCHOLOGICAL DISEASE                     ADD, OCD, Bipolar, Schizophrenia        [  ]2 pts         [  ]2 pts                      Depression                                               [  ]1 pt           [  ]1 pt           SCORING TOTAL   (add numbers and type here)              ( 0 )                                     A score of 3 or lower indicated LOW risk for future opioid abuse  A score of 4 to 7 indicated moderate risk for future opioid abuse  A score of 8 or higher indicates a high risk for opioid abuse    CAPRINI VTE 2.0 SCORE [CLOT updated 2019]    AGE RELATED RISK FACTORS                                                       MOBILITY RELATED FACTORS  [ ] Age 41-60 years                                            (1 Point)                    [ ] Bed rest                                                        (1 Point)  [ ] Age: 61-74 years                                           (2 Points)                  [ ] Plaster cast                                                   (2 Points)  [x ] Age= 75 years                                              (3 Points)                    [ ] Bed bound for more than 72 hours                 (2 Points)    DISEASE RELATED RISK FACTORS                                               GENDER SPECIFIC FACTORS  [ ] Edema in the lower extremities                       (1 Point)              [ ] Pregnancy                                                     (1 Point)  [ ] Varicose veins                                               (1 Point)                     [ ] Post-partum < 6 weeks                                   (1 Point)             [x ] BMI > 25 Kg/m2                                            (1 Point)                     [ ] Hormonal therapy  or oral contraception          (1 Point)                 [ ] Sepsis (in the previous month)                        (1 Point)               [ ] History of pregnancy complications                 (1 point)  [ ] Pneumonia or serious lung disease                                               [ ] Unexplained or recurrent                     (1 Point)           (in the previous month)                               (1 Point)  [ ] Abnormal pulmonary function test                     (1 Point)                 SURGERY RELATED RISK FACTORS  [ ] Acute myocardial infarction                              (1 Point)               [ ]  Section                                             (1 Point)  [ ] Congestive heart failure (in the previous month)  (1 Point)      [ ] Minor surgery                                                  (1 Point)   [ ] Inflammatory bowel disease                             (1 Point)               [ ] Arthroscopic surgery                                        (2 Points)  [ ] Central venous access                                      (2 Points)                [x ] General surgery lasting more than 45 minutes (2 points)  [ x] Malignancy- Present or previous                   (2 Points)                [ ] Elective arthroplasty                                         (5 points)    [ ] Stroke (in the previous month)                          (5 Points)                                                                                                                                                           HEMATOLOGY RELATED FACTORS                                                 TRAUMA RELATED RISK FACTORS  [ ] Prior episodes of VTE                                     (3 Points)                [ ] Fracture of the hip, pelvis, or leg                       (5 Points)  [ ] Positive family history for VTE                         (3 Points)             [ ] Acute spinal cord injury (in the previous month)  (5 Points)  [ ] Prothrombin 08231 A                                     (3 Points)               [ ] Paralysis  (less than 1 month)                             (5 Points)  [ ] Factor V Leiden                                             (3 Points)                  [ ] Multiple Trauma within 1 month                        (5 Points)  [ ] Lupus anticoagulants                                     (3 Points)                                                           [ ] Anticardiolipin antibodies                               (3 Points)                                                       [ ] High homocysteine in the blood                      (3 Points)                                             [ ] Other congenital or acquired thrombophilia      (3 Points)                                                [ ] Heparin induced thrombocytopenia                  (3 Points)                                     Total Score [    8      ] 79 yo M PMH of HTN, HLD, chronic Afib (on Eliquis), non-obstructive CAD, AAA, Interstitial lung disease, Moderate aortic regurgitation, polycythemia, Rheumatoid arthritis (on Enbrel), current everyday smoker (1-2 ppd over 20 years, now down to 2-3 cigarettes daily), presents with c/o multiple falls and progressive off balance over the last 2 months and lumbar back pain secondary to L5 fracture. Patient states he also had approximately 4-5 falls in the last 2 months resulting in a fractured wrist, multiple contusions and abrasions. Pain levels include a current pain level of 6/10, a minimum pain level of 4/10 and a maximum pain level of 10/10. Modifying factors: worsened by bending, walking, changing of position. Relieving factors include NSAIDs, Tylenol and rest. Associated symptoms: ataxia and loss of dexterity, but no incontinence and no urinary retention. Scheduled for L5 Kyphoplasty w/Dr Bravo on 2025, pending medical, cardiac, pulmonary optimizations    Spine booklet provided, ERP protocol reviewed, MSSA/MRSA swab done in pst, results pending.     Patient was educated on preop preparation with written and verbal instructions. Pt was informed to obtain clearances >3 days before surgery. Pt was instructed to consult with cardiology for preop directions on Eliquis and aspirin. Pt was instructed to consult with rheumatology for preop instructions on Enbrel. Pt was educated on NSAIDs, multivitamins and herbals that increase the risk of bleeding and need to be stopped 7 days before procedure. Pt verbalized understanding of the above.     OPIOID RISK TOOL    ENRRIQUE EACH BOX THAT APPLIES AND ADD TOTALS AT THE END    FAMILY HISTORY OF SUBSTANCE ABUSE                 FEMALE         MALE                                                Alcohol                             [  ]1 pt          [  ]3pts                                               Illegal Drugs                     [  ]2 pts        [  ]3pts                                               Rx Drugs                           [  ]4 pts        [  ]4 pts    PERSONAL HISTORY OF SUBSTANCE ABUSE                                                                                          Alcohol                             [  ]3 pts       [  ]3 pts                                               Illegal Drugs                     [  ]4 pts        [  ]4 pts                                               Rx Drugs                           [  ]5 pts        [  ]5 pts    AGE BETWEEN 16-45 YEARS                                      [  ]1 pt         [  ]1 pt    HISTORY OF PREADOLESCENT   SEXUAL ABUSE                                                             [  ]3 pts        [  ]0pts    PSYCHOLOGICAL DISEASE                     ADD, OCD, Bipolar, Schizophrenia        [  ]2 pts         [  ]2 pts                      Depression                                               [  ]1 pt           [  ]1 pt           SCORING TOTAL   (add numbers and type here)              ( 0 )                                     A score of 3 or lower indicated LOW risk for future opioid abuse  A score of 4 to 7 indicated moderate risk for future opioid abuse  A score of 8 or higher indicates a high risk for opioid abuse    JADIELI VTE 2.0 SCORE [CLOT updated 2019]    AGE RELATED RISK FACTORS                                                       MOBILITY RELATED FACTORS  [ ] Age 41-60 years                                            (1 Point)                    [ ] Bed rest                                                        (1 Point)  [ ] Age: 61-74 years                                           (2 Points)                  [ ] Plaster cast                                                   (2 Points)  [x ] Age= 75 years                                              (3 Points)                    [ ] Bed bound for more than 72 hours                 (2 Points)    DISEASE RELATED RISK FACTORS                                               GENDER SPECIFIC FACTORS  [x ] Edema in the lower extremities                       (1 Point)              [ ] Pregnancy                                                     (1 Point)  [ ] Varicose veins                                               (1 Point)                     [ ] Post-partum < 6 weeks                                   (1 Point)             [x ] BMI > 25 Kg/m2                                            (1 Point)                     [ ] Hormonal therapy  or oral contraception          (1 Point)                 [ ] Sepsis (in the previous month)                        (1 Point)               [ ] History of pregnancy complications                 (1 point)  [ ] Pneumonia or serious lung disease                                               [ ] Unexplained or recurrent                     (1 Point)           (in the previous month)                               (1 Point)  [ ] Abnormal pulmonary function test                     (1 Point)                 SURGERY RELATED RISK FACTORS  [ ] Acute myocardial infarction                              (1 Point)               [ ]  Section                                             (1 Point)  [ ] Congestive heart failure (in the previous month)  (1 Point)      [ ] Minor surgery                                                  (1 Point)   [ ] Inflammatory bowel disease                             (1 Point)               [ ] Arthroscopic surgery                                        (2 Points)  [ ] Central venous access                                      (2 Points)                [x ] General surgery lasting more than 45 minutes (2 points)  [ x] Malignancy- Present or previous                   (2 Points)                [ ] Elective arthroplasty                                         (5 points)    [ ] Stroke (in the previous month)                          (5 Points)                                                                                                                                                           HEMATOLOGY RELATED FACTORS                                                 TRAUMA RELATED RISK FACTORS  [ ] Prior episodes of VTE                                     (3 Points)                [ ] Fracture of the hip, pelvis, or leg                       (5 Points)  [ ] Positive family history for VTE                         (3 Points)             [ ] Acute spinal cord injury (in the previous month)  (5 Points)  [ ] Prothrombin 02781 A                                     (3 Points)               [ ] Paralysis  (less than 1 month)                             (5 Points)  [ ] Factor V Leiden                                             (3 Points)                  [ ] Multiple Trauma within 1 month                        (5 Points)  [ ] Lupus anticoagulants                                     (3 Points)                                                           [ ] Anticardiolipin antibodies                               (3 Points)                                                       [ ] High homocysteine in the blood                      (3 Points)                                             [ ] Other congenital or acquired thrombophilia      (3 Points)                                                [ ] Heparin induced thrombocytopenia                  (3 Points)                                     Total Score [    9     ] 79 yo M PMH of HTN, HLD, chronic Afib (on Eliquis), non-obstructive CAD, AAA, Interstitial lung disease, Moderate aortic regurgitation, polycythemia, Rheumatoid arthritis (on Enbrel), current everyday smoker (1-2 ppd over 20 years, now down to 2-3 cigarettes daily), presents with c/o multiple falls and progressive off balance over the last 2 months and lumbar back pain secondary to L5 fracture. Patient states he also had approximately 4-5 falls in the last 2 months resulting in a fractured wrist, multiple contusions and abrasions. Pain levels include a current pain level of 6/10, a minimum pain level of 4/10 and a maximum pain level of 10/10. Modifying factors: worsened by bending, walking, changing of position. Relieving factors include NSAIDs, Tylenol and rest. Associated symptoms: ataxia and loss of dexterity, but no incontinence and no urinary retention. Scheduled for L5 Kyphoplasty w/Dr Bravo on 2025, pending medical and cardiac optimizations    Spine booklet provided, ERP protocol reviewed, MSSA/MRSA swab done in pst, results pending.     Patient was educated on preop preparation with written and verbal instructions. Pt was informed to obtain clearances >3 days before surgery. Pt was instructed to consult with cardiology for preop directions on Eliquis and aspirin. Pt was instructed to consult with rheumatology for preop instructions on Enbrel. Pt was educated on NSAIDs, multivitamins and herbals that increase the risk of bleeding and need to be stopped 7 days before procedure. Pt verbalized understanding of the above.     OPIOID RISK TOOL    ENRRIQUE EACH BOX THAT APPLIES AND ADD TOTALS AT THE END    FAMILY HISTORY OF SUBSTANCE ABUSE                 FEMALE         MALE                                                Alcohol                             [  ]1 pt          [  ]3pts                                               Illegal Drugs                     [  ]2 pts        [  ]3pts                                               Rx Drugs                           [  ]4 pts        [  ]4 pts    PERSONAL HISTORY OF SUBSTANCE ABUSE                                                                                          Alcohol                             [  ]3 pts       [  ]3 pts                                               Illegal Drugs                     [  ]4 pts        [  ]4 pts                                               Rx Drugs                           [  ]5 pts        [  ]5 pts    AGE BETWEEN 16-45 YEARS                                      [  ]1 pt         [  ]1 pt    HISTORY OF PREADOLESCENT   SEXUAL ABUSE                                                             [  ]3 pts        [  ]0pts    PSYCHOLOGICAL DISEASE                     ADD, OCD, Bipolar, Schizophrenia        [  ]2 pts         [  ]2 pts                      Depression                                               [  ]1 pt           [  ]1 pt           SCORING TOTAL   (add numbers and type here)              ( 0 )                                     A score of 3 or lower indicated LOW risk for future opioid abuse  A score of 4 to 7 indicated moderate risk for future opioid abuse  A score of 8 or higher indicates a high risk for opioid abuse    JADIELI VTE 2.0 SCORE [CLOT updated 2019]    AGE RELATED RISK FACTORS                                                       MOBILITY RELATED FACTORS  [ ] Age 41-60 years                                            (1 Point)                    [ ] Bed rest                                                        (1 Point)  [ ] Age: 61-74 years                                           (2 Points)                  [ ] Plaster cast                                                   (2 Points)  [x ] Age= 75 years                                              (3 Points)                    [ ] Bed bound for more than 72 hours                 (2 Points)    DISEASE RELATED RISK FACTORS                                               GENDER SPECIFIC FACTORS  [x ] Edema in the lower extremities                       (1 Point)              [ ] Pregnancy                                                     (1 Point)  [ ] Varicose veins                                               (1 Point)                     [ ] Post-partum < 6 weeks                                   (1 Point)             [x ] BMI > 25 Kg/m2                                            (1 Point)                     [ ] Hormonal therapy  or oral contraception          (1 Point)                 [ ] Sepsis (in the previous month)                        (1 Point)               [ ] History of pregnancy complications                 (1 point)  [ ] Pneumonia or serious lung disease                                               [ ] Unexplained or recurrent                     (1 Point)           (in the previous month)                               (1 Point)  [ ] Abnormal pulmonary function test                     (1 Point)                 SURGERY RELATED RISK FACTORS  [ ] Acute myocardial infarction                              (1 Point)               [ ]  Section                                             (1 Point)  [ ] Congestive heart failure (in the previous month)  (1 Point)      [ ] Minor surgery                                                  (1 Point)   [ ] Inflammatory bowel disease                             (1 Point)               [ ] Arthroscopic surgery                                        (2 Points)  [ ] Central venous access                                      (2 Points)                [x ] General surgery lasting more than 45 minutes (2 points)  [ x] Malignancy- Present or previous                   (2 Points)                [ ] Elective arthroplasty                                         (5 points)    [ ] Stroke (in the previous month)                          (5 Points)                                                                                                                                                           HEMATOLOGY RELATED FACTORS                                                 TRAUMA RELATED RISK FACTORS  [ ] Prior episodes of VTE                                     (3 Points)                [ ] Fracture of the hip, pelvis, or leg                       (5 Points)  [ ] Positive family history for VTE                         (3 Points)             [ ] Acute spinal cord injury (in the previous month)  (5 Points)  [ ] Prothrombin 08240 A                                     (3 Points)               [ ] Paralysis  (less than 1 month)                             (5 Points)  [ ] Factor V Leiden                                             (3 Points)                  [ ] Multiple Trauma within 1 month                        (5 Points)  [ ] Lupus anticoagulants                                     (3 Points)                                                           [ ] Anticardiolipin antibodies                               (3 Points)                                                       [ ] High homocysteine in the blood                      (3 Points)                                             [ ] Other congenital or acquired thrombophilia      (3 Points)                                                [ ] Heparin induced thrombocytopenia                  (3 Points)                                     Total Score [    9     ] 81 yo M PMH of HTN, HLD, chronic Afib (on Eliquis), non-obstructive CAD, AAA, Interstitial lung disease, Moderate aortic regurgitation, polycythemia, Rheumatoid arthritis (on Enbrel), current everyday smoker (1-2 ppd over 20 years, now down to 2-3 cigarettes daily), presents with c/o multiple falls and progressive off balance over the last 2 months and lumbar back pain secondary to L5 fracture. Patient states he also had approximately 4-5 falls in the last 2 months resulting in a fractured wrist, multiple contusions and abrasions. Pain levels include a current pain level of 6/10, a minimum pain level of 4/10 and a maximum pain level of 10/10. Modifying factors: worsened by bending, walking, changing of position. Relieving factors include NSAIDs, Tylenol and rest. Associated symptoms: ataxia and loss of dexterity, but no incontinence and no urinary retention. Patient ambulates with a walker. Scheduled for L5 Kyphoplasty w/Dr Bravo on 2025, pending medical and cardiac optimizations    Spine booklet provided, ERP protocol reviewed, MSSA/MRSA swab done in pst, results pending.     Patient was educated on preop preparation with written and verbal instructions. Pt was informed to obtain clearances >3 days before surgery. Pt was instructed to consult with cardiology for preop directions on Eliquis and aspirin. Pt was instructed to consult with rheumatology for preop instructions on Enbrel. Pt was educated on NSAIDs, multivitamins and herbals that increase the risk of bleeding and need to be stopped 7 days before procedure. Pt verbalized understanding of the above.     OPIOID RISK TOOL    ENRRIQUE EACH BOX THAT APPLIES AND ADD TOTALS AT THE END    FAMILY HISTORY OF SUBSTANCE ABUSE                 FEMALE         MALE                                                Alcohol                             [  ]1 pt          [  ]3pts                                               Illegal Drugs                     [  ]2 pts        [  ]3pts                                               Rx Drugs                           [  ]4 pts        [  ]4 pts    PERSONAL HISTORY OF SUBSTANCE ABUSE                                                                                          Alcohol                             [  ]3 pts       [  ]3 pts                                               Illegal Drugs                     [  ]4 pts        [  ]4 pts                                               Rx Drugs                           [  ]5 pts        [  ]5 pts    AGE BETWEEN 16-45 YEARS                                      [  ]1 pt         [  ]1 pt    HISTORY OF PREADOLESCENT   SEXUAL ABUSE                                                             [  ]3 pts        [  ]0pts    PSYCHOLOGICAL DISEASE                     ADD, OCD, Bipolar, Schizophrenia        [  ]2 pts         [  ]2 pts                      Depression                                               [  ]1 pt           [  ]1 pt           SCORING TOTAL   (add numbers and type here)              ( 0 )                                     A score of 3 or lower indicated LOW risk for future opioid abuse  A score of 4 to 7 indicated moderate risk for future opioid abuse  A score of 8 or higher indicates a high risk for opioid abuse    JADIELI VTE 2.0 SCORE [CLOT updated 2019]    AGE RELATED RISK FACTORS                                                       MOBILITY RELATED FACTORS  [ ] Age 41-60 years                                            (1 Point)                    [ ] Bed rest                                                        (1 Point)  [ ] Age: 61-74 years                                           (2 Points)                  [ ] Plaster cast                                                   (2 Points)  [x ] Age= 75 years                                              (3 Points)                    [ ] Bed bound for more than 72 hours                 (2 Points)    DISEASE RELATED RISK FACTORS                                               GENDER SPECIFIC FACTORS  [x ] Edema in the lower extremities                       (1 Point)              [ ] Pregnancy                                                     (1 Point)  [ ] Varicose veins                                               (1 Point)                     [ ] Post-partum < 6 weeks                                   (1 Point)             [x ] BMI > 25 Kg/m2                                            (1 Point)                     [ ] Hormonal therapy  or oral contraception          (1 Point)                 [ ] Sepsis (in the previous month)                        (1 Point)               [ ] History of pregnancy complications                 (1 point)  [ ] Pneumonia or serious lung disease                                               [ ] Unexplained or recurrent                     (1 Point)           (in the previous month)                               (1 Point)  [ ] Abnormal pulmonary function test                     (1 Point)                 SURGERY RELATED RISK FACTORS  [ ] Acute myocardial infarction                              (1 Point)               [ ]  Section                                             (1 Point)  [ ] Congestive heart failure (in the previous month)  (1 Point)      [ ] Minor surgery                                                  (1 Point)   [ ] Inflammatory bowel disease                             (1 Point)               [ ] Arthroscopic surgery                                        (2 Points)  [ ] Central venous access                                      (2 Points)                [x ] General surgery lasting more than 45 minutes (2 points)  [ x] Malignancy- Present or previous                   (2 Points)                [ ] Elective arthroplasty                                         (5 points)    [ ] Stroke (in the previous month)                          (5 Points)                                                                                                                                                           HEMATOLOGY RELATED FACTORS                                                 TRAUMA RELATED RISK FACTORS  [ ] Prior episodes of VTE                                     (3 Points)                [ ] Fracture of the hip, pelvis, or leg                       (5 Points)  [ ] Positive family history for VTE                         (3 Points)             [ ] Acute spinal cord injury (in the previous month)  (5 Points)  [ ] Prothrombin 80964 A                                     (3 Points)               [ ] Paralysis  (less than 1 month)                             (5 Points)  [ ] Factor V Leiden                                             (3 Points)                  [ ] Multiple Trauma within 1 month                        (5 Points)  [ ] Lupus anticoagulants                                     (3 Points)                                                           [ ] Anticardiolipin antibodies                               (3 Points)                                                       [ ] High homocysteine in the blood                      (3 Points)                                             [ ] Other congenital or acquired thrombophilia      (3 Points)                                                [ ] Heparin induced thrombocytopenia                  (3 Points)                                     Total Score [    9     ]

## 2025-03-06 NOTE — H&P PST ADULT - NSICDXFAMILYHX_GEN_ALL_CORE_FT
FAMILY HISTORY:  Father  Still living? Unknown  FH: colon cancer, Age at diagnosis: Age Unknown  FH: HTN (hypertension), Age at diagnosis: Age Unknown    Mother  Still living? Unknown  Family history of cancer of vagina, Age at diagnosis: Age Unknown

## 2025-03-06 NOTE — H&P PST ADULT - NEGATIVE CARDIOVASCULAR SYMPTOMS
no chest pain/no palpitations/no peripheral edema/no claudication no chest pain/no palpitations/no claudication

## 2025-03-06 NOTE — H&P PST ADULT - PROBLEM SELECTOR PLAN 4
preop assessment, CTA Chest from 12/20/2024 reviewed, pulmonary optimization pending preop assessment, CTA Chest from 12/20/2024 reviewed, follows with pulmonology, recent pulmonary evaluation from 11/7/2024 reviewed and is in chart

## 2025-03-06 NOTE — H&P PST ADULT - RESPIRATORY
clear to auscultation bilaterally/no wheezes/no rales/no rhonchi/breath sounds equal/respirations non-labored no wheezes/no rales/no rhonchi/respirations non-labored/diminished breath sounds, L/diminished breath sounds, R

## 2025-03-06 NOTE — H&P PST ADULT - NSICDXPASTSURGICALHX_GEN_ALL_CORE_FT
PAST SURGICAL HISTORY:  S/P appendectomy     S/P cataract surgery      PAST SURGICAL HISTORY:  History of Mohs surgery for squamous cell carcinoma of skin     S/P appendectomy     S/P cataract surgery

## 2025-03-07 RX ORDER — MUPIROCIN CALCIUM 20 MG/G
1 CREAM TOPICAL
Qty: 1 | Refills: 0
Start: 2025-03-07 | End: 2025-03-11

## 2025-03-19 ENCOUNTER — APPOINTMENT (OUTPATIENT)
Dept: CARDIOTHORACIC SURGERY | Facility: CLINIC | Age: 80
End: 2025-03-19
Payer: MEDICARE

## 2025-03-19 VITALS
HEIGHT: 72 IN | RESPIRATION RATE: 16 BRPM | DIASTOLIC BLOOD PRESSURE: 78 MMHG | BODY MASS INDEX: 26.41 KG/M2 | SYSTOLIC BLOOD PRESSURE: 126 MMHG | TEMPERATURE: 98 F | WEIGHT: 195 LBS | HEART RATE: 85 BPM | OXYGEN SATURATION: 98 %

## 2025-03-19 PROCEDURE — 99214 OFFICE O/P EST MOD 30 MIN: CPT

## 2025-03-20 ENCOUNTER — APPOINTMENT (OUTPATIENT)
Dept: CARDIOLOGY | Facility: CLINIC | Age: 80
End: 2025-03-20

## 2025-03-20 VITALS
WEIGHT: 190 LBS | BODY MASS INDEX: 25.73 KG/M2 | HEIGHT: 72 IN | DIASTOLIC BLOOD PRESSURE: 60 MMHG | SYSTOLIC BLOOD PRESSURE: 110 MMHG | RESPIRATION RATE: 16 BRPM | HEART RATE: 74 BPM

## 2025-03-20 DIAGNOSIS — M48.062 SPINAL STENOSIS, LUMBAR REGION WITH NEUROGENIC CLAUDICATION: ICD-10-CM

## 2025-03-20 DIAGNOSIS — E78.00 PURE HYPERCHOLESTEROLEMIA, UNSPECIFIED: ICD-10-CM

## 2025-03-20 DIAGNOSIS — I10 ESSENTIAL (PRIMARY) HYPERTENSION: ICD-10-CM

## 2025-03-20 DIAGNOSIS — I77.810 THORACIC AORTIC ECTASIA: ICD-10-CM

## 2025-03-20 DIAGNOSIS — I48.91 UNSPECIFIED ATRIAL FIBRILLATION: ICD-10-CM

## 2025-03-20 DIAGNOSIS — S32.000A WEDGE COMPRESSION FRACTURE OF UNSPECIFIED LUMBAR VERTEBRA, INITIAL ENCOUNTER FOR CLOSED FRACTURE: ICD-10-CM

## 2025-03-20 PROBLEM — R01.1 CARDIAC MURMUR, UNSPECIFIED: Chronic | Status: ACTIVE | Noted: 2025-03-06

## 2025-03-20 PROBLEM — Z85.828 PERSONAL HISTORY OF OTHER MALIGNANT NEOPLASM OF SKIN: Chronic | Status: ACTIVE | Noted: 2025-03-06

## 2025-03-20 PROCEDURE — 99214 OFFICE O/P EST MOD 30 MIN: CPT

## 2025-03-20 PROCEDURE — G2211 COMPLEX E/M VISIT ADD ON: CPT

## 2025-03-20 PROCEDURE — 93000 ELECTROCARDIOGRAM COMPLETE: CPT

## 2025-03-28 ENCOUNTER — APPOINTMENT (OUTPATIENT)
Dept: PULMONOLOGY | Facility: CLINIC | Age: 80
End: 2025-03-28

## 2025-03-28 ENCOUNTER — NON-APPOINTMENT (OUTPATIENT)
Age: 80
End: 2025-03-28

## 2025-04-02 ENCOUNTER — APPOINTMENT (OUTPATIENT)
Dept: ORTHOPEDIC SURGERY | Facility: HOSPITAL | Age: 80
End: 2025-04-02

## 2025-04-08 ENCOUNTER — APPOINTMENT (OUTPATIENT)
Dept: ORTHOPEDIC SURGERY | Facility: CLINIC | Age: 80
End: 2025-04-08

## 2025-04-10 ENCOUNTER — APPOINTMENT (OUTPATIENT)
Dept: PULMONOLOGY | Facility: CLINIC | Age: 80
End: 2025-04-10

## 2025-04-28 ENCOUNTER — APPOINTMENT (OUTPATIENT)
Dept: PULMONOLOGY | Facility: CLINIC | Age: 80
End: 2025-04-28

## 2025-05-06 ENCOUNTER — APPOINTMENT (OUTPATIENT)
Dept: CARDIOLOGY | Facility: CLINIC | Age: 80
End: 2025-05-06

## 2025-05-06 VITALS
RESPIRATION RATE: 16 BRPM | SYSTOLIC BLOOD PRESSURE: 110 MMHG | DIASTOLIC BLOOD PRESSURE: 60 MMHG | HEART RATE: 81 BPM | HEIGHT: 72 IN

## 2025-05-06 DIAGNOSIS — I10 ESSENTIAL (PRIMARY) HYPERTENSION: ICD-10-CM

## 2025-05-06 DIAGNOSIS — R60.0 LOCALIZED EDEMA: ICD-10-CM

## 2025-05-06 DIAGNOSIS — J84.9 INTERSTITIAL PULMONARY DISEASE, UNSPECIFIED: ICD-10-CM

## 2025-05-06 DIAGNOSIS — I77.810 THORACIC AORTIC ECTASIA: ICD-10-CM

## 2025-05-06 DIAGNOSIS — I71.40 ABDOMINAL AORTIC ANEURYSM, WITHOUT RUPTURE, UNSPECIFIED: ICD-10-CM

## 2025-05-06 DIAGNOSIS — I48.91 UNSPECIFIED ATRIAL FIBRILLATION: ICD-10-CM

## 2025-05-06 PROBLEM — D75.1 SECONDARY POLYCYTHEMIA: Chronic | Status: ACTIVE | Noted: 2025-03-06

## 2025-05-06 PROBLEM — E78.5 HYPERLIPIDEMIA, UNSPECIFIED: Chronic | Status: ACTIVE | Noted: 2025-03-06

## 2025-05-06 PROBLEM — S32.000A WEDGE COMPRESSION FRACTURE OF UNSPECIFIED LUMBAR VERTEBRA, INITIAL ENCOUNTER FOR CLOSED FRACTURE: Chronic | Status: ACTIVE | Noted: 2025-03-06

## 2025-05-06 PROCEDURE — G2211 COMPLEX E/M VISIT ADD ON: CPT

## 2025-05-06 PROCEDURE — 99214 OFFICE O/P EST MOD 30 MIN: CPT

## 2025-05-06 PROCEDURE — 93000 ELECTROCARDIOGRAM COMPLETE: CPT

## 2025-05-07 ENCOUNTER — NON-APPOINTMENT (OUTPATIENT)
Age: 80
End: 2025-05-07

## 2025-05-10 ENCOUNTER — OFFICE (OUTPATIENT)
Dept: URBAN - METROPOLITAN AREA CLINIC 115 | Facility: CLINIC | Age: 80
Setting detail: OPHTHALMOLOGY
End: 2025-05-10
Payer: MEDICARE

## 2025-05-10 DIAGNOSIS — H43.811: ICD-10-CM

## 2025-05-10 DIAGNOSIS — H04.123: ICD-10-CM

## 2025-05-10 DIAGNOSIS — Z96.1: ICD-10-CM

## 2025-05-10 DIAGNOSIS — H26.493: ICD-10-CM

## 2025-05-10 DIAGNOSIS — H50.52: ICD-10-CM

## 2025-05-10 PROBLEM — H18.20 CORNEAL EDEMA, UNSPECIFIED: Status: ACTIVE | Noted: 2025-05-10

## 2025-05-10 PROCEDURE — 92004 COMPRE OPH EXAM NEW PT 1/>: CPT | Performed by: OPHTHALMOLOGY

## 2025-05-10 RX ORDER — LOTEPREDNOL ETABONATE 5 MG/ML
2 SUSPENSION/ DROPS OPHTHALMIC
Refills: 0 | DISCHARGE
Start: 2025-05-10 | End: 2025-05-24

## 2025-05-10 RX ORDER — SODIUM CHLORIDE 50 MG/G
1 OINTMENT OPHTHALMIC
Refills: 0 | DISCHARGE
Start: 2025-05-10 | End: 2025-05-24

## 2025-05-10 ASSESSMENT — REFRACTION_MANIFEST
OS_VA1: 20/70-
OD_CYLINDER: -0.25
OS_CYLINDER: -0.25
OS_SPHERE: -7.25
OS_VA1: 20/20-1
OS_SPHERE: PLANO
OD_VA1: 20/40-
OD_SPHERE: PLANO
OS_ADD: +1.50
OD_VA2: 20/20
OD_ADD: +1.50
OD_ADD: +1.25
OS_ADD: +1.25
OD_VA1: 20/20-2
OD_SPHERE: -7.00
OD_AXIS: 085
OS_AXIS: 025
OS_VA2: 20/20

## 2025-05-10 ASSESSMENT — REFRACTION_CURRENTRX
OD_SPHERE: +2.00
OS_SPHERE: +2.00
OD_VPRISM_DIRECTION: SV
OS_CYLINDER: -0.25
OS_SPHERE: -7.25
OD_SPHERE: -7.00
OD_AXIS: 085
OD_CYLINDER: -0.25
OS_OVR_VA: 20/
OD_ADD: +1.50
OD_OVR_VA: 20/
OS_VPRISM_DIRECTION: SV
OS_AXIS: 025
OS_OVR_VA: 20/
OD_OVR_VA: 20/
OS_ADD: +1.50
OS_VPRISM_DIRECTION: SV
OD_VPRISM_DIRECTION: SV

## 2025-05-10 ASSESSMENT — VISUAL ACUITY
OS_BCVA: 20/40
OD_BCVA: 20/250

## 2025-05-10 ASSESSMENT — CORNEAL EDEMA CLINICAL DESCRIPTION: OS_CORNEALEDEMA: 1+

## 2025-05-10 ASSESSMENT — REFRACTION_AUTOREFRACTION
OD_CYLINDER: -0.75
OD_SPHERE: +1.00
OD_AXIS: 069
OS_SPHERE: UTP

## 2025-05-10 ASSESSMENT — CONFRONTATIONAL VISUAL FIELD TEST (CVF)
OD_FINDINGS: FULL
OS_FINDINGS: FULL

## 2025-05-10 ASSESSMENT — CORNEAL SURGICAL SCARRING: OS_SCARRING: MID

## 2025-05-10 ASSESSMENT — CORNEAL EDEMA - FOLDS/STRIAE: OS_FOLDSSTRIAE: 1+

## 2025-05-10 ASSESSMENT — TONOMETRY: OD_IOP_MMHG: 11

## 2025-05-19 ENCOUNTER — TRANSCRIPTION ENCOUNTER (OUTPATIENT)
Age: 80
End: 2025-05-19

## 2025-05-19 ENCOUNTER — INPATIENT (INPATIENT)
Facility: HOSPITAL | Age: 80
LOS: 6 days | Discharge: NURSING, REHAB OR LONG-TERM CARE | DRG: 812 | End: 2025-05-26
Attending: FAMILY MEDICINE | Admitting: STUDENT IN AN ORGANIZED HEALTH CARE EDUCATION/TRAINING PROGRAM
Payer: MEDICARE

## 2025-05-19 VITALS
OXYGEN SATURATION: 96 % | TEMPERATURE: 98 F | HEART RATE: 68 BPM | RESPIRATION RATE: 18 BRPM | WEIGHT: 192.9 LBS | DIASTOLIC BLOOD PRESSURE: 66 MMHG | SYSTOLIC BLOOD PRESSURE: 107 MMHG

## 2025-05-19 DIAGNOSIS — Z98.890 OTHER SPECIFIED POSTPROCEDURAL STATES: Chronic | ICD-10-CM

## 2025-05-19 DIAGNOSIS — Z98.49 CATARACT EXTRACTION STATUS, UNSPECIFIED EYE: Chronic | ICD-10-CM

## 2025-05-19 DIAGNOSIS — Z90.49 ACQUIRED ABSENCE OF OTHER SPECIFIED PARTS OF DIGESTIVE TRACT: Chronic | ICD-10-CM

## 2025-05-19 LAB
ALBUMIN SERPL ELPH-MCNC: 2.2 G/DL — LOW (ref 3.3–5)
ALP SERPL-CCNC: 98 U/L — SIGNIFICANT CHANGE UP (ref 40–120)
ALT FLD-CCNC: 9 U/L — LOW (ref 12–78)
ANION GAP SERPL CALC-SCNC: 5 MMOL/L — SIGNIFICANT CHANGE UP (ref 5–17)
ANISOCYTOSIS BLD QL: SLIGHT — SIGNIFICANT CHANGE UP
APTT BLD: 32 SEC — SIGNIFICANT CHANGE UP (ref 26.1–36.8)
AST SERPL-CCNC: 18 U/L — SIGNIFICANT CHANGE UP (ref 15–37)
BASOPHILS # BLD AUTO: 0.02 K/UL — SIGNIFICANT CHANGE UP (ref 0–0.2)
BASOPHILS NFR BLD AUTO: 0.6 % — SIGNIFICANT CHANGE UP (ref 0–2)
BILIRUB SERPL-MCNC: 0.5 MG/DL — SIGNIFICANT CHANGE UP (ref 0.2–1.2)
BLD GP AB SCN SERPL QL: SIGNIFICANT CHANGE UP
BUN SERPL-MCNC: 24 MG/DL — HIGH (ref 7–23)
CALCIUM SERPL-MCNC: 8.3 MG/DL — LOW (ref 8.5–10.1)
CHLORIDE SERPL-SCNC: 110 MMOL/L — HIGH (ref 96–108)
CO2 SERPL-SCNC: 28 MMOL/L — SIGNIFICANT CHANGE UP (ref 22–31)
CREAT SERPL-MCNC: 1.48 MG/DL — HIGH (ref 0.5–1.3)
EGFR: 47 ML/MIN/1.73M2 — LOW
EGFR: 47 ML/MIN/1.73M2 — LOW
EOSINOPHIL # BLD AUTO: 0.15 K/UL — SIGNIFICANT CHANGE UP (ref 0–0.5)
EOSINOPHIL NFR BLD AUTO: 4.2 % — SIGNIFICANT CHANGE UP (ref 0–6)
GLUCOSE SERPL-MCNC: 101 MG/DL — HIGH (ref 70–99)
HCT VFR BLD CALC: 23.8 % — LOW (ref 39–50)
HGB BLD-MCNC: 7.1 G/DL — LOW (ref 13–17)
HYPOCHROMIA BLD QL: SLIGHT — SIGNIFICANT CHANGE UP
IMM GRANULOCYTES # BLD AUTO: 0.02 K/UL — SIGNIFICANT CHANGE UP (ref 0–0.07)
IMM GRANULOCYTES NFR BLD AUTO: 0.6 % — SIGNIFICANT CHANGE UP (ref 0–0.9)
INR BLD: 1.42 RATIO — HIGH (ref 0.85–1.16)
LYMPHOCYTES # BLD AUTO: 1.29 K/UL — SIGNIFICANT CHANGE UP (ref 1–3.3)
LYMPHOCYTES NFR BLD AUTO: 36.2 % — SIGNIFICANT CHANGE UP (ref 13–44)
MACROCYTES BLD QL: SLIGHT — SIGNIFICANT CHANGE UP
MANUAL SMEAR VERIFICATION: SIGNIFICANT CHANGE UP
MCHC RBC-ENTMCNC: 29.8 G/DL — LOW (ref 32–36)
MCHC RBC-ENTMCNC: 30 PG — SIGNIFICANT CHANGE UP (ref 27–34)
MCV RBC AUTO: 100.4 FL — HIGH (ref 80–100)
MICROCYTES BLD QL: SLIGHT — SIGNIFICANT CHANGE UP
MONOCYTES # BLD AUTO: 0.63 K/UL — SIGNIFICANT CHANGE UP (ref 0–0.9)
MONOCYTES NFR BLD AUTO: 17.7 % — HIGH (ref 2–14)
NEUTROPHILS # BLD AUTO: 1.45 K/UL — LOW (ref 1.8–7.4)
NEUTROPHILS NFR BLD AUTO: 40.7 % — LOW (ref 43–77)
NRBC # BLD AUTO: 0 K/UL — SIGNIFICANT CHANGE UP (ref 0–0)
NRBC # FLD: 0 K/UL — SIGNIFICANT CHANGE UP (ref 0–0)
NRBC BLD AUTO-RTO: 0 /100 WBCS — SIGNIFICANT CHANGE UP (ref 0–0)
PLAT MORPH BLD: NORMAL — SIGNIFICANT CHANGE UP
PLATELET # BLD AUTO: 143 K/UL — LOW (ref 150–400)
PMV BLD: 9.8 FL — SIGNIFICANT CHANGE UP (ref 7–13)
POTASSIUM SERPL-MCNC: 3.7 MMOL/L — SIGNIFICANT CHANGE UP (ref 3.5–5.3)
POTASSIUM SERPL-SCNC: 3.7 MMOL/L — SIGNIFICANT CHANGE UP (ref 3.5–5.3)
PROT SERPL-MCNC: 5.6 GM/DL — LOW (ref 6–8.3)
PROTHROM AB SERPL-ACNC: 16.3 SEC — HIGH (ref 9.9–13.4)
RBC # BLD: 2.37 M/UL — LOW (ref 4.2–5.8)
RBC # FLD: 22.3 % — HIGH (ref 10.3–14.5)
RBC BLD AUTO: ABNORMAL
ROULEAUX BLD QL SMEAR: PRESENT
SODIUM SERPL-SCNC: 143 MMOL/L — SIGNIFICANT CHANGE UP (ref 135–145)
WBC # BLD: 3.56 K/UL — LOW (ref 3.8–10.5)
WBC # FLD AUTO: 3.56 K/UL — LOW (ref 3.8–10.5)
WBC MORPHOLOGY: NORMAL — SIGNIFICANT CHANGE UP

## 2025-05-19 PROCEDURE — 99291 CRITICAL CARE FIRST HOUR: CPT | Mod: FS

## 2025-05-19 NOTE — ED ADULT TRIAGE NOTE - CHIEF COMPLAINT QUOTE
[facility].6 from blood work done this morning. Pt denies blood in stool/urine. +eliquis and ASA. PMHx gastric ulcer perforation x3 weeks ago. Pt denies dizziness, chest pain.

## 2025-05-19 NOTE — ED PROVIDER NOTE - NSICDXPASTMEDICALHX_GEN_ALL_CORE_FT
PAST MEDICAL HISTORY:  Aneurysm, aorta, thoracic     Bilateral lower extremity edema     Cardiac murmur     Chronic atrial fibrillation     History of squamous cell carcinoma of skin     HLD (hyperlipidemia)     HTN (hypertension)     ILD (interstitial lung disease)     Polycythemia     Rheumatoid arthritis     Skin cancer     Wedge compression fracture of unspecified lumbar vertebra, initial encounter for closed fracture

## 2025-05-19 NOTE — ED PROVIDER NOTE - CLINICAL SUMMARY MEDICAL DECISION MAKING FREE TEXT BOX
81 y/o M with PMHx of heart murmur, polycythemia, interstitial lung disease, HLD, HTN, aortic aneurysm, RA, chronic afib on Eliquis, appendectomy, recent history of perforated peptic ulcer admitted at [facility], required multiple blood transfusions, was also found to have duodenal bleeding which was clipped, post op infection requiring IV abx and drains, now at [facility] for rehab, presents to the ED c/o low hgb 6.6 on labs drawn today. Plan labs, CT, transfuse.

## 2025-05-19 NOTE — ED PROVIDER NOTE - CRITICAL CARE ATTENDING CONTRIBUTION TO CARE
Critical care time spent evaluating and reevaluating patient, ordering and interpreting diagnostics, ordering therapeutics, speaking to admitting MD, reviewing old records, and documenting. This was a shared visit with CHELITA Arana. Agree with notes. Osvaldo LUI

## 2025-05-19 NOTE — ED ADULT NURSE NOTE - CHIEF COMPLAINT QUOTE
[facility] 6.6 from blood work done this morning. Pt denies blood in stool/urine. +eliquis and ASA. PMHx gastric ulcer perforation x3 weeks ago. Pt denies dizziness, chest pain.

## 2025-05-19 NOTE — PHARMACOTHERAPY INTERVENTION NOTE - COMMENTS
Medication reconciliation completed.  Reviewed Medication list and confirmed med allergies with list from Care Facility "[facility]"; confirmed with Dr. First MedHx.

## 2025-05-19 NOTE — ED PROVIDER NOTE - NSICDXPASTSURGICALHX_GEN_ALL_CORE_FT
PAST SURGICAL HISTORY:  History of Mohs surgery for squamous cell carcinoma of skin     S/P appendectomy     S/P cataract surgery

## 2025-05-19 NOTE — ED PROVIDER NOTE - OBJECTIVE STATEMENT
81 y/o M with PMHx of heart murmur, polycythemia, interstitial lung disease, HLD, HTN, aortic aneurysm, RA, chronic afib on Eliquis, appendectomy, recent history of perforated peptic ulcer admitted at [facility], required multiple blood transfusions, was also found to have duodenal bleeding which was clipped, post op infection requiring IV abx and drains, now at [facility] for rehab, presents to the ED c/o low hgb 6.6 on labs drawn today. Pt's last hgb at the facility was 7.8 prior to today. States they have been drawing blood daily since 4/25/25 and he has not received any transfusions since then.

## 2025-05-19 NOTE — ED PROVIDER NOTE - PROGRESS NOTE DETAILS
79 yo M with afib, on eliquis 2.5 bid, recent history of perforated peptic ulcer admitted at [facility], required multiple blood transfusions, was also found to have duodenal bleeding which was clipped, post op infection requiring IV abx and drains,  now is a rehab at [facility] and was found again to have a low hemoglobin of 6.6 on labs done today.  He feels ok, unsure if his stool is abnormal.  Labs and bleeding scan pending -Ba Arana PA-C Johnny Rhodes   Rectal guaiac: lot [id number], exp 8/31/25, trace heme positive. QC passed. received report from [facility]. Pt having pauses up to 2 second afib. Spoke with Dr. Prince. Will place pt on tele. Osvaldo LUI

## 2025-05-19 NOTE — ED ADULT NURSE NOTE - OBJECTIVE STATEMENT
Pt presents to ed sent in for low hemoglobin. Pt reports getting blood work done yesterday and they told him to come ot the er for a transfusion of anemia and multiple transfusions. Pt denies blood in vomit, urine and stool. Pt endorses lightheadedness, fatigue and headache. A&O x3. +AC.

## 2025-05-20 LAB
ALBUMIN SERPL ELPH-MCNC: 2.2 G/DL — LOW (ref 3.3–5)
ALP SERPL-CCNC: 97 U/L — SIGNIFICANT CHANGE UP (ref 40–120)
ALT FLD-CCNC: 10 U/L — LOW (ref 12–78)
ANION GAP SERPL CALC-SCNC: 6 MMOL/L — SIGNIFICANT CHANGE UP (ref 5–17)
AST SERPL-CCNC: 17 U/L — SIGNIFICANT CHANGE UP (ref 15–37)
BASOPHILS # BLD AUTO: 0.01 K/UL — SIGNIFICANT CHANGE UP (ref 0–0.2)
BASOPHILS NFR BLD AUTO: 0.3 % — SIGNIFICANT CHANGE UP (ref 0–2)
BILIRUB SERPL-MCNC: 0.9 MG/DL — SIGNIFICANT CHANGE UP (ref 0.2–1.2)
BUN SERPL-MCNC: 22 MG/DL — SIGNIFICANT CHANGE UP (ref 7–23)
CALCIUM SERPL-MCNC: 8.4 MG/DL — LOW (ref 8.5–10.1)
CHLORIDE SERPL-SCNC: 110 MMOL/L — HIGH (ref 96–108)
CO2 SERPL-SCNC: 28 MMOL/L — SIGNIFICANT CHANGE UP (ref 22–31)
CREAT SERPL-MCNC: 1.34 MG/DL — HIGH (ref 0.5–1.3)
EGFR: 53 ML/MIN/1.73M2 — LOW
EGFR: 53 ML/MIN/1.73M2 — LOW
EOSINOPHIL # BLD AUTO: 0.11 K/UL — SIGNIFICANT CHANGE UP (ref 0–0.5)
EOSINOPHIL NFR BLD AUTO: 3.7 % — SIGNIFICANT CHANGE UP (ref 0–6)
GLUCOSE SERPL-MCNC: 83 MG/DL — SIGNIFICANT CHANGE UP (ref 70–99)
HCT VFR BLD CALC: 27.3 % — LOW (ref 39–50)
HCT VFR BLD CALC: 28.3 % — LOW (ref 39–50)
HCT VFR BLD CALC: 29.3 % — LOW (ref 39–50)
HGB BLD-MCNC: 8.4 G/DL — LOW (ref 13–17)
HGB BLD-MCNC: 8.7 G/DL — LOW (ref 13–17)
HGB BLD-MCNC: 9.1 G/DL — LOW (ref 13–17)
IMM GRANULOCYTES # BLD AUTO: 0.02 K/UL — SIGNIFICANT CHANGE UP (ref 0–0.07)
IMM GRANULOCYTES NFR BLD AUTO: 0.7 % — SIGNIFICANT CHANGE UP (ref 0–0.9)
LYMPHOCYTES # BLD AUTO: 1.01 K/UL — SIGNIFICANT CHANGE UP (ref 1–3.3)
LYMPHOCYTES NFR BLD AUTO: 33.9 % — SIGNIFICANT CHANGE UP (ref 13–44)
MAGNESIUM SERPL-MCNC: 1.8 MG/DL — SIGNIFICANT CHANGE UP (ref 1.6–2.6)
MANUAL SMEAR VERIFICATION: SIGNIFICANT CHANGE UP
MCHC RBC-ENTMCNC: 30 PG — SIGNIFICANT CHANGE UP (ref 27–34)
MCHC RBC-ENTMCNC: 30.1 PG — SIGNIFICANT CHANGE UP (ref 27–34)
MCHC RBC-ENTMCNC: 30.2 PG — SIGNIFICANT CHANGE UP (ref 27–34)
MCHC RBC-ENTMCNC: 30.7 G/DL — LOW (ref 32–36)
MCHC RBC-ENTMCNC: 30.8 G/DL — LOW (ref 32–36)
MCHC RBC-ENTMCNC: 31.1 G/DL — LOW (ref 32–36)
MCV RBC AUTO: 97.3 FL — SIGNIFICANT CHANGE UP (ref 80–100)
MCV RBC AUTO: 97.6 FL — SIGNIFICANT CHANGE UP (ref 80–100)
MCV RBC AUTO: 97.8 FL — SIGNIFICANT CHANGE UP (ref 80–100)
MONOCYTES # BLD AUTO: 0.43 K/UL — SIGNIFICANT CHANGE UP (ref 0–0.9)
MONOCYTES NFR BLD AUTO: 14.4 % — HIGH (ref 2–14)
NEUTROPHILS # BLD AUTO: 1.4 K/UL — LOW (ref 1.8–7.4)
NEUTROPHILS NFR BLD AUTO: 47 % — SIGNIFICANT CHANGE UP (ref 43–77)
NRBC # BLD AUTO: 0 K/UL — SIGNIFICANT CHANGE UP (ref 0–0)
NRBC # FLD: 0 K/UL — SIGNIFICANT CHANGE UP (ref 0–0)
NRBC BLD AUTO-RTO: 0 /100 WBCS — SIGNIFICANT CHANGE UP (ref 0–0)
OB PNL STL: NEGATIVE — SIGNIFICANT CHANGE UP
PHOSPHATE SERPL-MCNC: 3.1 MG/DL — SIGNIFICANT CHANGE UP (ref 2.5–4.5)
PLAT MORPH BLD: SIGNIFICANT CHANGE UP
PLATELET # BLD AUTO: 114 K/UL — LOW (ref 150–400)
PLATELET # BLD AUTO: 141 K/UL — LOW (ref 150–400)
PLATELET # BLD AUTO: 150 K/UL — SIGNIFICANT CHANGE UP (ref 150–400)
PMV BLD: 10 FL — SIGNIFICANT CHANGE UP (ref 7–13)
PMV BLD: 9.7 FL — SIGNIFICANT CHANGE UP (ref 7–13)
PMV BLD: 9.9 FL — SIGNIFICANT CHANGE UP (ref 7–13)
POTASSIUM SERPL-MCNC: 3.5 MMOL/L — SIGNIFICANT CHANGE UP (ref 3.5–5.3)
POTASSIUM SERPL-SCNC: 3.5 MMOL/L — SIGNIFICANT CHANGE UP (ref 3.5–5.3)
PROT SERPL-MCNC: 5.6 GM/DL — LOW (ref 6–8.3)
RBC # BLD: 2.79 M/UL — LOW (ref 4.2–5.8)
RBC # BLD: 2.9 M/UL — LOW (ref 4.2–5.8)
RBC # BLD: 3.01 M/UL — LOW (ref 4.2–5.8)
RBC # FLD: 20.9 % — HIGH (ref 10.3–14.5)
RBC # FLD: 21.2 % — HIGH (ref 10.3–14.5)
RBC # FLD: 21.2 % — HIGH (ref 10.3–14.5)
RBC BLD AUTO: SIGNIFICANT CHANGE UP
SODIUM SERPL-SCNC: 144 MMOL/L — SIGNIFICANT CHANGE UP (ref 135–145)
WBC # BLD: 2.98 K/UL — LOW (ref 3.8–10.5)
WBC # BLD: 3.43 K/UL — LOW (ref 3.8–10.5)
WBC # BLD: 3.48 K/UL — LOW (ref 3.8–10.5)
WBC # FLD AUTO: 2.98 K/UL — LOW (ref 3.8–10.5)
WBC # FLD AUTO: 3.43 K/UL — LOW (ref 3.8–10.5)
WBC # FLD AUTO: 3.48 K/UL — LOW (ref 3.8–10.5)
WBC MORPHOLOGY: NORMAL — SIGNIFICANT CHANGE UP

## 2025-05-20 RX ORDER — FUROSEMIDE 10 MG/ML
40 INJECTION INTRAMUSCULAR; INTRAVENOUS DAILY
Refills: 0 | Status: DISCONTINUED | OUTPATIENT
Start: 2025-05-20 | End: 2025-05-24

## 2025-05-20 RX ORDER — ONDANSETRON HCL/PF 4 MG/2 ML
4 VIAL (ML) INJECTION EVERY 8 HOURS
Refills: 0 | Status: DISCONTINUED | OUTPATIENT
Start: 2025-05-20 | End: 2025-05-26

## 2025-05-20 RX ORDER — SUCRALFATE 1 G
1 TABLET ORAL EVERY 6 HOURS
Refills: 0 | Status: DISCONTINUED | OUTPATIENT
Start: 2025-05-20 | End: 2025-05-26

## 2025-05-20 RX ORDER — MAGNESIUM, ALUMINUM HYDROXIDE 200-200 MG
30 TABLET,CHEWABLE ORAL EVERY 4 HOURS
Refills: 0 | Status: DISCONTINUED | OUTPATIENT
Start: 2025-05-20 | End: 2025-05-26

## 2025-05-20 RX ORDER — LIDOCAINE HYDROCHLORIDE 20 MG/ML
1 JELLY TOPICAL DAILY
Refills: 0 | Status: DISCONTINUED | OUTPATIENT
Start: 2025-05-20 | End: 2025-05-26

## 2025-05-20 RX ORDER — ACETAMINOPHEN 500 MG/5ML
650 LIQUID (ML) ORAL EVERY 6 HOURS
Refills: 0 | Status: DISCONTINUED | OUTPATIENT
Start: 2025-05-20 | End: 2025-05-26

## 2025-05-20 RX ORDER — SODIUM CHLORIDE 50 MG/G
1 OINTMENT OPHTHALMIC
Refills: 0 | Status: DISCONTINUED | OUTPATIENT
Start: 2025-05-20 | End: 2025-05-26

## 2025-05-20 RX ORDER — METOPROLOL SUCCINATE 50 MG/1
50 TABLET, EXTENDED RELEASE ORAL DAILY
Refills: 0 | Status: DISCONTINUED | OUTPATIENT
Start: 2025-05-20 | End: 2025-05-26

## 2025-05-20 RX ORDER — MELATONIN 5 MG
3 TABLET ORAL AT BEDTIME
Refills: 0 | Status: DISCONTINUED | OUTPATIENT
Start: 2025-05-20 | End: 2025-05-26

## 2025-05-20 RX ORDER — OXYCODONE HYDROCHLORIDE AND ACETAMINOPHEN 10; 325 MG/1; MG/1
1 TABLET ORAL EVERY 6 HOURS
Refills: 0 | Status: DISCONTINUED | OUTPATIENT
Start: 2025-05-20 | End: 2025-05-26

## 2025-05-20 RX ORDER — OXYCODONE HYDROCHLORIDE AND ACETAMINOPHEN 10; 325 MG/1; MG/1
2 TABLET ORAL EVERY 6 HOURS
Refills: 0 | Status: DISCONTINUED | OUTPATIENT
Start: 2025-05-20 | End: 2025-05-26

## 2025-05-20 RX ORDER — ROSUVASTATIN CALCIUM 20 MG/1
20 TABLET, FILM COATED ORAL AT BEDTIME
Refills: 0 | Status: DISCONTINUED | OUTPATIENT
Start: 2025-05-20 | End: 2025-05-26

## 2025-05-20 RX ORDER — ACETAMINOPHEN 500 MG/5ML
650 LIQUID (ML) ORAL ONCE
Refills: 0 | Status: DISCONTINUED | OUTPATIENT
Start: 2025-05-20 | End: 2025-05-26

## 2025-05-20 RX ORDER — FLUTICASONE PROPIONATE 50 UG/1
1 SPRAY, METERED NASAL
Refills: 0 | Status: DISCONTINUED | OUTPATIENT
Start: 2025-05-20 | End: 2025-05-26

## 2025-05-20 RX ADMIN — Medication 1 GRAM(S): at 18:35

## 2025-05-20 RX ADMIN — SODIUM CHLORIDE 1 DROP(S): 50 OINTMENT OPHTHALMIC at 18:35

## 2025-05-20 RX ADMIN — Medication 20 MILLIEQUIVALENT(S): at 08:58

## 2025-05-20 RX ADMIN — Medication 40 MILLIGRAM(S): at 08:58

## 2025-05-20 RX ADMIN — FLUTICASONE PROPIONATE 1 SPRAY(S): 50 SPRAY, METERED NASAL at 09:03

## 2025-05-20 RX ADMIN — Medication 650 MILLIGRAM(S): at 08:56

## 2025-05-20 RX ADMIN — Medication 1 GRAM(S): at 11:57

## 2025-05-20 RX ADMIN — Medication 40 MILLIGRAM(S): at 21:28

## 2025-05-20 RX ADMIN — FUROSEMIDE 40 MILLIGRAM(S): 10 INJECTION INTRAMUSCULAR; INTRAVENOUS at 08:59

## 2025-05-20 RX ADMIN — ROSUVASTATIN CALCIUM 20 MILLIGRAM(S): 20 TABLET, FILM COATED ORAL at 21:28

## 2025-05-20 NOTE — CONSULT NOTE ADULT - ASSESSMENT
Permanent atrial fibrillation - Dr. Juarez is a candidate for left atrial appendage occlusion device implant as stated by Dr. Judd. We had a thorough discussion on the details of the procedure along with the risks. The option of immediate transition to dual anti-platelet(clopidogrel & aspirin) from apixaban upon implant were reviewed with Dr. Juarez. Dual anti-platelet therapy for 6 months post implant will be followed by aspirin 81 mg indefinitely thereafter. Dr. Juarez would like to consult with Dr. Judd further. It has been a pleasure and privilege to be involved in the care of Dr. Juarez.

## 2025-05-20 NOTE — PROVIDER CONTACT NOTE (OTHER) - SITUATION
Patient with bradycardia to 34 sustaining for 6 seconds while asleep
Patient with bradycardia to 31 for 6 seconds while asleep.
-tele called pt heart rate went to 38 did not sustain

## 2025-05-20 NOTE — H&P ADULT - NSCORESITESY/N_GEN_A_CORE_RD
Yes
Doxycycline Counseling:  Patient counseled regarding possible photosensitivity and increased risk for sunburn.  Patient instructed to avoid sunlight, if possible.  When exposed to sunlight, patients should wear protective clothing, sunglasses, and sunscreen.  The patient was instructed to call the office immediately if the following severe adverse effects occur:  hearing changes, easy bruising/bleeding, severe headache, or vision changes.  The patient verbalized understanding of the proper use and possible adverse effects of doxycycline.  All of the patient's questions and concerns were addressed.
Yes

## 2025-05-20 NOTE — DIETITIAN INITIAL EVALUATION ADULT - PERTINENT LABORATORY DATA
05-20    144  |  110[H]  |  22  ----------------------------<  83  3.5   |  28  |  1.34[H]    Ca    8.4[L]      20 May 2025 06:39  Phos  3.1     05-20  Mg     1.8     05-20    TPro  5.6[L]  /  Alb  2.2[L]  /  TBili  0.9  /  DBili  x   /  AST  17  /  ALT  10[L]  /  AlkPhos  97  05-20  A1C with Estimated Average Glucose Result: 4.8 % (03-06-25 @ 16:55)

## 2025-05-20 NOTE — PHYSICAL THERAPY INITIAL EVALUATION ADULT - ADDITIONAL COMMENTS
Pt admitted from sub-acute rehab. Prior to, pt was living in a one-story home, 5 exterior steps to enter.     Most recent functional status at rehab- ambulatory with rolling walker and standby assist.    Pt c/o back pain during evaluation (hx L5 fracture), severely limiting mobility. Spoke with Dr. Malagon, requesting pain medication be ordered as a form of pre-medication for PT intervention. In agreement.

## 2025-05-20 NOTE — PROVIDER CONTACT NOTE (OTHER) - ASSESSMENT
Pt asymptomatic. /56 HR 63 99% oxygen saturation on room air.
pt asymptomatic
Patient asymptomatic. VSS.

## 2025-05-20 NOTE — DIETITIAN INITIAL EVALUATION ADULT - ADD RECOMMEND
1) ADAT per MD; rec'd to advance to low fiber when medically feasible  2) Add high-protein chocolate mousse 1x/day (394kcal, 14g protein) when medically feasible  3) Monitor bowel movements, if no BM for >3 days, consider implementing bowel regimen  4) Encourage protein-rich foods, maximize food preferences  5) MVI w/ minerals daily to ensure 100% RDA met  6) Consider adding appetite stimulant such as Remeron or Marinol 2/2 chronically poor appetite/ PO intake  7) Monitor lytes/ min and replete prn.  8) Confirm goals of care regarding nutrition support  RD will continue to monitor PO intake, labs, hydration, and wt prn. Nutrition recommendations to continue upon discharge. Goal to continue to meet >/= 80% ENN via tolerated route. RD to F/U prn for changes to nutrition dc plan.

## 2025-05-20 NOTE — DIETITIAN INITIAL EVALUATION ADULT - PERTINENT MEDS FT
MEDICATIONS  (STANDING):  acetaminophen     Tablet .. 650 milliGRAM(s) Oral once  fluticasone propionate 50 MICROgram(s)/spray Nasal Spray 1 Spray(s) Both Nostrils two times a day  furosemide    Tablet 40 milliGRAM(s) Oral daily  metoprolol succinate ER 50 milliGRAM(s) Oral daily  pantoprazole  Injectable 40 milliGRAM(s) IV Push every 12 hours  potassium chloride    Tablet ER 20 milliEquivalent(s) Oral daily  rosuvastatin 20 milliGRAM(s) Oral at bedtime  sucralfate 1 Gram(s) Oral every 6 hours    MEDICATIONS  (PRN):  acetaminophen     Tablet .. 650 milliGRAM(s) Oral every 6 hours PRN Temp greater or equal to 38C (100.4F), Mild Pain (1 - 3)  aluminum hydroxide/magnesium hydroxide/simethicone Suspension 30 milliLiter(s) Oral every 4 hours PRN Dyspepsia  melatonin 3 milliGRAM(s) Oral at bedtime PRN Insomnia  ondansetron Injectable 4 milliGRAM(s) IV Push every 8 hours PRN Nausea and/or Vomiting

## 2025-05-20 NOTE — H&P ADULT - ASSESSMENT
81 y/o M with PMHx of heart murmur, polycythemia, interstitial lung disease, HLD, HTN, aortic aneurysm, RA, L5 fracture, chronic afib on Eliquis, appendectomy, recent history of perforated peptic ulcer  Patient to be admitted to the hospitalist service for GI bleed.       Anemia, GI bleed  Significant bleeding risk   Hold AC and aspirin at this time  Continue to monitor h/h closely  transfuse hgb <7   Protonix BID   Sucralfate q6  GI consulted , recommendations appreciated   Continue Clear liquid diet at this time       HTN/HLD  Continue home metoprolol lasix  Continue home statin     Known L5 fracture  Continue multimodal pain control   Discussed with IR, recommend outpatient. follow up.   Continue to work with PT       Code Status Full Code   Dvt ppx - held       75  minutes spent on total encounter. The necessity of the time spent during the encounter on this date of service was due to:     Time spent coordinating the patient's care. This includes reviewing documentation pertinent to this admission, results and imaging. Further tests, medications, and procedures have been ordered as indicated. Laboratory results and the plan of care were communicated to the patient and family.  Discussed care plan with nursing and will discuss plan and care with appropriate consultant(s).

## 2025-05-20 NOTE — CONSULT NOTE ADULT - SUBJECTIVE AND OBJECTIVE BOX
Patient is a 80y old  Male who presents with a chief complaint of fatigue. Dr. Juarez was sent from rehab facility to [facility] after finding of severe anemia.    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 Patient does not have active bleeding at this time. He has had discussion with Dr. Judd regarding left atrial appendage occlusion device implant a few weeks ago. EP Service has been consulted for evaluation for Watchman Device implant.      PAST MEDICAL & SURGICAL HISTORY:  Chronic atrial fibrillation      Rheumatoid arthritis      Aneurysm, aorta, thoracic      Skin cancer      HTN (hypertension)      HLD (hyperlipidemia)      ILD (interstitial lung disease)      Wedge compression fracture of unspecified lumbar vertebra, initial encounter for closed fracture      Bilateral lower extremity edema      History of squamous cell carcinoma of skin      Polycythemia      Cardiac murmur      S/P cataract surgery      S/P appendectomy      History of Mohs surgery for squamous cell carcinoma of skin      PREVIOUS DIAGNOSTIC TESTING:      MEDICATIONS  (STANDING):  acetaminophen     Tablet .. 650 milliGRAM(s) Oral once  fluticasone propionate 50 MICROgram(s)/spray Nasal Spray 1 Spray(s) Both Nostrils two times a day  furosemide    Tablet 40 milliGRAM(s) Oral daily  lidocaine   4% Patch 1 Patch Transdermal daily  metoprolol succinate ER 50 milliGRAM(s) Oral daily  pantoprazole  Injectable 40 milliGRAM(s) IV Push every 12 hours  potassium chloride    Tablet ER 20 milliEquivalent(s) Oral daily  rosuvastatin 20 milliGRAM(s) Oral at bedtime  sodium chloride 5% Solution 1 Drop(s) Both EYES four times a day  sucralfate 1 Gram(s) Oral every 6 hours    MEDICATIONS  (PRN):  acetaminophen     Tablet .. 650 milliGRAM(s) Oral every 6 hours PRN Temp greater or equal to 38C (100.4F), Mild Pain (1 - 3)  aluminum hydroxide/magnesium hydroxide/simethicone Suspension 30 milliLiter(s) Oral every 4 hours PRN Dyspepsia  melatonin 3 milliGRAM(s) Oral at bedtime PRN Insomnia  ondansetron Injectable 4 milliGRAM(s) IV Push every 8 hours PRN Nausea and/or Vomiting  oxycodone    5 mG/acetaminophen 325 mG 1 Tablet(s) Oral every 6 hours PRN Moderate Pain (4 - 6)  oxycodone    5 mG/acetaminophen 325 mG 2 Tablet(s) Oral every 6 hours PRN Severe Pain (7 - 10)      FAMILY HISTORY:  Family history of cancer of vagina (Mother)    FH: colon cancer (Father)    FH: HTN (hypertension) (Father)    REVIEW OF SYSTEMS:    CONSTITUTIONAL: No fever, chills, shakes. Positive for fatigue.  RESPIRATORY: No dyspnea.  CARDIOVASCULAR: No chest pain, palpitations, dizziness, syncope, paroxysmal nocturnal dyspnea, orthopnea, or arm or leg swelling  GASTROINTESTINAL: No abdominal  or epigastric pain, nausea, vomiting, hematemesis, diarrhea, melena or bright red blood.  GENITOURINARY: No hematuria.  NEUROLOGICAL: No headache.  MUSCULOSKELETAL: Positive for back pain.  HEME/LYMPH: Easy bruising.    Vital Signs Last 24 Hrs  T(C): 36.8 (20 May 2025 12:28), Max: 36.8 (20 May 2025 12:28)  T(F): 98.3 (20 May 2025 12:28), Max: 98.3 (20 May 2025 12:28)  HR: 53 (20 May 2025 12:28) (51 - 68)  BP: 118/66 (20 May 2025 12:28) (103/72 - 138/63)  BP(mean): 81 (20 May 2025 03:16) (81 - 86)  RR: 18 (20 May 2025 12:28) (15 - 23)  SpO2: 98% (20 May 2025 12:28) (96% - 100%)    Parameters below as of 20 May 2025 12:28  Patient On (Oxygen Delivery Method): room air        PHYSICAL EXAM:    GENERAL: In no apparent distress, well nourished, and hydrated.  HEAD:  Atraumatic, Normocephalic  HEART: Irregularly irregular rhythm with normal rate, distant heart sound, no murmur.  PULMONARY: Clear to auscultation.  No rales, wheezing, or rhonchi bilaterally.  ABDOMEN: Soft, Nontender, Nondistended; Bowel sounds present.  EXTREMITIES:  No edema.  NEUROLOGICAL: Grossly nonfocal          INTERPRETATION OF TELEMETRY: atrial fibrillation. Average heart rate well controlled.    ECG: atrial fibrillation.    I&O's Detail    20 May 2025 07:01  -  20 May 2025 15:47  --------------------------------------------------------  IN:  Total IN: 0 mL    OUT:    Voided (mL): 300 mL  Total OUT: 300 mL    Total NET: -300 mL          LABS:                        8.7    3.43  )-----------( 141      ( 20 May 2025 12:31 )             28.3     05-20    144  |  110[H]  |  22  ----------------------------<  83  3.5   |  28  |  1.34[H]    Ca    8.4[L]      20 May 2025 06:39  Phos  3.1     05-20  Mg     1.8     05-20    TPro  5.6[L]  /  Alb  2.2[L]  /  TBili  0.9  /  DBili  x   /  AST  17  /  ALT  10[L]  /  AlkPhos  97  05-20        PT/INR - ( 19 May 2025 19:48 )   PT: 16.3 sec;   INR: 1.42 ratio         PTT - ( 19 May 2025 19:48 )  PTT:32.0 sec  Urinalysis Basic - ( 20 May 2025 06:39 )    Color: x / Appearance: x / SG: x / pH: x  Gluc: 83 mg/dL / Ketone: x  / Bili: x / Urobili: x   Blood: x / Protein: x / Nitrite: x   Leuk Esterase: x / RBC: x / WBC x   Sq Epi: x / Non Sq Epi: x / Bacteria: x      BNP  I&O's Detail    20 May 2025 07:01  -  20 May 2025 15:47  --------------------------------------------------------  IN:  Total IN: 0 mL    OUT:    Voided (mL): 300 mL  Total OUT: 300 mL    Total NET: -300 mL

## 2025-05-20 NOTE — DIETITIAN INITIAL EVALUATION ADULT - ORAL INTAKE PTA/DIET HISTORY
Lives at home alone normally; s/p Dionne Whittingtonab. Endorses "terrible" appetite and has not been "a big eater for awhile now." Consumes ~ 2 small meals and likely consuming <75 of ENN chronically.

## 2025-05-20 NOTE — CONSULT NOTE ADULT - SUBJECTIVE AND OBJECTIVE BOX
Patient is a 80y old  Male who presents with a chief complaint of anemia      HPI:  This is an 80 year old male with history ILD, HTN, HLD, AF on Eliquis 2.5 BID, GIB [facility] perforated peptic ulcer s/p ex lap with patch 3/21/25; 3/28/25 duodenal ulcer bleed s/p clips, cautery, and epi injection with then on repeat EGD 4/4- further duodenal bleeding treated with cautery presenting from [facility] for anemia, Hgb 6.6 on routine lab work with on review of facility chart Hgb 7.8--->7.7--->8.1 without melena or hematochezia. In ED here, stool weak positive FOBT brown stool. On evaluation, patient denying melena, hematochezia, or hematemesis. Benign abdominal exam. Denies fever, chills, chest pain, cough.  BP stable with rate control issues, bradycardia with pauses. Hgb here 7.1 on arrival, s/p transfusion 8.4 with repeat this afternoon 8.7. BUN 22; Cr. 1.34. CT imaging without evidence of active GIB.    PAST MEDICAL & SURGICAL HISTORY:  Chronic atrial fibrillation      Rheumatoid arthritis      Aneurysm, aorta, thoracic      Skin cancer      HTN (hypertension)      HLD (hyperlipidemia)      ILD (interstitial lung disease)      Wedge compression fracture of unspecified lumbar vertebra, initial encounter for closed fracture      Bilateral lower extremity edema      History of squamous cell carcinoma of skin      Polycythemia      Cardiac murmur      S/P cataract surgery      S/P appendectomy      History of Mohs surgery for squamous cell carcinoma of skin        FAMILY HISTORY:  Family history of cancer of vagina (Mother)    FH: colon cancer (Father)    FH: HTN (hypertension) (Father)      Social History:      Allergies    No Known Allergies    Intolerances        MEDICATIONS  (STANDING):  acetaminophen     Tablet .. 650 milliGRAM(s) Oral once  fluticasone propionate 50 MICROgram(s)/spray Nasal Spray 1 Spray(s) Both Nostrils two times a day  furosemide    Tablet 40 milliGRAM(s) Oral daily  metoprolol succinate ER 50 milliGRAM(s) Oral daily  pantoprazole  Injectable 40 milliGRAM(s) IV Push every 12 hours  potassium chloride    Tablet ER 20 milliEquivalent(s) Oral daily  rosuvastatin 20 milliGRAM(s) Oral at bedtime  sucralfate 1 Gram(s) Oral every 6 hours    MEDICATIONS  (PRN):  acetaminophen     Tablet .. 650 milliGRAM(s) Oral every 6 hours PRN Temp greater or equal to 38C (100.4F), Mild Pain (1 - 3)  aluminum hydroxide/magnesium hydroxide/simethicone Suspension 30 milliLiter(s) Oral every 4 hours PRN Dyspepsia  melatonin 3 milliGRAM(s) Oral at bedtime PRN Insomnia  ondansetron Injectable 4 milliGRAM(s) IV Push every 8 hours PRN Nausea and/or Vomiting      ROS:  Complete ROS obtained, negative other than what is stated in HPI     PEx  T(C): 36.4 (05-20-25 @ 04:38), Max: 36.7 (05-19-25 @ 22:34)  HR: 63 (05-20-25 @ 05:56) (51 - 68)  BP: 119/56 (05-20-25 @ 05:56) (103/72 - 138/63)  RR: 16 (05-20-25 @ 05:56) (15 - 23)  SpO2: 100% (05-20-25 @ 05:56) (96% - 100%)  Wt(kg): --  General:     Well appearing, well nourished in no distress,   Skin: no rash or prominent lesions  Head: normocephalic, atraumatic     Nose: no external lesions, mucosa non-inflamed, septum and turbinates normal  Neck: Supple Thyroid no thyromegaly,  Heart: RRR, no murmur or gallop.  Normal S1, S2.  No S3, S4.   Lungs: CTA bilaterally, no wheezes, rhonchi, rales.  Breathing unlabored.   Chest wall: Normal insp   Abdomen:  Soft, Non distended nontender positive bowel sounds No peritoneal signs.   Back: spine normal without deformity or tenderness.  Normal ROM   Extremities: No deformities, clubbing, cyanosis, or edema.  Musculoskeletal: Normal gait. No decreased range of motion, instability, atrophy or abnormal strength or tone in the head, neck, spine, ribs, pelvis or extremities.   Neurologic: CN 2-12 normal. Sensation to pain, touch and proprioception normal. DTRs normal in upper and lower extremities. No pathologic reflexes.  Motor normal.  Psychiatric: Oriented X3, intact judgement and insight, normal mood and affect.                          8.4    2.98  )-----------( 114      ( 20 May 2025 06:39 )             27.3     05-20    144  |  110[H]  |  22  ----------------------------<  83  3.5   |  28  |  1.34[H]    Ca    8.4[L]      20 May 2025 06:39  Phos  3.1     05-20  Mg     1.8     05-20    TPro  5.6[L]  /  Alb  2.2[L]  /  TBili  0.9  /  DBili  x   /  AST  17  /  ALT  10[L]  /  AlkPhos  97  05-20    CAPILLARY BLOOD GLUCOSE        PT/INR - ( 19 May 2025 19:48 )   PT: 16.3 sec;   INR: 1.42 ratio         PTT - ( 19 May 2025 19:48 )  PTT:32.0 sec  Urinalysis Basic - ( 20 May 2025 06:39 )    Color: x / Appearance: x / SG: x / pH: x  Gluc: 83 mg/dL / Ketone: x  / Bili: x / Urobili: x   Blood: x / Protein: x / Nitrite: x   Leuk Esterase: x / RBC: x / WBC x   Sq Epi: x / Non Sq Epi: x / Bacteria: x             (20 May 2025 08:48)      PAST MEDICAL & SURGICAL HISTORY:  Chronic atrial fibrillation      Rheumatoid arthritis      Aneurysm, aorta, thoracic      Skin cancer      HTN (hypertension)      HLD (hyperlipidemia)      ILD (interstitial lung disease)      Wedge compression fracture of unspecified lumbar vertebra, initial encounter for closed fracture      Bilateral lower extremity edema      History of squamous cell carcinoma of skin      Polycythemia      Cardiac murmur      S/P cataract surgery      S/P appendectomy      History of Mohs surgery for squamous cell carcinoma of skin    MEDICATIONS  (STANDING):  acetaminophen     Tablet .. 650 milliGRAM(s) Oral once  fluticasone propionate 50 MICROgram(s)/spray Nasal Spray 1 Spray(s) Both Nostrils two times a day  furosemide    Tablet 40 milliGRAM(s) Oral daily  metoprolol succinate ER 50 milliGRAM(s) Oral daily  pantoprazole  Injectable 40 milliGRAM(s) IV Push every 12 hours  potassium chloride    Tablet ER 20 milliEquivalent(s) Oral daily  rosuvastatin 20 milliGRAM(s) Oral at bedtime  sucralfate 1 Gram(s) Oral every 6 hours    MEDICATIONS  (PRN):  acetaminophen     Tablet .. 650 milliGRAM(s) Oral every 6 hours PRN Temp greater or equal to 38C (100.4F), Mild Pain (1 - 3)  aluminum hydroxide/magnesium hydroxide/simethicone Suspension 30 milliLiter(s) Oral every 4 hours PRN Dyspepsia  melatonin 3 milliGRAM(s) Oral at bedtime PRN Insomnia  ondansetron Injectable 4 milliGRAM(s) IV Push every 8 hours PRN Nausea and/or Vomiting    Allergies    No Known Allergies    Intolerances    SOCIAL HISTORY:    FAMILY HISTORY:  Family history of cancer of vagina (Mother)    FH: colon cancer (Father)    FH: HTN (hypertension) (Father)    REVIEW OF SYSTEMS:    CONSTITUTIONAL: No weakness, fevers or chills  EYES/ENT: No visual changes;  No vertigo or throat pain   NECK: No pain or stiffness  RESPIRATORY: No cough, wheezing, hemoptysis; No shortness of breath  CARDIOVASCULAR: No chest pain or palpitations  GASTROINTESTINAL: No abdominal or epigastric pain. No nausea, vomiting, or hematemesis; No diarrhea or constipation. No melena or hematochezia.  GENITOURINARY: No dysuria, frequency or hematuria  NEUROLOGICAL: No numbness or weakness  SKIN: No itching, burning, rashes, or lesions   PSYCH: Normal mood and affect  All other review of systems is negative unless indicated above.    Vital Signs Last 24 Hrs  T(C): 36.8 (20 May 2025 12:28), Max: 36.8 (20 May 2025 12:28)  T(F): 98.3 (20 May 2025 12:28), Max: 98.3 (20 May 2025 12:28)  HR: 53 (20 May 2025 12:28) (51 - 68)  BP: 118/66 (20 May 2025 12:28) (103/72 - 138/63)  BP(mean): 81 (20 May 2025 03:16) (81 - 86)  RR: 18 (20 May 2025 12:28) (15 - 23)  SpO2: 98% (20 May 2025 12:28) (96% - 100%)    Parameters below as of 20 May 2025 12:28  Patient On (Oxygen Delivery Method): room air        PHYSICAL EXAM:    Constitutional: No acute distress, well-developed, non-toxic appearing  HEENT: good phonation, not icteric  Neck: supple, no lymphadenopathy  Respiratory: clear to ascultation bilaterally, no wheezing  Cardiovascular: S1 and S2, regular rate and rhythm, no murmurs rubs or gallops  Gastrointestinal: soft, non-tender, non-distended, +bowel sounds, no rebound or guarding, no surgical scars, no drains  Extremities: No peripheral edema, no cyanosis or clubbing  Vascular: 2+ peripheral pulses, no venous stasis  Neurological: A/O x 3, no focal deficits, no asterixis  Psychiatric: Normal mood, normal affect  Skin: No rashes, not jaundiced    LABS:                        8.7    3.43  )-----------( 141      ( 20 May 2025 12:31 )             28.3     05-20    144  |  110[H]  |  22  ----------------------------<  83  3.5   |  28  |  1.34[H]    Ca    8.4[L]      20 May 2025 06:39  Phos  3.1     05-20  Mg     1.8     05-20    TPro  5.6[L]  /  Alb  2.2[L]  /  TBili  0.9  /  DBili  x   /  AST  17  /  ALT  10[L]  /  AlkPhos  97  05-20    PT/INR - ( 19 May 2025 19:48 )   PT: 16.3 sec;   INR: 1.42 ratio         PTT - ( 19 May 2025 19:48 )  PTT:32.0 sec  LIVER FUNCTIONS - ( 20 May 2025 06:39 )  Alb: 2.2 g/dL / Pro: 5.6 gm/dL / ALK PHOS: 97 U/L / ALT: 10 U/L / AST: 17 U/L / GGT: x             RADIOLOGY & ADDITIONAL STUDIES:    IMPRESSION:  Residual radiopaque enteric contrast significantly limits evaluation for   acute GI bleed, particularly in the colon. Within this limitation no   miguelangel active GI bleed is evident by CT.    Moderate age-indeterminate L5 compression deformity, new compared to 2024    Ectatic ascending aorta measuring up to 4.6 cm (up to 5 cm at the aortic   root).    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     Patient states that at [facility] he was feeling well, in his usual state of sick/recovering health. He denies any overt signs of GI bleeding like hematemesis, melena, hematochezia. No abodminal pain. Tolerating PO. On routine labs he was found to have hgb of 6.6 (there was a slow drift down from 8).  Denies fever, chills, chest pain, cough.  BP stable with rate control issues, bradycardia with pauses. Hgb here 7.1 on arrival, s/p transfusion 8.4 with repeat this afternoon 8.7. BUN 22; Cr. 1.34. CT imaging without evidence of active GIB.      PAST MEDICAL & SURGICAL HISTORY:  Chronic atrial fibrillation      Rheumatoid arthritis      Aneurysm, aorta, thoracic      Skin cancer      HTN (hypertension)      HLD (hyperlipidemia)      ILD (interstitial lung disease)      Wedge compression fracture of unspecified lumbar vertebra, initial encounter for closed fracture      Bilateral lower extremity edema      History of squamous cell carcinoma of skin      Polycythemia      Cardiac murmur      S/P cataract surgery      S/P appendectomy      History of Mohs surgery for squamous cell carcinoma of skin    MEDICATIONS  (STANDING):  acetaminophen     Tablet .. 650 milliGRAM(s) Oral once  fluticasone propionate 50 MICROgram(s)/spray Nasal Spray 1 Spray(s) Both Nostrils two times a day  furosemide    Tablet 40 milliGRAM(s) Oral daily  metoprolol succinate ER 50 milliGRAM(s) Oral daily  pantoprazole  Injectable 40 milliGRAM(s) IV Push every 12 hours  potassium chloride    Tablet ER 20 milliEquivalent(s) Oral daily  rosuvastatin 20 milliGRAM(s) Oral at bedtime  sucralfate 1 Gram(s) Oral every 6 hours    MEDICATIONS  (PRN):  acetaminophen     Tablet .. 650 milliGRAM(s) Oral every 6 hours PRN Temp greater or equal to 38C (100.4F), Mild Pain (1 - 3)  aluminum hydroxide/magnesium hydroxide/simethicone Suspension 30 milliLiter(s) Oral every 4 hours PRN Dyspepsia  melatonin 3 milliGRAM(s) Oral at bedtime PRN Insomnia  ondansetron Injectable 4 milliGRAM(s) IV Push every 8 hours PRN Nausea and/or Vomiting    Allergies    No Known Allergies    Intolerances    SOCIAL HISTORY:  no smoking, drinking or drugs    FAMILY HISTORY:  Family history of cancer of vagina (Mother)     colon cancer (Father)    TN (hypertension) (Father)    REVIEW OF SYSTEMS:    CONSTITUTIONAL: mild weakness, fevers or chills  EYES/ENT: No visual changes;  No vertigo or throat pain   NECK: No pain or stiffness  RESPIRATORY: No cough, wheezing, hemoptysis; No shortness of breath  CARDIOVASCULAR: No chest pain or palpitations  GASTROINTESTINAL: No abdominal or epigastric pain. No nausea, vomiting, or hematemesis; No diarrhea or constipation. No melena or hematochezia.  GENITOURINARY: No dysuria, frequency or hematuria  NEUROLOGICAL: No numbness or weakness  SKIN: No itching, burning, rashes, or lesions   PSYCH: Normal mood and affect  All other review of systems is negative unless indicated above.    Vital Signs Last 24 Hrs  T(C): 36.8 (20 May 2025 12:28), Max: 36.8 (20 May 2025 12:28)  T(F): 98.3 (20 May 2025 12:28), Max: 98.3 (20 May 2025 12:28)  HR: 53 (20 May 2025 12:28) (51 - 68)  BP: 118/66 (20 May 2025 12:28) (103/72 - 138/63)  BP(mean): 81 (20 May 2025 03:16) (81 - 86)  RR: 18 (20 May 2025 12:28) (15 - 23)  SpO2: 98% (20 May 2025 12:28) (96% - 100%)    Parameters below as of 20 May 2025 12:28  Patient On (Oxygen Delivery Method): room air        PHYSICAL EXAM:    Constitutional: No acute distress,  non-toxic appearing  HEENT: good phonation, not icteric  Neck: supple, no lymphadenopathy  Respiratory: clear to ascultation bilaterally, no wheezing  Cardiovascular: S1 and S2, regular rate and rhythm, no murmurs rubs or gallops  Gastrointestinal: soft, non-tender, non-distended, +bowel sounds, no rebound or guarding, + surgical scars, no drains  Extremities: No peripheral edema, no cyanosis or clubbing  Vascular: 2+ peripheral pulses, no venous stasis  Neurological: A/O x 3, no focal deficits, no asterixis  Psychiatric: Normal mood, normal affect  Skin: No rashes, not jaundiced    LABS:                        8.7    3.43  )-----------( 141      ( 20 May 2025 12:31 )             28.3     05-20    144  |  110[H]  |  22  ----------------------------<  83  3.5   |  28  |  1.34[H]    Ca    8.4[L]      20 May 2025 06:39  Phos  3.1     05-20  Mg     1.8     05-20    TPro  5.6[L]  /  Alb  2.2[L]  /  TBili  0.9  /  DBili  x   /  AST  17  /  ALT  10[L]  /  AlkPhos  97  05-20    PT/INR - ( 19 May 2025 19:48 )   PT: 16.3 sec;   INR: 1.42 ratio         PTT - ( 19 May 2025 19:48 )  PTT:32.0 sec  LIVER FUNCTIONS - ( 20 May 2025 06:39 )  Alb: 2.2 g/dL / Pro: 5.6 gm/dL / ALK PHOS: 97 U/L / ALT: 10 U/L / AST: 17 U/L / GGT: x             RADIOLOGY & ADDITIONAL STUDIES:    IMPRESSION:  Residual radiopaque enteric contrast significantly limits evaluation for   acute GI bleed, particularly in the colon. Within this limitation no   miguelangel active GI bleed is evident by CT.    Moderate age-indeterminate L5 compression deformity, new compared to 2024    Ectatic ascending aorta measuring up to 4.6 cm (up to 5 cm at the aortic   root).    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

## 2025-05-20 NOTE — PHYSICAL THERAPY INITIAL EVALUATION ADULT - LEVEL OF INDEPENDENCE: SIT/STAND, REHAB EVAL
Attempted; however unable to perform secondary to uncontrolled back pain. Subsides with rest. RN made aware.

## 2025-05-20 NOTE — CONSULT NOTE ADULT - ASSESSMENT
80 year old male with history ILD, HTN, HLD, AF on Eliquis 2.5 BID, GIB recently hospitalized at [facility] for perforated peptic ulcer s/p ex lap with patch 3/21/25; 3/28/25 duodenal ulcer bleed s/p clips, cautery, and epi injection with then on repeat EGD 4/4- further duodenal bleeding treated with cautery presenting from [facility] for anemia, Hgb 6.6 on routine lab work with on review of facility chart Hgb 7.8--->7.7--->8.1 without melena or hematochezia. In ED here, stool weak positive FOBT brown stool. BP stable with rate control issues HR- SBR with pauses. Hgb here 7.1 on arrival, s/p transfusion 8.4 with repeat this afternoon 8.7.    Imp:   1. Acute on chronic anemia with recent history GIB s/p multiple interventions on DOAC    Rec:  ::PPI BID x 8 weeks post procedure (last 6/4)  then daily for life  ::Continue carafate liquid QID as ordered; pre meals and bedtime  ::With no overt signs of bleeding; would not pursue endoscopic evaluation- likely old ooze with enhancement from DOAC, catching up, continue trend HH  ::Regular diet ok

## 2025-05-20 NOTE — H&P ADULT - HISTORY OF PRESENT ILLNESS
Patient is a 80y old  Male who presents with a chief complaint of   HPI:      Radiology/Imaging, I have personally reviewed:    PAST MEDICAL & SURGICAL HISTORY:  Chronic atrial fibrillation      Rheumatoid arthritis      Aneurysm, aorta, thoracic      Skin cancer      HTN (hypertension)      HLD (hyperlipidemia)      ILD (interstitial lung disease)      Wedge compression fracture of unspecified lumbar vertebra, initial encounter for closed fracture      Bilateral lower extremity edema      History of squamous cell carcinoma of skin      Polycythemia      Cardiac murmur      S/P cataract surgery      S/P appendectomy      History of Mohs surgery for squamous cell carcinoma of skin        FAMILY HISTORY:  Family history of cancer of vagina (Mother)    FH: colon cancer (Father)    FH: HTN (hypertension) (Father)      Social History:      Allergies    No Known Allergies    Intolerances        MEDICATIONS  (STANDING):  acetaminophen     Tablet .. 650 milliGRAM(s) Oral once  fluticasone propionate 50 MICROgram(s)/spray Nasal Spray 1 Spray(s) Both Nostrils two times a day  furosemide    Tablet 40 milliGRAM(s) Oral daily  metoprolol succinate ER 50 milliGRAM(s) Oral daily  pantoprazole  Injectable 40 milliGRAM(s) IV Push every 12 hours  potassium chloride    Tablet ER 20 milliEquivalent(s) Oral daily  rosuvastatin 20 milliGRAM(s) Oral at bedtime  sucralfate 1 Gram(s) Oral every 6 hours    MEDICATIONS  (PRN):  acetaminophen     Tablet .. 650 milliGRAM(s) Oral every 6 hours PRN Temp greater or equal to 38C (100.4F), Mild Pain (1 - 3)  aluminum hydroxide/magnesium hydroxide/simethicone Suspension 30 milliLiter(s) Oral every 4 hours PRN Dyspepsia  melatonin 3 milliGRAM(s) Oral at bedtime PRN Insomnia  ondansetron Injectable 4 milliGRAM(s) IV Push every 8 hours PRN Nausea and/or Vomiting      ROS:  Complete ROS obtained, negative other than what is stated in HPI     PEx  T(C): 36.4 (05-20-25 @ 04:38), Max: 36.7 (05-19-25 @ 22:34)  HR: 63 (05-20-25 @ 05:56) (51 - 68)  BP: 119/56 (05-20-25 @ 05:56) (103/72 - 138/63)  RR: 16 (05-20-25 @ 05:56) (15 - 23)  SpO2: 100% (05-20-25 @ 05:56) (96% - 100%)  Wt(kg): --  General:     Well appearing, well nourished in no distress,   Skin: no rash or prominent lesions  Head: normocephalic, atraumatic     Nose: no external lesions, mucosa non-inflamed, septum and turbinates normal  Neck: Supple Thyroid no thyromegaly,  Heart: RRR, no murmur or gallop.  Normal S1, S2.  No S3, S4.   Lungs: CTA bilaterally, no wheezes, rhonchi, rales.  Breathing unlabored.   Chest wall: Normal insp   Abdomen:  Soft, Non distended nontender positive bowel sounds No peritoneal signs.   Back: spine normal without deformity or tenderness.  Normal ROM   Extremities: No deformities, clubbing, cyanosis, or edema.  Musculoskeletal: Normal gait. No decreased range of motion, instability, atrophy or abnormal strength or tone in the head, neck, spine, ribs, pelvis or extremities.   Neurologic: CN 2-12 normal. Sensation to pain, touch and proprioception normal. DTRs normal in upper and lower extremities. No pathologic reflexes.  Motor normal.  Psychiatric: Oriented X3, intact judgement and insight, normal mood and affect.                          8.4    2.98  )-----------( 114      ( 20 May 2025 06:39 )             27.3     05-20    144  |  110[H]  |  22  ----------------------------<  83  3.5   |  28  |  1.34[H]    Ca    8.4[L]      20 May 2025 06:39  Phos  3.1     05-20  Mg     1.8     05-20    TPro  5.6[L]  /  Alb  2.2[L]  /  TBili  0.9  /  DBili  x   /  AST  17  /  ALT  10[L]  /  AlkPhos  97  05-20    CAPILLARY BLOOD GLUCOSE        PT/INR - ( 19 May 2025 19:48 )   PT: 16.3 sec;   INR: 1.42 ratio         PTT - ( 19 May 2025 19:48 )  PTT:32.0 sec  Urinalysis Basic - ( 20 May 2025 06:39 )    Color: x / Appearance: x / SG: x / pH: x  Gluc: 83 mg/dL / Ketone: x  / Bili: x / Urobili: x   Blood: x / Protein: x / Nitrite: x   Leuk Esterase: x / RBC: x / WBC x   Sq Epi: x / Non Sq Epi: x / Bacteria: x             Patient is a 80y old  Male who presents with a chief complaint of   HPI:   81 y/o M with PMHx of heart murmur, polycythemia, interstitial lung disease, HLD, HTN, aortic aneurysm, RA, L5 fracture, chronic afib on Eliquis, appendectomy, recent history of perforated peptic ulcer admitted at [facility], required multiple blood transfusions, was also found to have duodenal bleeding which was clipped, post op infection requiring IV abx and drains, discharged to [facility] for rehab, presented to the ED after getting routine blood work that showed a hemoglobin of 6.6. Patient was sent to the ED from facility. patient denies any signs of active bleeding. denies bright red blood or black stools. Denies chest pain shortness of breath fevers chills nausea vomiting diarrhea, in the ed patient with /66, T 97.8 HR 68 RR 18 SPO2 96% on Room Air. Patient reports chronic back pain from know L5 fracture. Was scheduled for kyphoplasty prior to recent hospitalization for GI bleed.  Patient found to have hgb 7.1 in the ED and received 1unit of pRBC. Patient to be admitted to the hospitalist service for GI bleed.         Radiology/Imaging, I have personally reviewed:    PAST MEDICAL & SURGICAL HISTORY:  Chronic atrial fibrillation      Rheumatoid arthritis      Aneurysm, aorta, thoracic      Skin cancer      HTN (hypertension)      HLD (hyperlipidemia)      ILD (interstitial lung disease)      Wedge compression fracture of unspecified lumbar vertebra, initial encounter for closed fracture      Bilateral lower extremity edema      History of squamous cell carcinoma of skin      Polycythemia      Cardiac murmur      S/P cataract surgery      S/P appendectomy      History of Mohs surgery for squamous cell carcinoma of skin        FAMILY HISTORY:  Family history of cancer of vagina (Mother)    FH: colon cancer (Father)    FH: HTN (hypertension) (Father)      Social History:  Quit smoking a few month ago. Rarely drinks alcohol, last drink a few months ago, denies any other recreational drug use.     Allergies    No Known Allergies    Intolerances        MEDICATIONS  (STANDING):  acetaminophen     Tablet .. 650 milliGRAM(s) Oral once  fluticasone propionate 50 MICROgram(s)/spray Nasal Spray 1 Spray(s) Both Nostrils two times a day  furosemide    Tablet 40 milliGRAM(s) Oral daily  metoprolol succinate ER 50 milliGRAM(s) Oral daily  pantoprazole  Injectable 40 milliGRAM(s) IV Push every 12 hours  potassium chloride    Tablet ER 20 milliEquivalent(s) Oral daily  rosuvastatin 20 milliGRAM(s) Oral at bedtime  sucralfate 1 Gram(s) Oral every 6 hours    MEDICATIONS  (PRN):  acetaminophen     Tablet .. 650 milliGRAM(s) Oral every 6 hours PRN Temp greater or equal to 38C (100.4F), Mild Pain (1 - 3)  aluminum hydroxide/magnesium hydroxide/simethicone Suspension 30 milliLiter(s) Oral every 4 hours PRN Dyspepsia  melatonin 3 milliGRAM(s) Oral at bedtime PRN Insomnia  ondansetron Injectable 4 milliGRAM(s) IV Push every 8 hours PRN Nausea and/or Vomiting      ROS:  Complete ROS obtained, negative other than what is stated in HPI     PEx  T(C): 36.4 (05-20-25 @ 04:38), Max: 36.7 (05-19-25 @ 22:34)  HR: 63 (05-20-25 @ 05:56) (51 - 68)  BP: 119/56 (05-20-25 @ 05:56) (103/72 - 138/63)  RR: 16 (05-20-25 @ 05:56) (15 - 23)  SpO2: 100% (05-20-25 @ 05:56) (96% - 100%)  Wt(kg): --  General:     Well appearing, well nourished in no distress,   Skin: no rash or prominent lesions  Head: normocephalic, atraumatic     Nose: no external lesions, mucosa non-inflamed, septum and turbinates normal  Neck: Supple Thyroid no thyromegaly,  Heart: RRR, no murmur or gallop.  Normal S1, S2.  No S3, S4.   Lungs: CTA bilaterally, no wheezes, rhonchi, rales.  Breathing unlabored.   Chest wall: Normal insp   Abdomen:  Soft, Non distended nontender positive bowel sounds No peritoneal signs.   Back: spine normal without deformity or tenderness.  Normal ROM   Extremities: No deformities, clubbing, cyanosis, or edema.  Musculoskeletal: Normal gait. No decreased range of motion, instability, atrophy or abnormal strength or tone in the head, neck, spine, ribs, pelvis or extremities.   Neurologic: CN 2-12 normal. Sensation to pain, touch and proprioception normal. DTRs normal in upper and lower extremities. No pathologic reflexes.  Motor normal.  Psychiatric: Oriented X3, intact judgement and insight, normal mood and affect.                          8.4    2.98  )-----------( 114      ( 20 May 2025 06:39 )             27.3     05-20    144  |  110[H]  |  22  ----------------------------<  83  3.5   |  28  |  1.34[H]    Ca    8.4[L]      20 May 2025 06:39  Phos  3.1     05-20  Mg     1.8     05-20    TPro  5.6[L]  /  Alb  2.2[L]  /  TBili  0.9  /  DBili  x   /  AST  17  /  ALT  10[L]  /  AlkPhos  97  05-20    CAPILLARY BLOOD GLUCOSE        PT/INR - ( 19 May 2025 19:48 )   PT: 16.3 sec;   INR: 1.42 ratio         PTT - ( 19 May 2025 19:48 )  PTT:32.0 sec  Urinalysis Basic - ( 20 May 2025 06:39 )    Color: x / Appearance: x / SG: x / pH: x  Gluc: 83 mg/dL / Ketone: x  / Bili: x / Urobili: x   Blood: x / Protein: x / Nitrite: x   Leuk Esterase: x / RBC: x / WBC x   Sq Epi: x / Non Sq Epi: x / Bacteria: x

## 2025-05-20 NOTE — PHYSICAL THERAPY INITIAL EVALUATION ADULT - PERTINENT HX OF CURRENT PROBLEM, REHAB EVAL
Pt is an 80y old male with "PMHx of heart murmur, polycythemia, interstitial lung disease, HLD, HTN, aortic aneurysm, RA, chronic afib on Eliquis, appendectomy, recent history of perforated peptic ulcer admitted at [facility] [place], required multiple blood transfusions, was also found to have duodenal bleeding which was clipped, post op infection requiring IV abx and drains, now at [facility] for rehab, presents to the ED c/o low hgb 6.6 on labs drawn today." Admitted for further management.

## 2025-05-20 NOTE — CONSULT NOTE ADULT - NS ATTEND AMEND GEN_ALL_CORE FT
80 year old man with HTN, HLD, Afib on eliquis, ILD, recent GI bleeding found to have perforated peptic ulcer s/p ex lap with yana patch with another two GI bleeds from duodenal ulcer s/p cautery/epi/clips, discharged to rehab and sent here for anemia on routine labs.     Patient without any miguelangel/overt GI bleeding and CTA negative.   Stable h/h after transfusion and no overt bleeding here.   Very chroniclly ill with multiple medical comorbidities including ILD. At this point would not pursue diagnostic procedures without evidence of new overt/active Gi bleeding.   Continue all meds and give BID PPI for at least 8 weeks then once daily for life.   Best if patient goes to [facility] facilities where he had his surgery/EGD's from now on despite [facility] proximity to our hospital.

## 2025-05-20 NOTE — PROVIDER CONTACT NOTE (OTHER) - ACTION/TREATMENT ORDERED:
Provider made aware with orders to continue monitoring.
pt maintained on tele monitor per MD orders. safety maintained. call bell and personal belongings in reach. no complaints or requests at this time. will continue hourly rounding.
Provider made aware with no new orders at this time. Pt continuously monitored.

## 2025-05-20 NOTE — PROVIDER CONTACT NOTE (OTHER) - REASON
Patient with bradycardia to 34 sustaining for 6 seconds while asleep
patient with bradycardia to 31 for 6 seconds while asleep
heart rate

## 2025-05-20 NOTE — PATIENT PROFILE ADULT - FALL HARM RISK - HARM RISK INTERVENTIONS

## 2025-05-20 NOTE — DIETITIAN INITIAL EVALUATION ADULT - OTHER INFO
81 y/o M with PMHx of heart murmur, polycythemia, interstitial lung disease, HLD, HTN, aortic aneurysm, RA, chronic afib on Eliquis, appendectomy, recent history of perforated peptic ulcer admitted at [place], required multiple blood transfusions, was also found to have duodenal bleeding which was clipped, post op infection requiring IV abx and drains, now at [facility] for rehab, presents to the ED c/o low hgb 6.6 on labs drawn today. Pt's last hgb at the facility was 7.8 prior to today. States they have been drawing blood daily since 4/25/25 and he has not received any transfusions since then.  Admit for anemia, GIB     Seen by nutr services and dx'd w/ malnutrition on 10/13/24; does NOT still meet criteria. Currently on CLD - RD observed breakfast tray and consumed <50%. Reports UBW of ~193# x ? time frame; bed scale wt of 188# taken by RD on 5/20/25. Wt hx as per EMR: 202# (admit wt as per EMR on 10/13/24). Unintentional wt loss of 14# / 6.9% wt loss x 7 mo; not clinically significant. Appears overweight/obese; NFPE reveals minimal muscle/fat wasting. Does NOT meet criteria for PCM; however, remains at HIGH RISK. ADAT per MD; rec'd to advance to low fiber when medically feasible 2/2 GIB. Add high-protein chocolate mousse 1x/day (394kcal, 14g protein) when medically feasible - pt is receptive. Consider adding appetite stimulant such as Remeron or Marinol 2/2 chronically poor appetite/ PO intake. See below for other recommendations.

## 2025-05-21 DIAGNOSIS — D64.9 ANEMIA, UNSPECIFIED: ICD-10-CM

## 2025-05-21 LAB
ANION GAP SERPL CALC-SCNC: 4 MMOL/L — LOW (ref 5–17)
BUN SERPL-MCNC: 21 MG/DL — SIGNIFICANT CHANGE UP (ref 7–23)
CALCIUM SERPL-MCNC: 8.7 MG/DL — SIGNIFICANT CHANGE UP (ref 8.5–10.1)
CHLORIDE SERPL-SCNC: 109 MMOL/L — HIGH (ref 96–108)
CO2 SERPL-SCNC: 28 MMOL/L — SIGNIFICANT CHANGE UP (ref 22–31)
CREAT SERPL-MCNC: 1.22 MG/DL — SIGNIFICANT CHANGE UP (ref 0.5–1.3)
EGFR: 59 ML/MIN/1.73M2 — LOW
EGFR: 59 ML/MIN/1.73M2 — LOW
GLUCOSE SERPL-MCNC: 91 MG/DL — SIGNIFICANT CHANGE UP (ref 70–99)
HCT VFR BLD CALC: 24.8 % — LOW (ref 39–50)
HCT VFR BLD CALC: 26.3 % — LOW (ref 39–50)
HGB BLD-MCNC: 7.8 G/DL — LOW (ref 13–17)
HGB BLD-MCNC: 8.1 G/DL — LOW (ref 13–17)
MCHC RBC-ENTMCNC: 30 PG — SIGNIFICANT CHANGE UP (ref 27–34)
MCHC RBC-ENTMCNC: 30.1 PG — SIGNIFICANT CHANGE UP (ref 27–34)
MCHC RBC-ENTMCNC: 30.8 G/DL — LOW (ref 32–36)
MCHC RBC-ENTMCNC: 31.5 G/DL — LOW (ref 32–36)
MCV RBC AUTO: 95.8 FL — SIGNIFICANT CHANGE UP (ref 80–100)
MCV RBC AUTO: 97.4 FL — SIGNIFICANT CHANGE UP (ref 80–100)
NRBC # BLD AUTO: 0 K/UL — SIGNIFICANT CHANGE UP (ref 0–0)
NRBC # BLD AUTO: 0 K/UL — SIGNIFICANT CHANGE UP (ref 0–0)
NRBC # FLD: 0 K/UL — SIGNIFICANT CHANGE UP (ref 0–0)
NRBC # FLD: 0 K/UL — SIGNIFICANT CHANGE UP (ref 0–0)
NRBC BLD AUTO-RTO: 0 /100 WBCS — SIGNIFICANT CHANGE UP (ref 0–0)
NRBC BLD AUTO-RTO: 0 /100 WBCS — SIGNIFICANT CHANGE UP (ref 0–0)
PLATELET # BLD AUTO: 128 K/UL — LOW (ref 150–400)
PLATELET # BLD AUTO: 136 K/UL — LOW (ref 150–400)
PMV BLD: 10.1 FL — SIGNIFICANT CHANGE UP (ref 7–13)
PMV BLD: 9.9 FL — SIGNIFICANT CHANGE UP (ref 7–13)
POTASSIUM SERPL-MCNC: 3.4 MMOL/L — LOW (ref 3.5–5.3)
POTASSIUM SERPL-SCNC: 3.4 MMOL/L — LOW (ref 3.5–5.3)
RBC # BLD: 2.59 M/UL — LOW (ref 4.2–5.8)
RBC # BLD: 2.7 M/UL — LOW (ref 4.2–5.8)
RBC # FLD: 21.2 % — HIGH (ref 10.3–14.5)
RBC # FLD: 21.2 % — HIGH (ref 10.3–14.5)
SODIUM SERPL-SCNC: 141 MMOL/L — SIGNIFICANT CHANGE UP (ref 135–145)
WBC # BLD: 3.49 K/UL — LOW (ref 3.8–10.5)
WBC # BLD: 3.98 K/UL — SIGNIFICANT CHANGE UP (ref 3.8–10.5)
WBC # FLD AUTO: 3.49 K/UL — LOW (ref 3.8–10.5)
WBC # FLD AUTO: 3.98 K/UL — SIGNIFICANT CHANGE UP (ref 3.8–10.5)

## 2025-05-21 PROCEDURE — 85025 COMPLETE CBC W/AUTO DIFF WBC: CPT

## 2025-05-21 PROCEDURE — 36415 COLL VENOUS BLD VENIPUNCTURE: CPT

## 2025-05-21 PROCEDURE — 99233 SBSQ HOSP IP/OBS HIGH 50: CPT

## 2025-05-21 PROCEDURE — 85027 COMPLETE CBC AUTOMATED: CPT

## 2025-05-21 PROCEDURE — 94640 AIRWAY INHALATION TREATMENT: CPT

## 2025-05-21 PROCEDURE — 80048 BASIC METABOLIC PNL TOTAL CA: CPT

## 2025-05-21 PROCEDURE — 97530 THERAPEUTIC ACTIVITIES: CPT | Mod: GP

## 2025-05-21 PROCEDURE — 97116 GAIT TRAINING THERAPY: CPT | Mod: GP

## 2025-05-21 RX ORDER — TRAMADOL HYDROCHLORIDE 50 MG/1
50 TABLET, FILM COATED ORAL
Refills: 0 | Status: DISCONTINUED | OUTPATIENT
Start: 2025-05-21 | End: 2025-05-26

## 2025-05-21 RX ORDER — ACETAMINOPHEN 500 MG/5ML
1000 LIQUID (ML) ORAL ONCE
Refills: 0 | Status: COMPLETED | OUTPATIENT
Start: 2025-05-21 | End: 2025-05-21

## 2025-05-21 RX ORDER — HYDROMORPHONE/SOD CHLOR,ISO/PF 2 MG/10 ML
0.2 SYRINGE (ML) INJECTION ONCE
Refills: 0 | Status: DISCONTINUED | OUTPATIENT
Start: 2025-05-21 | End: 2025-05-25

## 2025-05-21 RX ADMIN — FUROSEMIDE 40 MILLIGRAM(S): 10 INJECTION INTRAMUSCULAR; INTRAVENOUS at 09:58

## 2025-05-21 RX ADMIN — SODIUM CHLORIDE 1 DROP(S): 50 OINTMENT OPHTHALMIC at 17:45

## 2025-05-21 RX ADMIN — LIDOCAINE HYDROCHLORIDE 1 PATCH: 20 JELLY TOPICAL at 20:06

## 2025-05-21 RX ADMIN — SODIUM CHLORIDE 1 DROP(S): 50 OINTMENT OPHTHALMIC at 23:48

## 2025-05-21 RX ADMIN — Medication 1 GRAM(S): at 23:48

## 2025-05-21 RX ADMIN — ROSUVASTATIN CALCIUM 20 MILLIGRAM(S): 20 TABLET, FILM COATED ORAL at 20:46

## 2025-05-21 RX ADMIN — Medication 20 MILLIEQUIVALENT(S): at 09:58

## 2025-05-21 RX ADMIN — Medication 1 GRAM(S): at 17:45

## 2025-05-21 RX ADMIN — LIDOCAINE HYDROCHLORIDE 1 PATCH: 20 JELLY TOPICAL at 09:59

## 2025-05-21 RX ADMIN — METOPROLOL SUCCINATE 50 MILLIGRAM(S): 50 TABLET, EXTENDED RELEASE ORAL at 09:58

## 2025-05-21 RX ADMIN — Medication 1000 MILLIGRAM(S): at 13:30

## 2025-05-21 RX ADMIN — SODIUM CHLORIDE 1 DROP(S): 50 OINTMENT OPHTHALMIC at 12:21

## 2025-05-21 RX ADMIN — Medication 400 MILLIGRAM(S): at 12:21

## 2025-05-21 RX ADMIN — Medication 1 GRAM(S): at 06:17

## 2025-05-21 RX ADMIN — LIDOCAINE HYDROCHLORIDE 1 PATCH: 20 JELLY TOPICAL at 22:50

## 2025-05-21 RX ADMIN — Medication 400 MILLIGRAM(S): at 22:00

## 2025-05-21 RX ADMIN — Medication 40 MILLIGRAM(S): at 09:58

## 2025-05-21 RX ADMIN — Medication 1000 MILLIGRAM(S): at 22:30

## 2025-05-21 RX ADMIN — Medication 40 MILLIGRAM(S): at 20:46

## 2025-05-21 RX ADMIN — Medication 1 GRAM(S): at 12:26

## 2025-05-21 RX ADMIN — SODIUM CHLORIDE 1 DROP(S): 50 OINTMENT OPHTHALMIC at 06:16

## 2025-05-21 RX ADMIN — Medication 20 MILLIEQUIVALENT(S): at 12:21

## 2025-05-21 RX ADMIN — Medication 3 MILLIGRAM(S): at 20:56

## 2025-05-21 NOTE — CONSULT NOTE ADULT - SUBJECTIVE AND OBJECTIVE BOX
HPI  80yoM with PMHx of heart murmur, polycythemia, interstitial lung disease, HLD, HTN, aortic aneurysm, RA, L5 fracture, chronic afib on Eliquis, appendectomy, recent history of perforated peptic ulcer admitted at [facility] [place], required multiple blood transfusions, was also found to have duodenal bleeding which was clipped, post op infection requiring IV abx and drains, discharged to [facility] for rehab, presented to the ED after getting routine blood work that showed a hemoglobin of 6.6. Patient was scheduled for L5 kyphoplasty at [facility] with Dr. Henson however he missed his scheduled procedure due to his recent hospitalization. Neurosurgery consulted for possible kyphoplasty while admitted. Patient reports chronic low back pain without any radicular leg pain. Back pain worsened in February 2025. Has trouble ambulating because fo back pain. Denies weakness, paresthesias, bowel/bladder dysfunction.       PAST MEDICAL & SURGICAL HISTORY:  Chronic atrial fibrillation      Rheumatoid arthritis      Aneurysm, aorta, thoracic      Skin cancer      HTN (hypertension)      HLD (hyperlipidemia)      ILD (interstitial lung disease)      Wedge compression fracture of unspecified lumbar vertebra, initial encounter for closed fracture      Bilateral lower extremity edema      History of squamous cell carcinoma of skin      Polycythemia      Cardiac murmur      S/P cataract surgery      S/P appendectomy      History of Mohs surgery for squamous cell carcinoma of skin          FAMILY HISTORY:  Family history of cancer of vagina (Mother)    FH: colon cancer (Father)    FH: HTN (hypertension) (Father)       Noncontributory     Social Hx:  Nonsmoker, no drug or alcohol use    Allergies    No Known Allergies    Intolerances        MEDICATIONS  (STANDING):  acetaminophen     Tablet .. 650 milliGRAM(s) Oral once  fluticasone propionate 50 MICROgram(s)/spray Nasal Spray 1 Spray(s) Both Nostrils two times a day  furosemide    Tablet 40 milliGRAM(s) Oral daily  lidocaine   4% Patch 1 Patch Transdermal daily  metoprolol succinate ER 50 milliGRAM(s) Oral daily  pantoprazole  Injectable 40 milliGRAM(s) IV Push every 12 hours  potassium chloride    Tablet ER 20 milliEquivalent(s) Oral daily  rosuvastatin 20 milliGRAM(s) Oral at bedtime  sodium chloride 5% Solution 1 Drop(s) Both EYES four times a day  sucralfate 1 Gram(s) Oral every 6 hours       ROS: Pertinent positives in HPI, all other ROS were reviewed and are negative.      Vital Signs Last 24 Hrs  T(C): 36.9 (21 May 2025 12:21), Max: 36.9 (21 May 2025 12:21)  T(F): 98.5 (21 May 2025 12:21), Max: 98.5 (21 May 2025 12:21)  HR: 61 (21 May 2025 12:21) (54 - 76)  BP: 148/89 (21 May 2025 12:21) (93/71 - 148/89)  BP(mean): --  RR: 19 (21 May 2025 12:21) (17 - 19)  SpO2: 97% (21 May 2025 12:21) (92% - 97%)    Parameters below as of 21 May 2025 12:21  Patient On (Oxygen Delivery Method): room air        Physical Exam:  Constitutional: Awake / alert  HEENT: PERRL, EOMI  Neck: Supple  Respiratory: Respirations non-labored  Cardiovascular: regular rate  Gastrointestinal: Soft, NT/ND  Extremities:  no edema  Musculoskeletal: no abnormal movements, no midline TTP  Skin: No rashes    Neurological Exam:  HF: AA&O x 3, follows commands, normal affect, speech fluent  CN: PERRL, EOMI, no NLFD, tongue midline  Motor: Strength 5/5 in all 4 ext, normal bulk and tone  Sens: Intact to light touch  Reflexes: Symmetric and normal, no clonus, no Marsh's   Coord:  No dysmetria, JANNIE intact   Gait/Balance: Cannot test    Labs:                        8.1    3.49  )-----------( 136      ( 21 May 2025 06:26 )             26.3     05-21    141  |  109[H]  |  21  ----------------------------<  91  3.4[L]   |  28  |  1.22    Ca    8.7      21 May 2025 06:26  Phos  3.1     05-20  Mg     1.8     05-20    TPro  5.6[L]  /  Alb  2.2[L]  /  TBili  0.9  /  DBili  x   /  AST  17  /  ALT  10[L]  /  AlkPhos  97  05-20        PT/INR - ( 19 May 2025 19:48 )   PT: 16.3 sec;   INR: 1.42 ratio         PTT - ( 19 May 2025 19:48 )  PTT:32.0 sec          Radiology:  MR Lumbar Spine No Cont (02.20.25 @ 19:58)  IMPRESSION:  There is a likely subacute compression deformity of the L5 vertebral body   resulting in less than 20% loss of vertebral body height. There is mild   retropulsion of the posterior superior endplate contributing to severe   central canal narrowing at L4-L5.    Multilevel discogenic degenerative disease and facet arthropathy of the   lumbar spine, most pronounced at L4-L5 where there is severe central   canal narrowing and severe bilateral neural foraminal narrowing.   Additional varying degrees of central canal and neural foraminal   narrowing, as above.        CT Abdomen and Pelvis w/wo IV Cont (05.19.25 @ 21:55)   IMPRESSION:  Residual radiopaque enteric contrast significantly limits evaluation for   acute GI bleed, particularly in the colon. Within this limitation no   miguelangel active GI bleed is evident by CT.    Moderate age-indeterminate L5 compression deformity, new compared to 2024    Ectatic ascending aorta measuring up to 4.6 cm (up to 5 cm at the aortic   root).    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

## 2025-05-21 NOTE — CONSULT NOTE ADULT - ASSESSMENT
80yoM with subacute L5 compression fractures and multilevel degenerative stenosis without radiculopathy     Plan:  - No acute neurosurgical interventions indicated at this time   - Should follow outpatient with his surgeon to reschedule kyphoplasty (Patient reports he trusts his surgeon and wants him to do the procedure).   Discussed with Dr. Lopez  80yoM with subacute L5 compression fractures and multilevel degenerative stenosis without radiculopathy     Plan:  - No acute neurosurgical interventions indicated at this time   - LSO brace when upright and OOB   - Should follow outpatient with his surgeon to reschedule kyphoplasty (Patient reports he trusts his surgeon and wants him to do the procedure). Consider transfer to [facility] if unable to ambualte    Discussed with Dr. Lopez  80yoM with subacute L5 compression fractures and multilevel degenerative stenosis without radiculopathy     Plan:  - No acute neurosurgical interventions indicated at this time   - LSO brace when upright and OOB   - Should follow outpatient with his surgeon to reschedule kyphoplasty (Patient reports he trusts his surgeon and wants him to do the procedure). Consider transfer to [facility] if unable to ambulate    Discussed with Dr. Lopez

## 2025-05-21 NOTE — PROGRESS NOTE ADULT - SUBJECTIVE AND OBJECTIVE BOX
HOSPITALIST ATTENDING PROGRESS NOTE    Chart and meds reviewed.      Subjective: Patient seen and examined. Continues to have back pain. H/h stable. No signs of active bleeding. Continue to monitor. unable to walk with PT. Denies bowel or bladder incontinence.       Additional results/Imaging, I have personally reviewed:    LABS:                            8.1    3.49  )-----------( 136      ( 21 May 2025 06:26 )             26.3     05-21    141  |  109[H]  |  21  ----------------------------<  91  3.4[L]   |  28  |  1.22    Ca    8.7      21 May 2025 06:26  Phos  3.1     05-20  Mg     1.8     05-20    TPro  5.6[L]  /  Alb  2.2[L]  /  TBili  0.9  /  DBili  x   /  AST  17  /  ALT  10[L]  /  AlkPhos  97  05-20        LIVER FUNCTIONS - ( 20 May 2025 06:39 )  Alb: 2.2 g/dL / Pro: 5.6 gm/dL / ALK PHOS: 97 U/L / ALT: 10 U/L / AST: 17 U/L / GGT: x           PT/INR - ( 19 May 2025 19:48 )   PT: 16.3 sec;   INR: 1.42 ratio         PTT - ( 19 May 2025 19:48 )  PTT:32.0 sec  Urinalysis Basic - ( 21 May 2025 06:26 )    Color: x / Appearance: x / SG: x / pH: x  Gluc: 91 mg/dL / Ketone: x  / Bili: x / Urobili: x   Blood: x / Protein: x / Nitrite: x   Leuk Esterase: x / RBC: x / WBC x   Sq Epi: x / Non Sq Epi: x / Bacteria: x              All other systems reviewed and found to be negative with the exception of what has been described above.    MEDICATIONS  (STANDING):  acetaminophen     Tablet .. 650 milliGRAM(s) Oral once  fluticasone propionate 50 MICROgram(s)/spray Nasal Spray 1 Spray(s) Both Nostrils two times a day  furosemide    Tablet 40 milliGRAM(s) Oral daily  lidocaine   4% Patch 1 Patch Transdermal daily  metoprolol succinate ER 50 milliGRAM(s) Oral daily  pantoprazole  Injectable 40 milliGRAM(s) IV Push every 12 hours  potassium chloride    Tablet ER 20 milliEquivalent(s) Oral daily  rosuvastatin 20 milliGRAM(s) Oral at bedtime  sodium chloride 5% Solution 1 Drop(s) Both EYES four times a day  sucralfate 1 Gram(s) Oral every 6 hours    MEDICATIONS  (PRN):  acetaminophen     Tablet .. 650 milliGRAM(s) Oral every 6 hours PRN Temp greater or equal to 38C (100.4F), Mild Pain (1 - 3)  aluminum hydroxide/magnesium hydroxide/simethicone Suspension 30 milliLiter(s) Oral every 4 hours PRN Dyspepsia  melatonin 3 milliGRAM(s) Oral at bedtime PRN Insomnia  ondansetron Injectable 4 milliGRAM(s) IV Push every 8 hours PRN Nausea and/or Vomiting  oxycodone    5 mG/acetaminophen 325 mG 1 Tablet(s) Oral every 6 hours PRN Moderate Pain (4 - 6)  oxycodone    5 mG/acetaminophen 325 mG 2 Tablet(s) Oral every 6 hours PRN Severe Pain (7 - 10)      VITALS:  T(F): 98.5 (05-21-25 @ 12:21), Max: 98.5 (05-21-25 @ 12:21)  HR: 61 (05-21-25 @ 12:21) (54 - 76)  BP: 148/89 (05-21-25 @ 12:21) (93/71 - 148/89)  RR: 19 (05-21-25 @ 12:21) (17 - 19)  SpO2: 97% (05-21-25 @ 12:21) (92% - 97%)  Wt(kg): --    I&O's Summary    20 May 2025 07:01  -  21 May 2025 07:00  --------------------------------------------------------  IN: 0 mL / OUT: 300 mL / NET: -300 mL        CAPILLARY BLOOD GLUCOSE          PHYSICAL EXAM:  General:     Well appearing, well nourished in no distress,   Skin: no rash or prominent lesions  Head: normocephalic, atraumatic     Nose: no external lesions, mucosa non-inflamed, septum and turbinates normal  Neck: Supple Thyroid no thyromegaly,  Heart: RRR, no murmur or gallop.  Normal S1, S2.  No S3, S4.   Lungs: CTA bilaterally, no wheezes, rhonchi, rales.  Breathing unlabored.   Chest wall: Normal insp   Abdomen:  Soft, Non distended nontender positive bowel sounds No peritoneal signs.   Back: spine normal without deformity or tenderness.  Normal ROM   Extremities: No deformities, clubbing, cyanosis, or edema.  Musculoskeletal: Normal gait. No decreased range of motion, instability, atrophy or abnormal strength or tone in the head, neck, spine, ribs, pelvis or extremities.   Neurologic: CN 2-12 normal. Sensation to pain, touch and proprioception normal. DTRs normal in upper and lower extremities. No pathologic reflexes.  Motor normal.  Psychiatric: Oriented X3, intact judgement and insight, normal mood and affect.    CULTURES:      Telemetry, personally reviewed

## 2025-05-21 NOTE — CHART NOTE - NSCHARTNOTEFT_GEN_A_CORE
Patient was measured and dispensed a LSO with rigid anterior posterior and lateral panels. The orthosis will stabilize and control the lumbar spine reduce ROM and pain and safely protect the fracture site. Ellis was not feeling up to sitting and donning the brace at this time and would prefer to wait for P.T tomorrow. Care use and function were explained. Contact info was provided. All went without incident.   [facility]  [phone]

## 2025-05-21 NOTE — PROGRESS NOTE ADULT - ASSESSMENT
79 y/o M with PMHx of heart murmur, polycythemia, interstitial lung disease, HLD, HTN, aortic aneurysm, RA, L5 fracture, chronic afib on Eliquis, appendectomy, recent history of perforated peptic ulcer  Patient to be admitted to the hospitalist service for GI bleed.       Anemia, GI bleed  Significant bleeding risk   Holding AC and aspirin at this time  Continue to monitor h/h closely  transfuse hgb <7   Protonix BID   Sucralfate q6  GI consulted , recommendations appreciated   Continue regular diet    Permanent Afib  EP consulted recommendations appreciated   andidate for left atrial appendage occlusion device implant as stated by Dr. King.  as per EP   The option of immediate transition to dual anti-platelet(clopidogrel & aspirin) from apixaban upon implant were reviewed   Dual anti-platelet therapy for 6 months post implant will be followed by aspirin 81 mg indefinitely thereafter.   Plan to follow up with Dr king       HTN/HLD  Continue home metoprolol lasix  Continue home statin     Known L5 Compression fracture  denies bowel bladder incontinence  Continue multimodal pain control   IR consulted - unable to do kyphoplasty here  Continue to work with PT and follow up outpatient  however unable to ambulate for than a few steps   Will consult Neurosurgery, recommendations apprecaited  possible transfer to higher level of care.       Code Status Full Code   Dvt ppx - held         55 minutes spent on total encounter. The necessity of the time spent during the encounter on this date of service was due to:     Time spent coordinating the patient's care. This includes reviewing documentation pertinent to this admission, results and imaging. Further tests, medications, and procedures have been ordered as indicated. Laboratory results and the plan of care were communicated to the patient and family.  Discussed care plan with nursing and will discuss plan and care with appropriate consultant(s).

## 2025-05-22 ENCOUNTER — NON-APPOINTMENT (OUTPATIENT)
Age: 80
End: 2025-05-22

## 2025-05-22 LAB
ANION GAP SERPL CALC-SCNC: 4 MMOL/L — LOW (ref 5–17)
BUN SERPL-MCNC: 19 MG/DL — SIGNIFICANT CHANGE UP (ref 7–23)
CALCIUM SERPL-MCNC: 8.7 MG/DL — SIGNIFICANT CHANGE UP (ref 8.5–10.1)
CHLORIDE SERPL-SCNC: 110 MMOL/L — HIGH (ref 96–108)
CO2 SERPL-SCNC: 29 MMOL/L — SIGNIFICANT CHANGE UP (ref 22–31)
CREAT SERPL-MCNC: 1.26 MG/DL — SIGNIFICANT CHANGE UP (ref 0.5–1.3)
EGFR: 57 ML/MIN/1.73M2 — LOW
EGFR: 57 ML/MIN/1.73M2 — LOW
GLUCOSE SERPL-MCNC: 90 MG/DL — SIGNIFICANT CHANGE UP (ref 70–99)
HCT VFR BLD CALC: 28.7 % — LOW (ref 39–50)
HCT VFR BLD CALC: 28.7 % — LOW (ref 39–50)
HGB BLD-MCNC: 8.6 G/DL — LOW (ref 13–17)
HGB BLD-MCNC: 8.7 G/DL — LOW (ref 13–17)
MCHC RBC-ENTMCNC: 29.9 PG — SIGNIFICANT CHANGE UP (ref 27–34)
MCHC RBC-ENTMCNC: 30 G/DL — LOW (ref 32–36)
MCHC RBC-ENTMCNC: 30 PG — SIGNIFICANT CHANGE UP (ref 27–34)
MCHC RBC-ENTMCNC: 30.3 G/DL — LOW (ref 32–36)
MCV RBC AUTO: 100 FL — SIGNIFICANT CHANGE UP (ref 80–100)
MCV RBC AUTO: 98.6 FL — SIGNIFICANT CHANGE UP (ref 80–100)
NRBC # BLD AUTO: 0 K/UL — SIGNIFICANT CHANGE UP (ref 0–0)
NRBC # BLD AUTO: 0 K/UL — SIGNIFICANT CHANGE UP (ref 0–0)
NRBC # FLD: 0 K/UL — SIGNIFICANT CHANGE UP (ref 0–0)
NRBC # FLD: 0 K/UL — SIGNIFICANT CHANGE UP (ref 0–0)
NRBC BLD AUTO-RTO: 0 /100 WBCS — SIGNIFICANT CHANGE UP (ref 0–0)
NRBC BLD AUTO-RTO: 0 /100 WBCS — SIGNIFICANT CHANGE UP (ref 0–0)
PLATELET # BLD AUTO: 150 K/UL — SIGNIFICANT CHANGE UP (ref 150–400)
PLATELET # BLD AUTO: 152 K/UL — SIGNIFICANT CHANGE UP (ref 150–400)
PMV BLD: 9.7 FL — SIGNIFICANT CHANGE UP (ref 7–13)
PMV BLD: 9.9 FL — SIGNIFICANT CHANGE UP (ref 7–13)
POTASSIUM SERPL-MCNC: 3.9 MMOL/L — SIGNIFICANT CHANGE UP (ref 3.5–5.3)
POTASSIUM SERPL-SCNC: 3.9 MMOL/L — SIGNIFICANT CHANGE UP (ref 3.5–5.3)
RBC # BLD: 2.87 M/UL — LOW (ref 4.2–5.8)
RBC # BLD: 2.91 M/UL — LOW (ref 4.2–5.8)
RBC # FLD: 21.4 % — HIGH (ref 10.3–14.5)
RBC # FLD: 21.5 % — HIGH (ref 10.3–14.5)
SODIUM SERPL-SCNC: 143 MMOL/L — SIGNIFICANT CHANGE UP (ref 135–145)
WBC # BLD: 3.81 K/UL — SIGNIFICANT CHANGE UP (ref 3.8–10.5)
WBC # BLD: 4.06 K/UL — SIGNIFICANT CHANGE UP (ref 3.8–10.5)
WBC # FLD AUTO: 3.81 K/UL — SIGNIFICANT CHANGE UP (ref 3.8–10.5)
WBC # FLD AUTO: 4.06 K/UL — SIGNIFICANT CHANGE UP (ref 3.8–10.5)

## 2025-05-22 PROCEDURE — 99232 SBSQ HOSP IP/OBS MODERATE 35: CPT

## 2025-05-22 RX ORDER — ACETAMINOPHEN 500 MG/5ML
1000 LIQUID (ML) ORAL ONCE
Refills: 0 | Status: COMPLETED | OUTPATIENT
Start: 2025-05-22 | End: 2025-05-22

## 2025-05-22 RX ORDER — CYCLOBENZAPRINE HYDROCHLORIDE 15 MG/1
5 CAPSULE, EXTENDED RELEASE ORAL THREE TIMES A DAY
Refills: 0 | Status: DISCONTINUED | OUTPATIENT
Start: 2025-05-22 | End: 2025-05-26

## 2025-05-22 RX ADMIN — FUROSEMIDE 40 MILLIGRAM(S): 10 INJECTION INTRAMUSCULAR; INTRAVENOUS at 10:18

## 2025-05-22 RX ADMIN — FLUTICASONE PROPIONATE 1 SPRAY(S): 50 SPRAY, METERED NASAL at 10:31

## 2025-05-22 RX ADMIN — Medication 40 MILLIGRAM(S): at 21:41

## 2025-05-22 RX ADMIN — Medication 1 GRAM(S): at 11:31

## 2025-05-22 RX ADMIN — Medication 1 GRAM(S): at 17:47

## 2025-05-22 RX ADMIN — ROSUVASTATIN CALCIUM 20 MILLIGRAM(S): 20 TABLET, FILM COATED ORAL at 21:41

## 2025-05-22 RX ADMIN — METOPROLOL SUCCINATE 50 MILLIGRAM(S): 50 TABLET, EXTENDED RELEASE ORAL at 10:18

## 2025-05-22 RX ADMIN — Medication 1000 MILLIGRAM(S): at 22:45

## 2025-05-22 RX ADMIN — Medication 20 MILLIEQUIVALENT(S): at 10:18

## 2025-05-22 RX ADMIN — Medication 40 MILLIGRAM(S): at 10:19

## 2025-05-22 RX ADMIN — Medication 400 MILLIGRAM(S): at 11:31

## 2025-05-22 RX ADMIN — Medication 400 MILLIGRAM(S): at 22:08

## 2025-05-22 RX ADMIN — Medication 1 GRAM(S): at 23:00

## 2025-05-22 RX ADMIN — FLUTICASONE PROPIONATE 1 SPRAY(S): 50 SPRAY, METERED NASAL at 21:41

## 2025-05-22 NOTE — PROGRESS NOTE ADULT - SUBJECTIVE AND OBJECTIVE BOX
HOSPITALIST ATTENDING PROGRESS NOTE    Chart and meds reviewed.      Subjective: Patient seen and examined. Resting comfortably Denies chest pain shortness of breath. Back pain controlled at rest. Received IV tylenol and was able to work with PT today. Patient interested in getting the watch man here if possible will rediscuss with EP.       Additional results/Imaging, I have personally reviewed:    LABS:                            8.6    3.81  )-----------( 152      ( 22 May 2025 06:27 )             28.7     05-22    143  |  110[H]  |  19  ----------------------------<  90  3.9   |  29  |  1.26    Ca    8.7      22 May 2025 06:27              Urinalysis Basic - ( 22 May 2025 06:27 )    Color: x / Appearance: x / SG: x / pH: x  Gluc: 90 mg/dL / Ketone: x  / Bili: x / Urobili: x   Blood: x / Protein: x / Nitrite: x   Leuk Esterase: x / RBC: x / WBC x   Sq Epi: x / Non Sq Epi: x / Bacteria: x              All other systems reviewed and found to be negative with the exception of what has been described above.    MEDICATIONS  (STANDING):  acetaminophen     Tablet .. 650 milliGRAM(s) Oral once  fluticasone propionate 50 MICROgram(s)/spray Nasal Spray 1 Spray(s) Both Nostrils two times a day  furosemide    Tablet 40 milliGRAM(s) Oral daily  lidocaine   4% Patch 1 Patch Transdermal daily  metoprolol succinate ER 50 milliGRAM(s) Oral daily  pantoprazole  Injectable 40 milliGRAM(s) IV Push every 12 hours  potassium chloride    Tablet ER 20 milliEquivalent(s) Oral daily  rosuvastatin 20 milliGRAM(s) Oral at bedtime  sodium chloride 5% Solution 1 Drop(s) Both EYES four times a day  sucralfate 1 Gram(s) Oral every 6 hours    MEDICATIONS  (PRN):  acetaminophen     Tablet .. 650 milliGRAM(s) Oral every 6 hours PRN Temp greater or equal to 38C (100.4F), Mild Pain (1 - 3)  aluminum hydroxide/magnesium hydroxide/simethicone Suspension 30 milliLiter(s) Oral every 4 hours PRN Dyspepsia  cyclobenzaprine 5 milliGRAM(s) Oral three times a day PRN Muscle Spasm  HYDROmorphone  Injectable 0.2 milliGRAM(s) IV Push once PRN breakthrough pain  melatonin 3 milliGRAM(s) Oral at bedtime PRN Insomnia  ondansetron Injectable 4 milliGRAM(s) IV Push every 8 hours PRN Nausea and/or Vomiting  oxycodone    5 mG/acetaminophen 325 mG 1 Tablet(s) Oral every 6 hours PRN Moderate Pain (4 - 6)  oxycodone    5 mG/acetaminophen 325 mG 2 Tablet(s) Oral every 6 hours PRN Severe Pain (7 - 10)  traMADol 50 milliGRAM(s) Oral two times a day PRN Moderate Pain (4 - 6)      VITALS:  T(F): 97.5 (05-22-25 @ 12:39), Max: 98 (05-21-25 @ 20:30)  HR: 82 (05-22-25 @ 12:39) (51 - 82)  BP: 112/84 (05-22-25 @ 12:39) (112/84 - 136/53)  RR: 18 (05-22-25 @ 12:39) (18 - 20)  SpO2: 97% (05-22-25 @ 12:39) (93% - 100%)  Wt(kg): --    I&O's Summary    22 May 2025 07:01  -  22 May 2025 15:40  --------------------------------------------------------  IN: 590 mL / OUT: 200 mL / NET: 390 mL        CAPILLARY BLOOD GLUCOSE          PHYSICAL EXAM:  General:     Well appearing, well nourished in no distress,   Skin: no rash or prominent lesions  Head: normocephalic, atraumatic     Nose: no external lesions, mucosa non-inflamed, septum and turbinates normal  Neck: Supple Thyroid no thyromegaly,  Heart: RRR, no murmur or gallop.  Normal S1, S2.  No S3, S4.   Lungs: CTA bilaterally, no wheezes, rhonchi, rales.  Breathing unlabored.   Chest wall: Normal insp   Abdomen:  Soft, Non distended nontender positive bowel sounds No peritoneal signs.   Back: spine normal without deformity or tenderness.  Normal ROM   Extremities: No deformities, clubbing, cyanosis, or edema.  Musculoskeletal: Normal gait. No decreased range of motion, instability, atrophy or abnormal strength or tone in the head, neck, spine, ribs, pelvis or extremities.   Neurologic: CN 2-12 normal. Sensation to pain, touch and proprioception normal. DTRs normal in upper and lower extremities. No pathologic reflexes.  Motor normal.  Psychiatric: Oriented X3, intact judgement and insight, normal mood and affect.      CULTURES:      Telemetry, personally reviewed

## 2025-05-23 LAB
ANION GAP SERPL CALC-SCNC: 4 MMOL/L — LOW (ref 5–17)
BUN SERPL-MCNC: 19 MG/DL — SIGNIFICANT CHANGE UP (ref 7–23)
CALCIUM SERPL-MCNC: 8.5 MG/DL — SIGNIFICANT CHANGE UP (ref 8.5–10.1)
CHLORIDE SERPL-SCNC: 110 MMOL/L — HIGH (ref 96–108)
CO2 SERPL-SCNC: 28 MMOL/L — SIGNIFICANT CHANGE UP (ref 22–31)
CREAT SERPL-MCNC: 1.36 MG/DL — HIGH (ref 0.5–1.3)
EGFR: 52 ML/MIN/1.73M2 — LOW
EGFR: 52 ML/MIN/1.73M2 — LOW
GLUCOSE SERPL-MCNC: 90 MG/DL — SIGNIFICANT CHANGE UP (ref 70–99)
HCT VFR BLD CALC: 28.4 % — LOW (ref 39–50)
HGB BLD-MCNC: 8.8 G/DL — LOW (ref 13–17)
MCHC RBC-ENTMCNC: 30.2 PG — SIGNIFICANT CHANGE UP (ref 27–34)
MCHC RBC-ENTMCNC: 31 G/DL — LOW (ref 32–36)
MCV RBC AUTO: 97.6 FL — SIGNIFICANT CHANGE UP (ref 80–100)
NRBC # BLD AUTO: 0 K/UL — SIGNIFICANT CHANGE UP (ref 0–0)
NRBC # FLD: 0 K/UL — SIGNIFICANT CHANGE UP (ref 0–0)
NRBC BLD AUTO-RTO: 0 /100 WBCS — SIGNIFICANT CHANGE UP (ref 0–0)
PLATELET # BLD AUTO: 167 K/UL — SIGNIFICANT CHANGE UP (ref 150–400)
PMV BLD: 9.8 FL — SIGNIFICANT CHANGE UP (ref 7–13)
POTASSIUM SERPL-MCNC: 4 MMOL/L — SIGNIFICANT CHANGE UP (ref 3.5–5.3)
POTASSIUM SERPL-SCNC: 4 MMOL/L — SIGNIFICANT CHANGE UP (ref 3.5–5.3)
RBC # BLD: 2.91 M/UL — LOW (ref 4.2–5.8)
RBC # FLD: 21.2 % — HIGH (ref 10.3–14.5)
SODIUM SERPL-SCNC: 142 MMOL/L — SIGNIFICANT CHANGE UP (ref 135–145)
WBC # BLD: 3.9 K/UL — SIGNIFICANT CHANGE UP (ref 3.8–10.5)
WBC # FLD AUTO: 3.9 K/UL — SIGNIFICANT CHANGE UP (ref 3.8–10.5)

## 2025-05-23 PROCEDURE — 99233 SBSQ HOSP IP/OBS HIGH 50: CPT

## 2025-05-23 RX ORDER — ACETAMINOPHEN 500 MG/5ML
1000 LIQUID (ML) ORAL ONCE
Refills: 0 | Status: COMPLETED | OUTPATIENT
Start: 2025-05-23 | End: 2025-05-23

## 2025-05-23 RX ADMIN — METOPROLOL SUCCINATE 50 MILLIGRAM(S): 50 TABLET, EXTENDED RELEASE ORAL at 09:30

## 2025-05-23 RX ADMIN — Medication 1 GRAM(S): at 11:10

## 2025-05-23 RX ADMIN — CYCLOBENZAPRINE HYDROCHLORIDE 5 MILLIGRAM(S): 15 CAPSULE, EXTENDED RELEASE ORAL at 22:24

## 2025-05-23 RX ADMIN — LIDOCAINE HYDROCHLORIDE 1 PATCH: 20 JELLY TOPICAL at 22:28

## 2025-05-23 RX ADMIN — SODIUM CHLORIDE 1 DROP(S): 50 OINTMENT OPHTHALMIC at 17:07

## 2025-05-23 RX ADMIN — Medication 40 MILLIGRAM(S): at 09:29

## 2025-05-23 RX ADMIN — SODIUM CHLORIDE 1 DROP(S): 50 OINTMENT OPHTHALMIC at 13:38

## 2025-05-23 RX ADMIN — Medication 400 MILLIGRAM(S): at 11:10

## 2025-05-23 RX ADMIN — Medication 1 GRAM(S): at 05:01

## 2025-05-23 RX ADMIN — Medication 20 MILLIEQUIVALENT(S): at 09:30

## 2025-05-23 RX ADMIN — Medication 1 GRAM(S): at 17:07

## 2025-05-23 RX ADMIN — FLUTICASONE PROPIONATE 1 SPRAY(S): 50 SPRAY, METERED NASAL at 22:20

## 2025-05-23 RX ADMIN — LIDOCAINE HYDROCHLORIDE 1 PATCH: 20 JELLY TOPICAL at 09:29

## 2025-05-23 RX ADMIN — ROSUVASTATIN CALCIUM 20 MILLIGRAM(S): 20 TABLET, FILM COATED ORAL at 22:20

## 2025-05-23 RX ADMIN — FUROSEMIDE 40 MILLIGRAM(S): 10 INJECTION INTRAMUSCULAR; INTRAVENOUS at 09:30

## 2025-05-23 RX ADMIN — Medication 1 GRAM(S): at 23:58

## 2025-05-23 RX ADMIN — Medication 75 MILLILITER(S): at 21:51

## 2025-05-23 RX ADMIN — LIDOCAINE HYDROCHLORIDE 1 PATCH: 20 JELLY TOPICAL at 21:00

## 2025-05-23 RX ADMIN — Medication 40 MILLIGRAM(S): at 21:46

## 2025-05-23 NOTE — PROGRESS NOTE ADULT - ASSESSMENT
79 y/o M with PMHx of heart murmur, polycythemia, interstitial lung disease, HLD, HTN, aortic aneurysm, RA, L5 fracture, chronic afib on Eliquis, appendectomy, recent history of perforated peptic ulcer  Patient to be admitted to the hospitalist service for GI bleed.       Anemia, GI bleed  Significant bleeding risk   Holding AC and aspirin at this time since 5/20  Continue to monitor h/h closely - stable   transfuse hgb <7   Protonix BID   Sucralfate q6  GI consulted , recommendations appreciated   Continue regular diet    Permanent Afib  EP consulted recommendations appreciated   andidate for left atrial appendage occlusion device implant as stated by Dr. King.  as per EP   The option of immediate transition to dual anti-platelet(clopidogrel & aspirin) from apixaban upon implant were reviewed   Dual anti-platelet therapy for 6 months post implant will be followed by aspirin 81 mg indefinitely thereafter.   Plan to follow up with Dr king   - patient considering getting the watchman in house if possible.   possible transfer to [facility] if accepted by EP team at [facility],, awaiting call back       HTN/HLD  Continue home metoprolol lasix  Continue home statin     Known L5 Compression fracture  denies bowel bladder incontinence  Continue multimodal pain control   IR consulted - unable to do kyphoplasty here  Continue to work with PT and follow up outpatient  however unable to ambulate for than a few steps   Will consult Neurosurgery, recommendations apprecaited  possible transfer to higher level of care.   Patient able to walk with PT. Continue PT at this time. and follow up with Ortho spine outpatient       Code Status Full Code   Dvt ppx - held         55 minutes spent on total encounter. The necessity of the time spent during the encounter on this date of service was due to:     Time spent coordinating the patient's care. This includes reviewing documentation pertinent to this admission, results and imaging. Further tests, medications, and procedures have been ordered as indicated. Laboratory results and the plan of care were communicated to the patient and family.  Discussed care plan with nursing and will discuss plan and care with appropriate consultant(s).

## 2025-05-23 NOTE — PROGRESS NOTE ADULT - SUBJECTIVE AND OBJECTIVE BOX
HOSPITALIST ATTENDING PROGRESS NOTE    Chart and meds reviewed.      Subjective: Patient seen and examined. Resting comfortably . pain appears to be controlled with IV tylenol, encourage another modality for home solution. Discussed with Dr King cardio  and Dr li ortho spine. Patient will Watchman prior to any procedure. Dr king will look into possible transfer to [facility]       Additional results/Imaging, I have personally reviewed:    LABS:                            8.8    3.90  )-----------( 167      ( 23 May 2025 05:33 )             28.4     05-23    142  |  110[H]  |  19  ----------------------------<  90  4.0   |  28  |  1.36[H]    Ca    8.5      23 May 2025 05:33              Urinalysis Basic - ( 23 May 2025 05:33 )    Color: x / Appearance: x / SG: x / pH: x  Gluc: 90 mg/dL / Ketone: x  / Bili: x / Urobili: x   Blood: x / Protein: x / Nitrite: x   Leuk Esterase: x / RBC: x / WBC x   Sq Epi: x / Non Sq Epi: x / Bacteria: x              All other systems reviewed and found to be negative with the exception of what has been described above.    MEDICATIONS  (STANDING):  acetaminophen     Tablet .. 650 milliGRAM(s) Oral once  fluticasone propionate 50 MICROgram(s)/spray Nasal Spray 1 Spray(s) Both Nostrils two times a day  furosemide    Tablet 40 milliGRAM(s) Oral daily  lidocaine   4% Patch 1 Patch Transdermal daily  metoprolol succinate ER 50 milliGRAM(s) Oral daily  pantoprazole  Injectable 40 milliGRAM(s) IV Push every 12 hours  potassium chloride    Tablet ER 20 milliEquivalent(s) Oral daily  rosuvastatin 20 milliGRAM(s) Oral at bedtime  sodium chloride 0.9%. 1000 milliLiter(s) (75 mL/Hr) IV Continuous <Continuous>  sodium chloride 5% Solution 1 Drop(s) Both EYES four times a day  sucralfate 1 Gram(s) Oral every 6 hours    MEDICATIONS  (PRN):  acetaminophen     Tablet .. 650 milliGRAM(s) Oral every 6 hours PRN Temp greater or equal to 38C (100.4F), Mild Pain (1 - 3)  aluminum hydroxide/magnesium hydroxide/simethicone Suspension 30 milliLiter(s) Oral every 4 hours PRN Dyspepsia  cyclobenzaprine 5 milliGRAM(s) Oral three times a day PRN Muscle Spasm  HYDROmorphone  Injectable 0.2 milliGRAM(s) IV Push once PRN breakthrough pain  melatonin 3 milliGRAM(s) Oral at bedtime PRN Insomnia  ondansetron Injectable 4 milliGRAM(s) IV Push every 8 hours PRN Nausea and/or Vomiting  oxycodone    5 mG/acetaminophen 325 mG 1 Tablet(s) Oral every 6 hours PRN Moderate Pain (4 - 6)  oxycodone    5 mG/acetaminophen 325 mG 2 Tablet(s) Oral every 6 hours PRN Severe Pain (7 - 10)  traMADol 50 milliGRAM(s) Oral two times a day PRN Moderate Pain (4 - 6)      VITALS:  T(F): 97.9 (05-23-25 @ 12:33), Max: 97.9 (05-22-25 @ 16:05)  HR: 77 (05-23-25 @ 12:33) (58 - 87)  BP: 100/57 (05-23-25 @ 12:33) (100/57 - 134/67)  RR: 18 (05-23-25 @ 12:33) (16 - 18)  SpO2: 99% (05-23-25 @ 12:33) (92% - 99%)  Wt(kg): --    I&O's Summary    22 May 2025 07:01  -  23 May 2025 07:00  --------------------------------------------------------  IN: 590 mL / OUT: 200 mL / NET: 390 mL        CAPILLARY BLOOD GLUCOSE          PHYSICAL EXAM:  Gen: No acute distress   HEENT:  pupils equal and reactive, EOMI,   NECK:   supple, no carotid bruits, No JVD  CV:  +S1, +S2, regular rate rhythm, no murmurs or rubs  RESP:   lungs clear to auscultation bilaterally, no wheezing, rales, rhonchi, good air entry bilaterally  GI:  abdomen soft, non-tender, non-distended, normal BS, no bruits, no abdominal masses, no palpable masses  MSK:   normal muscle tone, no atrophy, no rigidity, no contractions  EXT:  no clubbing, no cyanosis, no edema, no calf pain, swelling or erythema  VASCULAR:  pulses equal and symmetric in the upper and lower extremities  NEURO:  AAOX3, no focal neurological deficits, follows all commands, able to move extremities spontaneously  SKIN:  no ulcers, lesions or rashes      CULTURES:      Telemetry, personally reviewed HOSPITALIST ATTENDING PROGRESS NOTE    Chart and meds reviewed.      Subjective: Patient seen and examined. Resting comfortably . pain appears to be controlled with IV tylenol, encourage another modality for home solution. Discussed with Dr King cardio  and Dr li ortho spine. Patient will Watchman prior to any procedure. Dr king will look into possible transfer to [facility]. Patient able to ambulate with PT. IF no transfer, will plan for [facility] discharge and outpatient follow up.       Additional results/Imaging, I have personally reviewed:    LABS:                            8.8    3.90  )-----------( 167      ( 23 May 2025 05:33 )             28.4     05-23    142  |  110[H]  |  19  ----------------------------<  90  4.0   |  28  |  1.36[H]    Ca    8.5      23 May 2025 05:33              Urinalysis Basic - ( 23 May 2025 05:33 )    Color: x / Appearance: x / SG: x / pH: x  Gluc: 90 mg/dL / Ketone: x  / Bili: x / Urobili: x   Blood: x / Protein: x / Nitrite: x   Leuk Esterase: x / RBC: x / WBC x   Sq Epi: x / Non Sq Epi: x / Bacteria: x              All other systems reviewed and found to be negative with the exception of what has been described above.    MEDICATIONS  (STANDING):  acetaminophen     Tablet .. 650 milliGRAM(s) Oral once  fluticasone propionate 50 MICROgram(s)/spray Nasal Spray 1 Spray(s) Both Nostrils two times a day  furosemide    Tablet 40 milliGRAM(s) Oral daily  lidocaine   4% Patch 1 Patch Transdermal daily  metoprolol succinate ER 50 milliGRAM(s) Oral daily  pantoprazole  Injectable 40 milliGRAM(s) IV Push every 12 hours  potassium chloride    Tablet ER 20 milliEquivalent(s) Oral daily  rosuvastatin 20 milliGRAM(s) Oral at bedtime  sodium chloride 0.9%. 1000 milliLiter(s) (75 mL/Hr) IV Continuous <Continuous>  sodium chloride 5% Solution 1 Drop(s) Both EYES four times a day  sucralfate 1 Gram(s) Oral every 6 hours    MEDICATIONS  (PRN):  acetaminophen     Tablet .. 650 milliGRAM(s) Oral every 6 hours PRN Temp greater or equal to 38C (100.4F), Mild Pain (1 - 3)  aluminum hydroxide/magnesium hydroxide/simethicone Suspension 30 milliLiter(s) Oral every 4 hours PRN Dyspepsia  cyclobenzaprine 5 milliGRAM(s) Oral three times a day PRN Muscle Spasm  HYDROmorphone  Injectable 0.2 milliGRAM(s) IV Push once PRN breakthrough pain  melatonin 3 milliGRAM(s) Oral at bedtime PRN Insomnia  ondansetron Injectable 4 milliGRAM(s) IV Push every 8 hours PRN Nausea and/or Vomiting  oxycodone    5 mG/acetaminophen 325 mG 1 Tablet(s) Oral every 6 hours PRN Moderate Pain (4 - 6)  oxycodone    5 mG/acetaminophen 325 mG 2 Tablet(s) Oral every 6 hours PRN Severe Pain (7 - 10)  traMADol 50 milliGRAM(s) Oral two times a day PRN Moderate Pain (4 - 6)      VITALS:  T(F): 97.9 (05-23-25 @ 12:33), Max: 97.9 (05-22-25 @ 16:05)  HR: 77 (05-23-25 @ 12:33) (58 - 87)  BP: 100/57 (05-23-25 @ 12:33) (100/57 - 134/67)  RR: 18 (05-23-25 @ 12:33) (16 - 18)  SpO2: 99% (05-23-25 @ 12:33) (92% - 99%)  Wt(kg): --    I&O's Summary    22 May 2025 07:01  -  23 May 2025 07:00  --------------------------------------------------------  IN: 590 mL / OUT: 200 mL / NET: 390 mL        CAPILLARY BLOOD GLUCOSE          PHYSICAL EXAM:  General:     Well appearing, well nourished in no distress,   Skin: no rash or prominent lesions  Head: normocephalic, atraumatic     Nose: no external lesions, mucosa non-inflamed, septum and turbinates normal  Neck: Supple Thyroid no thyromegaly,  Heart: RRR, no murmur or gallop.  Normal S1, S2.  No S3, S4.   Lungs: CTA bilaterally, no wheezes, rhonchi, rales.  Breathing unlabored.   Chest wall: Normal insp   Abdomen:  Soft, Non distended nontender positive bowel sounds No peritoneal signs.   Back: spine normal without deformity or tenderness.  Normal ROM   Extremities: No deformities, clubbing, cyanosis, or edema.  Musculoskeletal: Normal gait. No decreased range of motion, instability, atrophy or abnormal strength or tone in the head, neck, spine, ribs, pelvis or extremities.   Neurologic: CN 2-12 normal. Sensation to pain, touch and proprioception normal. DTRs normal in upper and lower extremities. No pathologic reflexes.  Motor normal.  Psychiatric: Oriented X3, intact judgement and insight, normal mood and affect.    CULTURES:      Telemetry, personally reviewed

## 2025-05-24 LAB
ANION GAP SERPL CALC-SCNC: 4 MMOL/L — LOW (ref 5–17)
BUN SERPL-MCNC: 19 MG/DL — SIGNIFICANT CHANGE UP (ref 7–23)
CALCIUM SERPL-MCNC: 8.5 MG/DL — SIGNIFICANT CHANGE UP (ref 8.5–10.1)
CHLORIDE SERPL-SCNC: 110 MMOL/L — HIGH (ref 96–108)
CO2 SERPL-SCNC: 27 MMOL/L — SIGNIFICANT CHANGE UP (ref 22–31)
CREAT SERPL-MCNC: 1.24 MG/DL — SIGNIFICANT CHANGE UP (ref 0.5–1.3)
EGFR: 58 ML/MIN/1.73M2 — LOW
EGFR: 58 ML/MIN/1.73M2 — LOW
GLUCOSE SERPL-MCNC: 88 MG/DL — SIGNIFICANT CHANGE UP (ref 70–99)
HCT VFR BLD CALC: 29.4 % — LOW (ref 39–50)
HGB BLD-MCNC: 8.8 G/DL — LOW (ref 13–17)
MCHC RBC-ENTMCNC: 29.7 PG — SIGNIFICANT CHANGE UP (ref 27–34)
MCHC RBC-ENTMCNC: 29.9 G/DL — LOW (ref 32–36)
MCV RBC AUTO: 99.3 FL — SIGNIFICANT CHANGE UP (ref 80–100)
NRBC # BLD AUTO: 0 K/UL — SIGNIFICANT CHANGE UP (ref 0–0)
NRBC # FLD: 0 K/UL — SIGNIFICANT CHANGE UP (ref 0–0)
NRBC BLD AUTO-RTO: 0 /100 WBCS — SIGNIFICANT CHANGE UP (ref 0–0)
PLATELET # BLD AUTO: 159 K/UL — SIGNIFICANT CHANGE UP (ref 150–400)
PMV BLD: 9.9 FL — SIGNIFICANT CHANGE UP (ref 7–13)
POTASSIUM SERPL-MCNC: 4 MMOL/L — SIGNIFICANT CHANGE UP (ref 3.5–5.3)
POTASSIUM SERPL-SCNC: 4 MMOL/L — SIGNIFICANT CHANGE UP (ref 3.5–5.3)
RBC # BLD: 2.96 M/UL — LOW (ref 4.2–5.8)
RBC # FLD: 20.2 % — HIGH (ref 10.3–14.5)
SODIUM SERPL-SCNC: 141 MMOL/L — SIGNIFICANT CHANGE UP (ref 135–145)
WBC # BLD: 4.32 K/UL — SIGNIFICANT CHANGE UP (ref 3.8–10.5)
WBC # FLD AUTO: 4.32 K/UL — SIGNIFICANT CHANGE UP (ref 3.8–10.5)

## 2025-05-24 PROCEDURE — 99232 SBSQ HOSP IP/OBS MODERATE 35: CPT

## 2025-05-24 RX ORDER — APIXABAN 2.5 MG/1
2.5 TABLET, FILM COATED ORAL
Refills: 0 | Status: DISCONTINUED | OUTPATIENT
Start: 2025-05-24 | End: 2025-05-26

## 2025-05-24 RX ADMIN — Medication 1 GRAM(S): at 16:44

## 2025-05-24 RX ADMIN — Medication 1 GRAM(S): at 11:04

## 2025-05-24 RX ADMIN — Medication 40 MILLIGRAM(S): at 09:07

## 2025-05-24 RX ADMIN — SODIUM CHLORIDE 1 DROP(S): 50 OINTMENT OPHTHALMIC at 16:44

## 2025-05-24 RX ADMIN — Medication 20 MILLIEQUIVALENT(S): at 09:10

## 2025-05-24 RX ADMIN — Medication 1 GRAM(S): at 05:11

## 2025-05-24 RX ADMIN — APIXABAN 2.5 MILLIGRAM(S): 2.5 TABLET, FILM COATED ORAL at 21:13

## 2025-05-24 RX ADMIN — FUROSEMIDE 40 MILLIGRAM(S): 10 INJECTION INTRAMUSCULAR; INTRAVENOUS at 09:09

## 2025-05-24 RX ADMIN — SODIUM CHLORIDE 1 DROP(S): 50 OINTMENT OPHTHALMIC at 05:12

## 2025-05-24 RX ADMIN — Medication 1 GRAM(S): at 23:09

## 2025-05-24 RX ADMIN — Medication 75 MILLILITER(S): at 09:07

## 2025-05-24 RX ADMIN — APIXABAN 2.5 MILLIGRAM(S): 2.5 TABLET, FILM COATED ORAL at 09:09

## 2025-05-24 RX ADMIN — METOPROLOL SUCCINATE 50 MILLIGRAM(S): 50 TABLET, EXTENDED RELEASE ORAL at 09:09

## 2025-05-24 RX ADMIN — LIDOCAINE HYDROCHLORIDE 1 PATCH: 20 JELLY TOPICAL at 09:11

## 2025-05-24 RX ADMIN — ROSUVASTATIN CALCIUM 20 MILLIGRAM(S): 20 TABLET, FILM COATED ORAL at 21:13

## 2025-05-24 RX ADMIN — Medication 40 MILLIGRAM(S): at 21:13

## 2025-05-24 RX ADMIN — SODIUM CHLORIDE 1 DROP(S): 50 OINTMENT OPHTHALMIC at 23:12

## 2025-05-24 RX ADMIN — FLUTICASONE PROPIONATE 1 SPRAY(S): 50 SPRAY, METERED NASAL at 09:15

## 2025-05-24 RX ADMIN — FLUTICASONE PROPIONATE 1 SPRAY(S): 50 SPRAY, METERED NASAL at 21:13

## 2025-05-24 NOTE — PROGRESS NOTE ADULT - SUBJECTIVE AND OBJECTIVE BOX
HOSPITALIST ATTENDING PROGRESS NOTE    Chart and meds reviewed.      HPI: 5/21/25    79 y/o M with PMHx of heart murmur, polycythemia, interstitial lung disease, HLD, HTN, aortic aneurysm, RA, L5 fracture, chronic afib on Eliquis, appendectomy, recent history of perforated peptic ulcer [place] [facility], required multiple blood transfusions, was also found to have duodenal bleeding which was clipped, post op infection requiring IV abx and drains, discharged to [facility] for rehab, presented to the ED after getting routine blood work that showed a hemoglobin of 6.6. Patient was sent to the ED from facility. patient denies any signs of active bleeding. denies bright red blood or black stools. Denies chest pain shortness of breath fevers chills nausea vomiting diarrhea, in the ed patient with /66, T 97.8 HR 68 RR 18 SPO2 96% on Room Air. Patient reports chronic back pain from know L5 fracture. Was scheduled for kyphoplasty prior to recent hospitalization for GI bleed.  Patient found to have hgb 7.1 in the ED and received 1unit of pRBC. Patient to be admitted to the hospitalist service for GI bleed.     Subjective: Patient seen and examined. Resting comfortably. Pain controlled. Reports continued fatigue, moreso than yesterday. Pain controlled at this time at rest.  Cr improved. will monitor off IV Fluids.  Yesterday Discussed with Dr King (cardio)  and Dr li (ortho spine). Patient will need Watchman prior to any procedure. Dr king will look into possible transfer to [facility]. Patient ambulating with PT with prior IV tylenol use. Will need outpatient pain control prior to discharge.         Additional results/Imaging, I have personally reviewed:    LABS:                            8.8    4.32  )-----------( 159      ( 24 May 2025 05:51 )             29.4     05-24    141  |  110[H]  |  19  ----------------------------<  88  4.0   |  27  |  1.24    Ca    8.5      24 May 2025 05:51              Urinalysis Basic - ( 24 May 2025 05:51 )    Color: x / Appearance: x / SG: x / pH: x  Gluc: 88 mg/dL / Ketone: x  / Bili: x / Urobili: x   Blood: x / Protein: x / Nitrite: x   Leuk Esterase: x / RBC: x / WBC x   Sq Epi: x / Non Sq Epi: x / Bacteria: x              All other systems reviewed and found to be negative with the exception of what has been described above.    MEDICATIONS  (STANDING):  acetaminophen     Tablet .. 650 milliGRAM(s) Oral once  apixaban 2.5 milliGRAM(s) Oral two times a day  fluticasone propionate 50 MICROgram(s)/spray Nasal Spray 1 Spray(s) Both Nostrils two times a day  furosemide    Tablet 40 milliGRAM(s) Oral daily  lidocaine   4% Patch 1 Patch Transdermal daily  metoprolol succinate ER 50 milliGRAM(s) Oral daily  pantoprazole  Injectable 40 milliGRAM(s) IV Push every 12 hours  potassium chloride    Tablet ER 20 milliEquivalent(s) Oral daily  rosuvastatin 20 milliGRAM(s) Oral at bedtime  sodium chloride 5% Solution 1 Drop(s) Both EYES four times a day  sucralfate 1 Gram(s) Oral every 6 hours    MEDICATIONS  (PRN):  acetaminophen     Tablet .. 650 milliGRAM(s) Oral every 6 hours PRN Temp greater or equal to 38C (100.4F), Mild Pain (1 - 3)  aluminum hydroxide/magnesium hydroxide/simethicone Suspension 30 milliLiter(s) Oral every 4 hours PRN Dyspepsia  cyclobenzaprine 5 milliGRAM(s) Oral three times a day PRN Muscle Spasm  HYDROmorphone  Injectable 0.2 milliGRAM(s) IV Push once PRN breakthrough pain  melatonin 3 milliGRAM(s) Oral at bedtime PRN Insomnia  ondansetron Injectable 4 milliGRAM(s) IV Push every 8 hours PRN Nausea and/or Vomiting  oxycodone    5 mG/acetaminophen 325 mG 1 Tablet(s) Oral every 6 hours PRN Moderate Pain (4 - 6)  oxycodone    5 mG/acetaminophen 325 mG 2 Tablet(s) Oral every 6 hours PRN Severe Pain (7 - 10)  traMADol 50 milliGRAM(s) Oral two times a day PRN Moderate Pain (4 - 6)      VITALS:  T(F): 97.5 (05-24-25 @ 12:15), Max: 97.9 (05-23-25 @ 20:32)  HR: 52 (05-24-25 @ 12:15) (52 - 85)  BP: 131/71 (05-24-25 @ 12:15) (104/61 - 142/54)  RR: 18 (05-24-25 @ 12:15) (16 - 18)  SpO2: 98% (05-24-25 @ 12:15) (94% - 98%)  Wt(kg): --    I&O's Summary    23 May 2025 07:01  -  24 May 2025 07:00  --------------------------------------------------------  IN: 0 mL / OUT: 400 mL / NET: -400 mL        CAPILLARY BLOOD GLUCOSE          PHYSICAL EXAM:  General:     Well appearing, well nourished in no distress,   Skin: no rash or prominent lesions  Head: normocephalic, atraumatic     Nose: no external lesions, mucosa non-inflamed, septum and turbinates normal  Neck: Supple Thyroid no thyromegaly,  Heart: RRR, no murmur or gallop.  Normal S1, S2.  No S3, S4.   Lungs: CTA bilaterally, no wheezes, rhonchi, rales.  Breathing unlabored.   Chest wall: Normal insp   Abdomen:  Soft, Non distended nontender positive bowel sounds No peritoneal signs.   Back: spine normal without deformity or tenderness.  Normal ROM   Extremities: No deformities, clubbing, cyanosis, or edema.  Musculoskeletal: Normal gait. No decreased range of motion, instability, atrophy or abnormal strength or tone in the head, neck, spine, ribs, pelvis or extremities.   Neurologic: CN 2-12 normal. Sensation to pain, touch and proprioception normal. DTRs normal in upper and lower extremities. No pathologic reflexes.  Motor normal.  Psychiatric: Oriented X3, intact judgement and insight, normal mood and affect.    CULTURES:      Telemetry, personally reviewed HOSPITALIST ATTENDING PROGRESS NOTE    Chart and meds reviewed.      HPI: 5/21/25    79 y/o M with PMHx of heart murmur, polycythemia, interstitial lung disease, HLD, HTN, aortic aneurysm, RA, L5 fracture, chronic afib on Eliquis, appendectomy, recent history of perforated peptic ulcer admitted at [facility] [place], required multiple blood transfusions, was also found to have duodenal bleeding which was clipped, post op infection requiring IV abx and drains, discharged to [facility] for rehab, presented to the ED after getting routine blood work that showed a hemoglobin of 6.6. Patient was sent to the ED from facility. patient denies any signs of active bleeding. denies bright red blood or black stools. Denies chest pain shortness of breath fevers chills nausea vomiting diarrhea, in the ed patient with /66, T 97.8 HR 68 RR 18 SPO2 96% on Room Air. Patient reports chronic back pain from know L5 fracture. Was scheduled for kyphoplasty prior to recent hospitalization for GI bleed.  Patient found to have hgb 7.1 in the ED and received 1unit of pRBC. Patient to be admitted to the hospitalist service for GI bleed.     Subjective: Patient seen and examined. Resting comfortably. Pain controlled. Reports continued fatigue, moreso than yesterday. Pain controlled at this time at rest.  Cr improved. will monitor off IV Fluids.  Yesterday Discussed with Dr King (cardio)  and Dr li (ortho spine). Patient will need Watchman prior to any procedure. Dr king will look into possible transfer to [facility]. Patient ambulating with PT with prior IV tylenol use. Will need outpatient pain control prior to discharge.         Additional results/Imaging, I have personally reviewed:    LABS:                            8.8    4.32  )-----------( 159      ( 24 May 2025 05:51 )             29.4     05-24    141  |  110[H]  |  19  ----------------------------<  88  4.0   |  27  |  1.24    Ca    8.5      24 May 2025 05:51              Urinalysis Basic - ( 24 May 2025 05:51 )    Color: x / Appearance: x / SG: x / pH: x  Gluc: 88 mg/dL / Ketone: x  / Bili: x / Urobili: x   Blood: x / Protein: x / Nitrite: x   Leuk Esterase: x / RBC: x / WBC x   Sq Epi: x / Non Sq Epi: x / Bacteria: x              All other systems reviewed and found to be negative with the exception of what has been described above.    MEDICATIONS  (STANDING):  acetaminophen     Tablet .. 650 milliGRAM(s) Oral once  apixaban 2.5 milliGRAM(s) Oral two times a day  fluticasone propionate 50 MICROgram(s)/spray Nasal Spray 1 Spray(s) Both Nostrils two times a day  furosemide    Tablet 40 milliGRAM(s) Oral daily  lidocaine   4% Patch 1 Patch Transdermal daily  metoprolol succinate ER 50 milliGRAM(s) Oral daily  pantoprazole  Injectable 40 milliGRAM(s) IV Push every 12 hours  potassium chloride    Tablet ER 20 milliEquivalent(s) Oral daily  rosuvastatin 20 milliGRAM(s) Oral at bedtime  sodium chloride 5% Solution 1 Drop(s) Both EYES four times a day  sucralfate 1 Gram(s) Oral every 6 hours    MEDICATIONS  (PRN):  acetaminophen     Tablet .. 650 milliGRAM(s) Oral every 6 hours PRN Temp greater or equal to 38C (100.4F), Mild Pain (1 - 3)  aluminum hydroxide/magnesium hydroxide/simethicone Suspension 30 milliLiter(s) Oral every 4 hours PRN Dyspepsia  cyclobenzaprine 5 milliGRAM(s) Oral three times a day PRN Muscle Spasm  HYDROmorphone  Injectable 0.2 milliGRAM(s) IV Push once PRN breakthrough pain  melatonin 3 milliGRAM(s) Oral at bedtime PRN Insomnia  ondansetron Injectable 4 milliGRAM(s) IV Push every 8 hours PRN Nausea and/or Vomiting  oxycodone    5 mG/acetaminophen 325 mG 1 Tablet(s) Oral every 6 hours PRN Moderate Pain (4 - 6)  oxycodone    5 mG/acetaminophen 325 mG 2 Tablet(s) Oral every 6 hours PRN Severe Pain (7 - 10)  traMADol 50 milliGRAM(s) Oral two times a day PRN Moderate Pain (4 - 6)      VITALS:  T(F): 97.5 (05-24-25 @ 12:15), Max: 97.9 (05-23-25 @ 20:32)  HR: 52 (05-24-25 @ 12:15) (52 - 85)  BP: 131/71 (05-24-25 @ 12:15) (104/61 - 142/54)  RR: 18 (05-24-25 @ 12:15) (16 - 18)  SpO2: 98% (05-24-25 @ 12:15) (94% - 98%)  Wt(kg): --    I&O's Summary    23 May 2025 07:01  -  24 May 2025 07:00  --------------------------------------------------------  IN: 0 mL / OUT: 400 mL / NET: -400 mL        CAPILLARY BLOOD GLUCOSE          PHYSICAL EXAM:  General:     Well appearing, well nourished in no distress,   Skin: no rash or prominent lesions  Head: normocephalic, atraumatic   chronic left eye droop  Nose: no external lesions, mucosa non-inflamed, septum and turbinates normal  Neck: Supple Thyroid no thyromegaly,  Heart: RRR, no murmur or gallop.  Normal S1, S2.  No S3, S4.   Lungs: CTA bilaterally, no wheezes, rhonchi, rales.  Breathing unlabored.   Chest wall: Normal insp   Abdomen:  Soft, Non distended nontender positive bowel sounds No peritoneal signs.   Back: spine normal without deformity or tenderness.  Normal ROM   Extremities: No deformities, clubbing, cyanosis, or edema.  Musculoskeletal: Normal gait. No decreased range of motion, instability, atrophy or abnormal strength or tone in the head, neck, spine, ribs, pelvis or extremities.   Neurologic: CN 2-12 normal. Sensation to pain, touch and proprioception normal. DTRs normal in upper and lower extremities. No pathologic reflexes.  Motor normal.  Psychiatric: Oriented X3, intact judgement and insight, normal mood and affect.    CULTURES:      Telemetry, personally reviewed HOSPITALIST ATTENDING PROGRESS NOTE    Chart and meds reviewed.      HPI: 5/21/25    79 y/o M with PMHx of heart murmur, polycythemia, interstitial lung disease, HLD, HTN, aortic aneurysm, RA, L5 fracture, chronic afib on Eliquis, appendectomy, recent history of perforated peptic ulcer admitted at [facility] [place], required multiple blood transfusions, was also found to have duodenal bleeding which was clipped, post op infection requiring IV abx and drains, discharged to [facility] for rehab, presented to the ED after getting routine blood work that showed a hemoglobin of 6.6. Patient was sent to the ED from facility. patient denies any signs of active bleeding. denies bright red blood or black stools. Denies chest pain shortness of breath fevers chills nausea vomiting diarrhea, in the ed patient with /66, T 97.8 HR 68 RR 18 SPO2 96% on Room Air. Patient reports chronic back pain from know L5 fracture. Was scheduled for kyphoplasty prior to recent hospitalization for GI bleed.  Patient found to have hgb 7.1 in the ED and received 1unit of pRBC. Patient to be admitted to the hospitalist service for GI bleed.     Subjective: Patient seen and examined. Resting comfortably. Pain controlled. Reports continued fatigue, moreso than yesterday. Pain controlled at this time at rest.  Cr improved. will monitor off IV Fluids.  Yesterday Discussed with Dr King (cardio)  and Dr li (ortho spine). Patient will need Watchman prior to any procedure. Dr king will look into possible transfer to [facility]. Patient ambulating with PT with prior IV tylenol use. Will need outpatient pain control prior to discharge.         Additional results/Imaging, I have personally reviewed:    LABS:                            8.8    4.32  )-----------( 159      ( 24 May 2025 05:51 )             29.4     05-24    141  |  110[H]  |  19  ----------------------------<  88  4.0   |  27  |  1.24    Ca    8.5      24 May 2025 05:51              Urinalysis Basic - ( 24 May 2025 05:51 )    Color: x / Appearance: x / SG: x / pH: x  Gluc: 88 mg/dL / Ketone: x  / Bili: x / Urobili: x   Blood: x / Protein: x / Nitrite: x   Leuk Esterase: x / RBC: x / WBC x   Sq Epi: x / Non Sq Epi: x / Bacteria: x              All other systems reviewed and found to be negative with the exception of what has been described above.    MEDICATIONS  (STANDING):  acetaminophen     Tablet .. 650 milliGRAM(s) Oral once  apixaban 2.5 milliGRAM(s) Oral two times a day  fluticasone propionate 50 MICROgram(s)/spray Nasal Spray 1 Spray(s) Both Nostrils two times a day  furosemide    Tablet 40 milliGRAM(s) Oral daily  lidocaine   4% Patch 1 Patch Transdermal daily  metoprolol succinate ER 50 milliGRAM(s) Oral daily  pantoprazole  Injectable 40 milliGRAM(s) IV Push every 12 hours  potassium chloride    Tablet ER 20 milliEquivalent(s) Oral daily  rosuvastatin 20 milliGRAM(s) Oral at bedtime  sodium chloride 5% Solution 1 Drop(s) Both EYES four times a day  sucralfate 1 Gram(s) Oral every 6 hours    MEDICATIONS  (PRN):  acetaminophen     Tablet .. 650 milliGRAM(s) Oral every 6 hours PRN Temp greater or equal to 38C (100.4F), Mild Pain (1 - 3)  aluminum hydroxide/magnesium hydroxide/simethicone Suspension 30 milliLiter(s) Oral every 4 hours PRN Dyspepsia  cyclobenzaprine 5 milliGRAM(s) Oral three times a day PRN Muscle Spasm  HYDROmorphone  Injectable 0.2 milliGRAM(s) IV Push once PRN breakthrough pain  melatonin 3 milliGRAM(s) Oral at bedtime PRN Insomnia  ondansetron Injectable 4 milliGRAM(s) IV Push every 8 hours PRN Nausea and/or Vomiting  oxycodone    5 mG/acetaminophen 325 mG 1 Tablet(s) Oral every 6 hours PRN Moderate Pain (4 - 6)  oxycodone    5 mG/acetaminophen 325 mG 2 Tablet(s) Oral every 6 hours PRN Severe Pain (7 - 10)  traMADol 50 milliGRAM(s) Oral two times a day PRN Moderate Pain (4 - 6)      VITALS:  T(F): 97.5 (05-24-25 @ 12:15), Max: 97.9 (05-23-25 @ 20:32)  HR: 52 (05-24-25 @ 12:15) (52 - 85)  BP: 131/71 (05-24-25 @ 12:15) (104/61 - 142/54)  RR: 18 (05-24-25 @ 12:15) (16 - 18)  SpO2: 98% (05-24-25 @ 12:15) (94% - 98%)  Wt(kg): --    I&O's Summary    23 May 2025 07:01  -  24 May 2025 07:00  --------------------------------------------------------  IN: 0 mL / OUT: 400 mL / NET: -400 mL        CAPILLARY BLOOD GLUCOSE          PHYSICAL EXAM:  General:     Well appearing, well nourished in no distress,   Skin: no rash or prominent lesions  Head: normocephalic, atraumatic   left eye irritation,   Nose: no external lesions, mucosa non-inflamed, septum and turbinates normal  Neck: Supple Thyroid no thyromegaly,  Heart: RRR, no murmur or gallop.  Normal S1, S2.  No S3, S4.   Lungs: CTA bilaterally, no wheezes, rhonchi, rales.  Breathing unlabored.   Chest wall: Normal insp   Abdomen:  Soft, Non distended nontender positive bowel sounds No peritoneal signs.   Back: spine normal without deformity or tenderness.  Normal ROM   Extremities: No deformities, clubbing, cyanosis, or edema.  Musculoskeletal: Normal gait. No decreased range of motion, instability, atrophy or abnormal strength or tone in the head, neck, spine, ribs, pelvis or extremities.   Neurologic: CN 2-12 normal. Sensation to pain, touch and proprioception normal. DTRs normal in upper and lower extremities. No pathologic reflexes.  Motor normal.  Psychiatric: Oriented X3, intact judgement and insight, normal mood and affect.    CULTURES:      Telemetry, personally reviewed

## 2025-05-24 NOTE — PROGRESS NOTE ADULT - ASSESSMENT
81 y/o M with PMHx of heart murmur, polycythemia, interstitial lung disease, HLD, HTN, aortic aneurysm, RA, L5 fracture, chronic afib on Eliquis, appendectomy, recent history of perforated peptic ulcer  Patient to be admitted to the hospitalist service for GI bleed.       Anemia, GI bleed  Significant bleeding risk   Held AC and aspirin at this time since 5/20  -low dose eliquis resumed 5/23 - h/h stable   Continue to monitor h/h closely - stable  transfuse hgb <7   Protonix BID   Sucralfate q6  GI consulted , recommendations appreciated   as per GI  Patient without any miguelangel/overt GI bleeding and CTA negative.   Stable h/h after transfusion and no overt bleeding here.   Very chroniclly ill with multiple medical comorbidities including ILD. At this point would not pursue diagnostic procedures without evidence of new overt/active Gi bleeding.   Continue all meds and give BID PPI for at least 8 weeks then once daily for life.   Best if patient goes to [facility] facilities where he had his surgery/EGD's from now on despite [facility]'s proximity to our hospital.  Continue regular diet    Permanent Afib  EP consulted recommendations appreciated   Candidate for left atrial appendage occlusion device implant as stated by Dr. King.  as per EP   The option of immediate transition to dual anti-platelet(clopidogrel & aspirin) from apixaban upon implant were reviewed   Dual anti-platelet therapy for 6 months post implant will be followed by aspirin 81 mg indefinitely thereafter.   Plan to follow up with Dr king - He is inquiring about possibility of inpatient transfer   - patient considering getting the watchman in house if possible.   possible transfer to [facility] if accepted by EP team at [facility],, awaiting call back       HTN/HLD  Continue home metoprolol lasix  Continue home statin     Known L5 Compression fracture  denies bowel bladder incontinence  Continue multimodal pain control   IR consulted - unable to do kyphoplasty here  Continue to work with PT  Will consult Neurosurgery, recommendations appreciated  Will need watch prior to kyphoplasty   Home ortho spine Dr. li         Code Status Full Code   Dvt ppx - eliquis     Dispo  Transfer to [facility] vs [facility] and outpatient follow up         55 minutes spent on total encounter. The necessity of the time spent during the encounter on this date of service was due to:     Time spent coordinating the patient's care. This includes reviewing documentation pertinent to this admission, results and imaging. Further tests, medications, and procedures have been ordered as indicated. Laboratory results and the plan of care were communicated to the patient and family.  Discussed care plan with nursing and will discuss plan and care with appropriate consultant(s).   79 y/o M with PMHx of heart murmur, polycythemia, interstitial lung disease, HLD, HTN, aortic aneurysm, RA, L5 fracture, chronic afib on Eliquis, appendectomy, recent history of perforated peptic ulcer  Patient to be admitted to the hospitalist service for GI bleed.       Anemia, GI bleed  Significant bleeding risk   Held AC and aspirin 5/20  -low dose eliquis resumed 5/23 - h/h stable   Continue to monitor h/h closely - stable  transfuse hgb <7   Protonix BID   Sucralfate q6  GI consulted , recommendations appreciated   as per GI  Patient without any miguelangel/overt GI bleeding and CTA negative.   Stable h/h after transfusion and no overt bleeding here.   Very chroniclly ill with multiple medical comorbidities including ILD. At this point would not pursue diagnostic procedures without evidence of new overt/active Gi bleeding.   Continue all meds and give BID PPI for at least 8 weeks then once daily for life.   Best if patient goes to [facility] facilities where he had his surgery/EGD's from now on despite [facility]'s proximity to our hospital.  Continue regular diet    Permanent Afib  EP consulted recommendations appreciated   Candidate for left atrial appendage occlusion device implant as stated by Dr. King.  as per EP   The option of immediate transition to dual anti-platelet(clopidogrel & aspirin) from apixaban upon implant were reviewed   Dual anti-platelet therapy for 6 months post implant will be followed by aspirin 81 mg indefinitely thereafter.   Plan to follow up with Dr king - He is inquiring about possibility of inpatient transfer   - patient considering getting the watchman in house if possible.   possible transfer to [facility] if accepted by EP team at [facility],, awaiting call back     mild PRABHA  received gentle IV Fluids   now improved   Continue to monitor  appears euvolemic  holding home lasix due to prabha, can restart as indicated       HTN/HLD  Continue home metoprolol   Continue home statin   lasix held 5/23 due to prabha     Known L5 Compression fracture  denies bowel bladder incontinence  Continue multimodal pain control   IR consulted - unable to do kyphoplasty here  Continue to work with PT  Will consult Neurosurgery, recommendations appreciated  Will need watch prior to kyphoplasty   Home ortho spine Dr. li     Code Status Full Code   Dvt ppx - eliquis     Dispo  Transfer to [facility] vs [facility] and outpatient follow up         55 minutes spent on total encounter. The necessity of the time spent during the encounter on this date of service was due to:     Time spent coordinating the patient's care. This includes reviewing documentation pertinent to this admission, results and imaging. Further tests, medications, and procedures have been ordered as indicated. Laboratory results and the plan of care were communicated to the patient and family.  Discussed care plan with nursing and will discuss plan and care with appropriate consultant(s).   79 y/o M with PMHx of heart murmur, polycythemia, interstitial lung disease, HLD, HTN, aortic aneurysm, RA, L5 fracture, chronic afib on Eliquis, appendectomy, recent history of perforated peptic ulcer  Patient to be admitted to the hospitalist service for GI bleed.       Anemia, GI bleed  Significant bleeding risk   S/p 1 unit of pRBC on admission -  Held AC and aspirin 5/20  -low dose eliquis resumed 5/23 - h/h stable   Continue to monitor h/h closely - stable  transfuse hgb <7   Protonix BID   Sucralfate q6  GI consulted , recommendations appreciated   as per GI  Patient without any miguelangel/overt GI bleeding and CTA negative.   Stable h/h after transfusion and no overt bleeding here.   Very chroniclly ill with multiple medical comorbidities including ILD. At this point would not pursue diagnostic procedures without evidence of new overt/active Gi bleeding.   Continue all meds and give BID PPI for at least 8 weeks then once daily for life.   Best if patient goes to [facility] facilities where he had his surgery/EGD's from now on despite [facility]'s proximity to our hospital.  Continue regular diet    Permanent Afib  EP consulted recommendations appreciated   Candidate for left atrial appendage occlusion device implant as stated by Dr. King.  as per EP   The option of immediate transition to dual anti-platelet(clopidogrel & aspirin) from apixaban upon implant were reviewed   Dual anti-platelet therapy for 6 months post implant will be followed by aspirin 81 mg indefinitely thereafter.   Plan to follow up with Dr king - He is inquiring about possibility of inpatient transfer   - patient considering getting the watchman in house if possible.   possible transfer to [facility] if accepted by EP team at [facility],, awaiting call back     mild PRABHA  received gentle IV Fluids   now improved   Continue to monitor  appears euvolemic  holding home lasix due to prabha, can restart as indicated       HTN/HLD  Continue home metoprolol   Continue home statin   lasix held 5/23 due to prabha     Known L5 Compression fracture  denies bowel bladder incontinence  Continue multimodal pain control   IR consulted - unable to do kyphoplasty here  Continue to work with PT  Will consult Neurosurgery, recommendations appreciated  Will need watch prior to kyphoplasty   Home ortho spine Dr. li     hx left eye edema  Continue home jasmin and loteprednol   patient to bring in loteprednol     Code Status Full Code   Dvt ppx - eliquis     Dispo  Transfer to [facility] vs [facility] and outpatient follow up         55 minutes spent on total encounter. The necessity of the time spent during the encounter on this date of service was due to:     Time spent coordinating the patient's care. This includes reviewing documentation pertinent to this admission, results and imaging. Further tests, medications, and procedures have been ordered as indicated. Laboratory results and the plan of care were communicated to the patient and family.  Discussed care plan with nursing and will discuss plan and care with appropriate consultant(s).   79 y/o M with PMHx of heart murmur, polycythemia, interstitial lung disease, HLD, HTN, aortic aneurysm, RA, L5 fracture, chronic afib on Eliquis, appendectomy, recent history of perforated peptic ulcer  Patient to be admitted to the hospitalist service for GI bleed.       Anemia, GI bleed  Significant bleeding risk   S/p 1 unit of pRBC on admission -  Held AC and aspirin 5/20  -low dose eliquis resumed 5/23 - h/h stable   Continue to monitor h/h closely - stable  transfuse hgb <7   Protonix BID   Sucralfate q6  GI consulted , recommendations appreciated   as per GI  Patient without any miguelangel/overt GI bleeding and CTA negative.   Stable h/h after transfusion and no overt bleeding here.   Very chroniclly ill with multiple medical comorbidities including ILD. At this point would not pursue diagnostic procedures without evidence of new overt/active Gi bleeding.   Continue all meds and give BID PPI for at least 8 weeks then once daily for life.   Best if patient goes to [facility] facilities where he had his surgery/EGD's from now on despite [facility]'s proximity to our hospital.  Continue regular diet    Permanent Afib  EP consulted recommendations appreciated   Candidate for left atrial appendage occlusion device implant as stated by Dr. King.  as per EP   The option of immediate transition to dual anti-platelet(clopidogrel & aspirin) from apixaban upon implant were reviewed   Dual anti-platelet therapy for 6 months post implant will be followed by aspirin 81 mg indefinitely thereafter.   Plan to follow up with Dr king - He is inquiring about possibility of inpatient transfer   - patient considering getting the watchman in house if possible.   possible transfer to [facility] if accepted by EP team at [facility],, awaiting call back     mild PRABHA  received gentle IV Fluids   now improved   Continue to monitor  appears euvolemic  holding home lasix due to prabha, can restart as indicated       HTN/HLD  Continue home metoprolol   Continue home statin   lasix held 5/23 due to prabha     Known L5 Compression fracture  denies bowel bladder incontinence  Continue multimodal pain control   IR consulted - unable to do kyphoplasty here  Continue to work with PT  Will consult Neurosurgery, recommendations appreciated  Will need watch prior to kyphoplasty   Home ortho spine Dr. li     hx left eye irritation from abx   Continue home jasmin and loteprednol   patient to bring in loteprednol     Code Status Full Code   Dvt ppx - eliquis     Dispo  Transfer to [facility] vs [facility] and outpatient follow up         55 minutes spent on total encounter. The necessity of the time spent during the encounter on this date of service was due to:     Time spent coordinating the patient's care. This includes reviewing documentation pertinent to this admission, results and imaging. Further tests, medications, and procedures have been ordered as indicated. Laboratory results and the plan of care were communicated to the patient and family.  Discussed care plan with nursing and will discuss plan and care with appropriate consultant(s).

## 2025-05-25 LAB
ANION GAP SERPL CALC-SCNC: 5 MMOL/L — SIGNIFICANT CHANGE UP (ref 5–17)
BUN SERPL-MCNC: 18 MG/DL — SIGNIFICANT CHANGE UP (ref 7–23)
CALCIUM SERPL-MCNC: 8.7 MG/DL — SIGNIFICANT CHANGE UP (ref 8.5–10.1)
CHLORIDE SERPL-SCNC: 109 MMOL/L — HIGH (ref 96–108)
CO2 SERPL-SCNC: 27 MMOL/L — SIGNIFICANT CHANGE UP (ref 22–31)
CREAT SERPL-MCNC: 1.15 MG/DL — SIGNIFICANT CHANGE UP (ref 0.5–1.3)
EGFR: 64 ML/MIN/1.73M2 — SIGNIFICANT CHANGE UP
EGFR: 64 ML/MIN/1.73M2 — SIGNIFICANT CHANGE UP
GLUCOSE SERPL-MCNC: 84 MG/DL — SIGNIFICANT CHANGE UP (ref 70–99)
HCT VFR BLD CALC: 31.3 % — LOW (ref 39–50)
HGB BLD-MCNC: 9.1 G/DL — LOW (ref 13–17)
MCHC RBC-ENTMCNC: 29.1 G/DL — LOW (ref 32–36)
MCHC RBC-ENTMCNC: 29.6 PG — SIGNIFICANT CHANGE UP (ref 27–34)
MCV RBC AUTO: 102 FL — HIGH (ref 80–100)
NRBC # BLD AUTO: 0 K/UL — SIGNIFICANT CHANGE UP (ref 0–0)
NRBC # FLD: 0 K/UL — SIGNIFICANT CHANGE UP (ref 0–0)
NRBC BLD AUTO-RTO: 0 /100 WBCS — SIGNIFICANT CHANGE UP (ref 0–0)
PLATELET # BLD AUTO: 152 K/UL — SIGNIFICANT CHANGE UP (ref 150–400)
PMV BLD: 9.7 FL — SIGNIFICANT CHANGE UP (ref 7–13)
POTASSIUM SERPL-MCNC: 4.1 MMOL/L — SIGNIFICANT CHANGE UP (ref 3.5–5.3)
POTASSIUM SERPL-SCNC: 4.1 MMOL/L — SIGNIFICANT CHANGE UP (ref 3.5–5.3)
RBC # BLD: 3.07 M/UL — LOW (ref 4.2–5.8)
RBC # FLD: 19.8 % — HIGH (ref 10.3–14.5)
SODIUM SERPL-SCNC: 141 MMOL/L — SIGNIFICANT CHANGE UP (ref 135–145)
WBC # BLD: 3.84 K/UL — SIGNIFICANT CHANGE UP (ref 3.8–10.5)
WBC # FLD AUTO: 3.84 K/UL — SIGNIFICANT CHANGE UP (ref 3.8–10.5)

## 2025-05-25 PROCEDURE — 99232 SBSQ HOSP IP/OBS MODERATE 35: CPT

## 2025-05-25 RX ADMIN — SODIUM CHLORIDE 1 DROP(S): 50 OINTMENT OPHTHALMIC at 09:02

## 2025-05-25 RX ADMIN — LIDOCAINE HYDROCHLORIDE 1 PATCH: 20 JELLY TOPICAL at 21:02

## 2025-05-25 RX ADMIN — LIDOCAINE HYDROCHLORIDE 1 PATCH: 20 JELLY TOPICAL at 19:29

## 2025-05-25 RX ADMIN — Medication 20 MILLIEQUIVALENT(S): at 08:59

## 2025-05-25 RX ADMIN — SODIUM CHLORIDE 1 DROP(S): 50 OINTMENT OPHTHALMIC at 23:18

## 2025-05-25 RX ADMIN — Medication 40 MILLIGRAM(S): at 21:01

## 2025-05-25 RX ADMIN — FLUTICASONE PROPIONATE 1 SPRAY(S): 50 SPRAY, METERED NASAL at 08:58

## 2025-05-25 RX ADMIN — FLUTICASONE PROPIONATE 1 SPRAY(S): 50 SPRAY, METERED NASAL at 21:01

## 2025-05-25 RX ADMIN — ROSUVASTATIN CALCIUM 20 MILLIGRAM(S): 20 TABLET, FILM COATED ORAL at 21:01

## 2025-05-25 RX ADMIN — SODIUM CHLORIDE 1 DROP(S): 50 OINTMENT OPHTHALMIC at 17:11

## 2025-05-25 RX ADMIN — Medication 1 GRAM(S): at 05:02

## 2025-05-25 RX ADMIN — METOPROLOL SUCCINATE 50 MILLIGRAM(S): 50 TABLET, EXTENDED RELEASE ORAL at 08:58

## 2025-05-25 RX ADMIN — Medication 40 MILLIGRAM(S): at 09:01

## 2025-05-25 RX ADMIN — Medication 1 GRAM(S): at 09:02

## 2025-05-25 RX ADMIN — APIXABAN 2.5 MILLIGRAM(S): 2.5 TABLET, FILM COATED ORAL at 08:58

## 2025-05-25 RX ADMIN — LIDOCAINE HYDROCHLORIDE 1 PATCH: 20 JELLY TOPICAL at 09:02

## 2025-05-25 RX ADMIN — Medication 1 GRAM(S): at 17:10

## 2025-05-25 RX ADMIN — APIXABAN 2.5 MILLIGRAM(S): 2.5 TABLET, FILM COATED ORAL at 21:01

## 2025-05-25 RX ADMIN — Medication 1 GRAM(S): at 23:18

## 2025-05-25 RX ADMIN — Medication 0.2 MILLIGRAM(S): at 18:49

## 2025-05-25 NOTE — PROGRESS NOTE ADULT - SUBJECTIVE AND OBJECTIVE BOX
79 y/o M with PMHx of heart murmur, polycythemia, interstitial lung disease, HLD, HTN, aortic aneurysm, RA, L5 fracture, chronic afib on Eliquis, appendectomy, recent history of perforated peptic ulcer admitted at [facility], required multiple blood transfusions, was also found to have duodenal bleeding which was clipped, post op infection requiring IV abx and drains, discharged to [facility] for rehab, presented to the ED after getting routine blood work that showed a hemoglobin of 6.6. Patient was sent to the ED from facility. patient denies any signs of active bleeding. denies bright red blood or black stools. Denies chest pain shortness of breath fevers chills nausea vomiting diarrhea, in the ed patient with /66, T 97.8 HR 68 RR 18 SPO2 96% on Room Air. Patient reports chronic back pain from know L5 fracture. Was scheduled for kyphoplasty prior to recent hospitalization for GI bleed.  Patient found to have hgb 7.1 in the ED and received 1unit of pRBC. Patient to be admitted to the hospitalist service for GI bleed.     Subjective: Patient seen and examined. Resting comfortably. Pain controlled. Reports continued fatigue, moreso than yesterday. Pain controlled at this time at rest.  Cr improved. will monitor off IV Fluids.  Yesterday Discussed with Dr King (cardio)  and Dr li (ortho spine). Patient will need Watchman prior to any procedure. Dr king will look into possible transfer to [facility]. Patient ambulating with PT with prior IV tylenol use. Will need outpatient pain control prior to discharge. Additional results/Imaging, I have personally reviewed:All other systems reviewed and found to be negative with the exception of what has been described above.      PHYSICAL EXAM:    Daily     Daily     ICU Vital Signs Last 24 Hrs  T(C): 36.2 (25 May 2025 08:58), Max: 36.5 (25 May 2025 05:13)  T(F): 97.2 (25 May 2025 08:58), Max: 97.7 (25 May 2025 05:13)  HR: 75 (25 May 2025 12:40) (50 - 82)  BP: 104/80 (25 May 2025 12:40) (104/80 - 127/71)  BP(mean): --  ABP: --  ABP(mean): --  RR: 18 (25 May 2025 12:40) (18 - 18)  SpO2: 94% (25 May 2025 12:40) (94% - 98%)    O2 Parameters below as of 25 May 2025 12:40  Patient On (Oxygen Delivery Method): nasal cannula  O2 Flow (L/min): 2  PHYSICAL EXAM:  General:     drowsy, but arousable   Skin: no rash or prominent lesions  Head: normocephalic, atraumatic   left eye irritation,   Nose: no external lesions, mucosa non-inflamed, septum and turbinates normal  Neck: Supple Thyroid no thyromegaly,  Heart: RRR, no murmur or gallop.  Normal S1, S2.  No S3, S4.   Lungs: CTA bilaterally, no wheezes, rhonchi, rales.  Breathing unlabored.   Chest wall: Normal insp   Abdomen:  Soft, Non distended nontender positive bowel sounds No peritoneal signs.   Back: spine normal without deformity or tenderness.  Normal ROM   Extremities: No deformities, clubbing, cyanosis, or edema.  Musculoskeletal: Normal gait. No decreased range of motion, instability, atrophy or abnormal strength or tone in the head, neck, spine, ribs, pelvis or extremities.   Neurologic: CN 2-12 normal. Sensation to pain, touch and proprioception normal. DTRs normal in upper and lower extremities. No pathologic reflexes.  Motor normal.  Psychiatric: Oriented X3, intact judgement and insight, normal mood and affect.    labs reviewed                                    9.1    3.84  )-----------( 152      ( 25 May 2025 06:31 )             31.3       CBC Full  -  ( 25 May 2025 06:31 )  WBC Count : 3.84 K/uL  RBC Count : 3.07 M/uL  Hemoglobin : 9.1 g/dL  Hematocrit : 31.3 %  Platelet Count - Automated : 152 K/uL  Mean Cell Volume : 102.0 fl  Mean Cell Hemoglobin : 29.6 pg  Mean Cell Hemoglobin Concentration : 29.1 g/dL  Auto Neutrophil # : x  Auto Lymphocyte # : x  Auto Monocyte # : x  Auto Eosinophil # : x  Auto Basophil # : x  Auto Neutrophil % : x  Auto Lymphocyte % : x  Auto Monocyte % : x  Auto Eosinophil % : x  Auto Basophil % : x      05-25    141  |  109[H]  |  18  ----------------------------<  84  4.1   |  27  |  1.15    Ca    8.7      25 May 2025 06:31                      Urinalysis Basic - ( 25 May 2025 06:31 )    Color: x / Appearance: x / SG: x / pH: x  Gluc: 84 mg/dL / Ketone: x  / Bili: x / Urobili: x   Blood: x / Protein: x / Nitrite: x   Leuk Esterase: x / RBC: x / WBC x   Sq Epi: x / Non Sq Epi: x / Bacteria: x            MEDICATIONS  (STANDING):  acetaminophen     Tablet .. 650 milliGRAM(s) Oral once  apixaban 2.5 milliGRAM(s) Oral two times a day  fluticasone propionate 50 MICROgram(s)/spray Nasal Spray 1 Spray(s) Both Nostrils two times a day  lidocaine   4% Patch 1 Patch Transdermal daily  metoprolol succinate ER 50 milliGRAM(s) Oral daily  pantoprazole  Injectable 40 milliGRAM(s) IV Push every 12 hours  potassium chloride    Tablet ER 20 milliEquivalent(s) Oral daily  rosuvastatin 20 milliGRAM(s) Oral at bedtime  sodium chloride 5% Solution 1 Drop(s) Both EYES four times a day  sucralfate 1 Gram(s) Oral every 6 hours          CULTURES:      Telemetry, personally reviewed

## 2025-05-25 NOTE — PROGRESS NOTE ADULT - ASSESSMENT
81 y/o M with PMHx of heart murmur, polycythemia, interstitial lung disease, HLD, HTN, aortic aneurysm, RA, L5 fracture, chronic afib on Eliquis, appendectomy, recent history of perforated peptic ulcer  Patient to be admitted to the hospitalist service for GI bleed.       Anemia, GI bleed  Significant bleeding risk   S/p 1 unit of pRBC on admission -  Held AC and aspirin 5/20  -low dose eliquis resumed 5/23 - h/h stable   Continue to monitor h/h closely - stable  transfuse hgb <7   Protonix BID   Sucralfate q6  GI consulted , recommendations appreciated   as per GI  Patient without any miguelangel/overt GI bleeding and CTA negative.   Stable h/h after transfusion and no overt bleeding here.   Very chroniclly ill with multiple medical comorbidities including ILD. At this point would not pursue diagnostic procedures without evidence of new overt/active Gi bleeding.   Continue all meds and give BID PPI for at least 8 weeks then once daily for life.   Best if patient goes to [facility] facilities where he had his surgery/EGD's from now on despite [facility]'s proximity to our hospital.  Continue regular diet    Permanent Afib  EP consulted recommendations appreciated   Candidate for left atrial appendage occlusion device implant as stated by Dr. King.  as per EP   The option of immediate transition to dual anti-platelet(clopidogrel & aspirin) from apixaban upon implant were reviewed   Dual anti-platelet therapy for 6 months post implant will be followed by aspirin 81 mg indefinitely thereafter.   Plan to follow up with Dr king - He is inquiring about possibility of inpatient transfer   -[facility] EP team 5/25 no plan for inpatient transfer for watchmann procedure     mild PRABHA  received gentle IV Fluids   now improved   Continue to monitor  appears euvolemic  holding home lasix due to prabha, can restart as indicated       HTN/HLD  Continue home metoprolol   Continue home statin   lasix held 5/23 due to prabha     Known L5 Compression fracture  denies bowel bladder incontinence  Continue multimodal pain control   IR consulted - unable to do kyphoplasty here  Continue to work with PT  Will consult Neurosurgery, recommendations appreciated  Will need watch prior to kyphoplasty   Home ortho spine Dr. li     hx left eye irritation from abx   Continue home jasmin and loteprednol   patient to bring in loteprednol     Code Status Full Code   Dvt ppx - eliquis     Dispo  I dw EP no indication for transfer to [facility], plan for dc [facility] and outpatient f/u for watchman        55 minutes spent on total encounter. The necessity of the time spent during the encounter on this date of service was due to:     Time spent coordinating the patient's care. This includes reviewing documentation pertinent to this admission, results and imaging. Further tests, medications, and procedures have been ordered as indicated. Laboratory results and the plan of care were communicated to the patient and family.  Discussed care plan with nursing and will discuss plan and care with appropriate consultant(s).

## 2025-05-26 ENCOUNTER — TRANSCRIPTION ENCOUNTER (OUTPATIENT)
Age: 80
End: 2025-05-26

## 2025-05-26 VITALS
TEMPERATURE: 98 F | SYSTOLIC BLOOD PRESSURE: 120 MMHG | DIASTOLIC BLOOD PRESSURE: 67 MMHG | HEART RATE: 53 BPM | RESPIRATION RATE: 18 BRPM | OXYGEN SATURATION: 92 %

## 2025-05-26 LAB
ANION GAP SERPL CALC-SCNC: 5 MMOL/L — SIGNIFICANT CHANGE UP (ref 5–17)
BASOPHILS # BLD AUTO: 0.03 K/UL — SIGNIFICANT CHANGE UP (ref 0–0.2)
BASOPHILS NFR BLD AUTO: 0.7 % — SIGNIFICANT CHANGE UP (ref 0–2)
BUN SERPL-MCNC: 19 MG/DL — SIGNIFICANT CHANGE UP (ref 7–23)
CALCIUM SERPL-MCNC: 8.9 MG/DL — SIGNIFICANT CHANGE UP (ref 8.5–10.1)
CHLORIDE SERPL-SCNC: 111 MMOL/L — HIGH (ref 96–108)
CO2 SERPL-SCNC: 28 MMOL/L — SIGNIFICANT CHANGE UP (ref 22–31)
CREAT SERPL-MCNC: 1.23 MG/DL — SIGNIFICANT CHANGE UP (ref 0.5–1.3)
EGFR: 59 ML/MIN/1.73M2 — LOW
EGFR: 59 ML/MIN/1.73M2 — LOW
EOSINOPHIL # BLD AUTO: 0.14 K/UL — SIGNIFICANT CHANGE UP (ref 0–0.5)
EOSINOPHIL NFR BLD AUTO: 3.4 % — SIGNIFICANT CHANGE UP (ref 0–6)
GLUCOSE SERPL-MCNC: 89 MG/DL — SIGNIFICANT CHANGE UP (ref 70–99)
HCT VFR BLD CALC: 31.8 % — LOW (ref 39–50)
HGB BLD-MCNC: 9.5 G/DL — LOW (ref 13–17)
IMM GRANULOCYTES # BLD AUTO: 0.02 K/UL — SIGNIFICANT CHANGE UP (ref 0–0.07)
IMM GRANULOCYTES NFR BLD AUTO: 0.5 % — SIGNIFICANT CHANGE UP (ref 0–0.9)
LYMPHOCYTES # BLD AUTO: 1.35 K/UL — SIGNIFICANT CHANGE UP (ref 1–3.3)
LYMPHOCYTES NFR BLD AUTO: 33.1 % — SIGNIFICANT CHANGE UP (ref 13–44)
MCHC RBC-ENTMCNC: 29.9 G/DL — LOW (ref 32–36)
MCHC RBC-ENTMCNC: 29.9 PG — SIGNIFICANT CHANGE UP (ref 27–34)
MCV RBC AUTO: 100 FL — SIGNIFICANT CHANGE UP (ref 80–100)
MONOCYTES # BLD AUTO: 0.66 K/UL — SIGNIFICANT CHANGE UP (ref 0–0.9)
MONOCYTES NFR BLD AUTO: 16.2 % — HIGH (ref 2–14)
NEUTROPHILS # BLD AUTO: 1.88 K/UL — SIGNIFICANT CHANGE UP (ref 1.8–7.4)
NEUTROPHILS NFR BLD AUTO: 46.1 % — SIGNIFICANT CHANGE UP (ref 43–77)
NRBC # BLD AUTO: 0 K/UL — SIGNIFICANT CHANGE UP (ref 0–0)
NRBC # FLD: 0 K/UL — SIGNIFICANT CHANGE UP (ref 0–0)
NRBC BLD AUTO-RTO: 0 /100 WBCS — SIGNIFICANT CHANGE UP (ref 0–0)
PLATELET # BLD AUTO: 158 K/UL — SIGNIFICANT CHANGE UP (ref 150–400)
PMV BLD: 9.5 FL — SIGNIFICANT CHANGE UP (ref 7–13)
POTASSIUM SERPL-MCNC: 4.5 MMOL/L — SIGNIFICANT CHANGE UP (ref 3.5–5.3)
POTASSIUM SERPL-SCNC: 4.5 MMOL/L — SIGNIFICANT CHANGE UP (ref 3.5–5.3)
RBC # BLD: 3.18 M/UL — LOW (ref 4.2–5.8)
RBC # FLD: 19.3 % — HIGH (ref 10.3–14.5)
SODIUM SERPL-SCNC: 144 MMOL/L — SIGNIFICANT CHANGE UP (ref 135–145)
WBC # BLD: 4.08 K/UL — SIGNIFICANT CHANGE UP (ref 3.8–10.5)
WBC # FLD AUTO: 4.08 K/UL — SIGNIFICANT CHANGE UP (ref 3.8–10.5)

## 2025-05-26 PROCEDURE — 99239 HOSP IP/OBS DSCHRG MGMT >30: CPT

## 2025-05-26 RX ORDER — LIDOCAINE HYDROCHLORIDE 20 MG/ML
1 JELLY TOPICAL
Qty: 0 | Refills: 0 | DISCHARGE
Start: 2025-05-26

## 2025-05-26 RX ORDER — ASPIRIN 325 MG
1 TABLET ORAL
Refills: 0 | DISCHARGE

## 2025-05-26 RX ORDER — OXYCODONE HYDROCHLORIDE AND ACETAMINOPHEN 10; 325 MG/1; MG/1
1 TABLET ORAL
Qty: 0 | Refills: 0 | DISCHARGE
Start: 2025-05-26

## 2025-05-26 RX ORDER — CYCLOBENZAPRINE HYDROCHLORIDE 15 MG/1
1 CAPSULE, EXTENDED RELEASE ORAL
Qty: 0 | Refills: 0 | DISCHARGE
Start: 2025-05-26

## 2025-05-26 RX ADMIN — SODIUM CHLORIDE 1 DROP(S): 50 OINTMENT OPHTHALMIC at 05:11

## 2025-05-26 RX ADMIN — Medication 1 GRAM(S): at 12:09

## 2025-05-26 RX ADMIN — Medication 20 MILLIEQUIVALENT(S): at 09:17

## 2025-05-26 RX ADMIN — LIDOCAINE HYDROCHLORIDE 1 PATCH: 20 JELLY TOPICAL at 09:10

## 2025-05-26 RX ADMIN — Medication 1 GRAM(S): at 05:10

## 2025-05-26 RX ADMIN — METOPROLOL SUCCINATE 50 MILLIGRAM(S): 50 TABLET, EXTENDED RELEASE ORAL at 09:18

## 2025-05-26 RX ADMIN — SODIUM CHLORIDE 1 DROP(S): 50 OINTMENT OPHTHALMIC at 12:09

## 2025-05-26 RX ADMIN — Medication 40 MILLIGRAM(S): at 09:09

## 2025-05-26 RX ADMIN — FLUTICASONE PROPIONATE 1 SPRAY(S): 50 SPRAY, METERED NASAL at 09:19

## 2025-05-26 RX ADMIN — APIXABAN 2.5 MILLIGRAM(S): 2.5 TABLET, FILM COATED ORAL at 09:17

## 2025-05-26 NOTE — DISCHARGE NOTE PROVIDER - NSDCFUSCHEDAPPT_GEN_ALL_CORE_FT
[facility]  CARDIOLOGY [address]  Scheduled Appointment: 06/18/2025

## 2025-05-26 NOTE — DISCHARGE NOTE PROVIDER - DISCHARGE DATE
Group Topic: BH ARACELIS Process Group    Date: 3/3/2022  Start Time:  1:45 PM  End Time:  2:30 PM  Facilitators: Roseline Quintana LPC    Focus: Check-Out  Number in attendance: 7    Process/Check-Out group is a safe and confidential therapeutic environment where pt.’s can share their concerns and struggles, as well as, receive assistance on processing and reflecting on themself, their thoughts, their behaviors, and their actions that have occurred during the day while receiving feedback, multiple perspectives, support and encouragement from other individuals.       Goals: Processing the day in tx, plans for tonight, and other related components of life and how it affects our recovery.     Handouts: Recovery Preparedness Plan   Method: Group    Attendance: Present   Mood/Affect: Appropriate    Behavior/Socialization: Appropriate to group and Provided feedback to peer    Participation: Active    Overall Patient Response to Group: Appropriate to topic, Asked questions and Demonstrated insight    Individual Response to Group: \"How detrimental emotional abuse is and can be.\"   Ability to Apply Content to Group: 5            26-May-2025

## 2025-05-26 NOTE — DISCHARGE NOTE PROVIDER - NSDCMRMEDTOKEN_GEN_ALL_CORE_FT
cyclobenzaprine 5 mg oral tablet: 1 tab(s) orally 3 times a day as needed for Muscle Spasm  Eliquis 2.5 mg oral tablet: 1 tab(s) orally 2 times a day  ferrous sulfate 325 mg (65 mg elemental iron) oral tablet: 1 tab(s) orally once a day  Flonase 50 mcg/inh nasal spray: 1 spray(s) in each nostril 2 times a day  furosemide 40 mg oral tablet: 1 tab(s) orally once a day  lidocaine 4% topical film: Apply topically to affected area once a day as needed for back pain  loteprednol 0.5% ophthalmic suspension: 2 drop(s) in each eye 4 times a day for 14 days  melatonin 3 mg oral tablet: 2 tab(s) orally once a day (at bedtime)  metoprolol succinate 50 mg oral tablet, extended release: 1 tab(s) orally once a day  Bouchra 128 5% ophthalmic solution: 1 drop(s) in each eye 4 times a day for 14 days  Narcan 4 mg/0.1 mL nasal spray: 1 spray(s) intranasally once a day as needed for  opioid overdose  oxycodone-acetaminophen 5 mg-325 mg oral tablet: 1 tab(s) orally every 6 hours as needed for Severe Pain (7 - 10)  pantoprazole 40 mg oral delayed release tablet: 1 tab(s) orally 2 times a day then from  6/4/25  switch to 1 tab orally daily  potassium chloride 20 mEq/15 mL oral liquid: 15 milliliter(s) orally once a day  rosuvastatin 20 mg oral tablet: 1 tab(s) orally once a day (at bedtime)  sucralfate 1 g oral tablet: 1 tab(s) orally every 6 hours  Tylenol 325 mg oral tablet: 2 tab(s) orally every 6 hours as needed for  mild pain

## 2025-05-26 NOTE — DISCHARGE NOTE PROVIDER - NSDCCPCAREPLAN_GEN_ALL_CORE_FT
PRINCIPAL DISCHARGE DIAGNOSIS  Diagnosis: Anemia  Assessment and Plan of Treatment: Please follow up with Dr Marx for outpatient watchmann procedurev  follow up with your orthopedic spine surgeon for kyphoplasty after watchmann procedure   please follow up with your gastroenterologist as outpatient      SECONDARY DISCHARGE DIAGNOSES  Diagnosis: GI bleed  Assessment and Plan of Treatment:

## 2025-05-26 NOTE — DISCHARGE NOTE NURSING/CASE MANAGEMENT/SOCIAL WORK - NSDCPEELIQUIS_GEN_ALL_CORE
Apixaban/Eliquis - Compliance/Apixaban/Eliquis - Dietary Advice/Apixaban/Eliquis - Follow up monitoring/Apixaban/Eliquis - Potential for adverse drug reactions and interactions [id number] Comprehensive

## 2025-05-26 NOTE — DISCHARGE NOTE PROVIDER - NSDCDCMDCOMP_GEN_ALL_CORE
EXAM:  CHEST, TWO VIEWS



HISTORY:  CHEST PAIN AND TIGHTNESS FOR A MONTH, SYMPTOMS GETTING WORSE TODAY.  



TECHNIQUE:  PA and lateral views of the chest were obtained.  



Comparison:  Two view chest 11/07/17.  



FINDINGS:  The heart size is normal.  Postop valve repair as before.  The lungs 
appear somewhat hyperinflated particularly on the view as before, likely 
representing underlying COPD.  No definite acute infiltrate is seen today.  No 
pleural effusion or pneumothorax evident.  Spurring at the acromioclavicular 
joints as well as in the thoracic spine.  



IMPRESSION:  

1.  HYPERINFLATION CONSISTENT WITH COPD AS BEFORE.  



2.  POSTOP VALVE PROSTHESIS AS BEFORE.  



3.  DEGENERATIVE CHANGE IN THE SPINE AND SHOULDERS.  



JOB NUMBER:  [id number]
MTDD
This document is complete and the patient is ready for discharge.

## 2025-05-26 NOTE — DISCHARGE NOTE NURSING/CASE MANAGEMENT/SOCIAL WORK - PATIENT PORTAL LINK FT
You can access the FollowMyHealth Patient Portal offered by [facility] by registering at the following website: [email or web address]. By joining [employer] [facility]’s FollowMyHealth portal, you will also be able to view your health information using other applications (apps) compatible with our system.

## 2025-05-26 NOTE — DISCHARGE NOTE PROVIDER - CARE PROVIDER_API CALL
Dr Carter - your  orthopedic surgeon,   Phone: (   )    -  Fax: (   )    -  Follow Up Time:     Dr Judd,   Phone: (   )    -  Fax: (   )    -  Follow Up Time:     your gastroenterologist,   Phone: (   )    -  Fax: (   )    -  Follow Up Time:

## 2025-05-26 NOTE — DISCHARGE NOTE PROVIDER - HOSPITAL COURSE
81 y/o M with PMHx of heart murmur, polycythemia, interstitial lung disease, HLD, HTN, aortic aneurysm, RA, L5 fracture, chronic afib on Eliquis, appendectomy, recent history of perforated peptic ulcer admitted at [facility], required multiple blood transfusions, was also found to have duodenal bleeding which was clipped, post op infection requiring IV abx and drains, discharged to [facility] for rehab, presented to the ED after getting routine blood work that showed a hemoglobin of 6.6.  Anemia, GI bleed  Significant bleeding risk   S/p 1 unit of pRBC on admission -  Held AC and aspirin 5/20  -low dose eliquis resumed 5/23 - h/h stable   ::PPI BID x 8 weeks post procedure (last 6/4)  then daily for life   per GI no  endoscopic evaluation- as likely old ooze with enhancement from DOAC, catching up,  and no overt signs of GI bleed and hb now stable  Protonix BID   Sucralfate q6  GI consulted , recommendations appreciated   Patient without any imguelangel/overt GI bleeding and CTA negative.   Stable h/h after transfusion and no overt bleeding here.   Very chroniclly ill with multiple medical comorbidities including ILD. At this point would not pursue diagnostic procedures without evidence of new overt/active Gi bleeding.   Continue all meds and give BID PPI for at least 8 weeks then once daily for life.   Best if patient goes to [facility] facilities where he had his surgery/EGD's from now on despite [facility]'s proximity to our hospital.  Continue regular diet    Permanent Afib  EP consulted recommendations appreciated   Candidate for left atrial appendage occlusion device implant as stated by Dr. King.  as per EP   The option of immediate transition to dual anti-platelet(clopidogrel & aspirin) from apixaban upon implant were reviewed   Dual anti-platelet therapy for 6 months post implant will be followed by aspirin 81 mg indefinitely thereafter.   Plan to follow up with Dr king - He is inquiring about possibility of inpatient transfer   -I dw EP team 5/25 no plan for inpatient transfer for watchmann procedure     mild PRABHA resolved   received gentle IV Fluids   now improved   Continue to monitor  appears euvolemic  now resume home dose lasix        HTN/HLD  Continue home metoprolol   Continue home statin   lasix held 5/23 due to prabha     Known L5 Compression fracture  denies bowel bladder incontinence  Continue multimodal pain control   IR consulted - unable to do kyphoplasty hereNo acute neurosurgical interventions indicated at this time  LSO brace when upright and OOB   Continue to work with PT  Will consult Neurosurgery, recommendations appreciated  Will need watch prior to kyphoplasty   Home ortho spine Dr. li   would need watchmann procedure prior to neurosurgical intervention for kyphoplasty     hx left eye irritation from abx   Continue home jasmin and loteprednol   patient to bring in loteprednol     Code Status Full Code   Dvt ppx - eliquis     Dispo  I dw EP no indication for transfer to [facility], plan for dc CINDY and outpatient f/u for watchman and outpatent f/u with orthospine         55 minutes spent on total encounter. The necessity of the time spent during the encounter on this date of service was due to:     Time spent coordinating the patient's care. This includes reviewing documentation pertinent to this admission, results and imaging. Further tests, medications, and procedures have been ordered as indicated. Laboratory results and the plan of care were communicated to the patient and family.  Discussed care plan with nursing and will discuss plan and care with appropriate consultant(s).        Subjective: Patient seen and examined. Resting comfortably. Pain controlled.   Cr improved.  monitor off IV Fluids.  I discussed with [facility] EP team of Dr Gonzalez - who do not recommend [facility] transfer for PPM - I explained this to patient and his daughter at bedside, patient is very angry and upset and wants to speak with EP about this directly - I informed  EP NP about patient's request to speak with EP directly today . . Patient ambulating with PT with prior IV tylenol use.- patient now has agreed to go back to [facility]        Additional results/Imaging, I have personally reviewed:All other systems reviewed and found to be negative with the exception of what has been described above.  PHYSICAL EXAM:  General:     drowsy, but arousable   Skin: no rash or prominent lesions  Head: chronic left facial asymmetry   Heart:irregularly irregular  Lungs: CTA bilaterally, no wheezes, rhonchi, rales.  Breathing unlabored.   Chest wall: Normal insp   Abdomen:  Soft, Non distended nontender positive bowel sounds No peritoneal signs.   back - chronic lower back tenderness  Neurologic: awake, alert , follows commands, Ox3 , moves all, chronic left facial asymmetry  Psychiatric: Oriented X3, intact judgement and insight, normal mood and affect.    discharge time 37mins

## 2025-05-26 NOTE — DISCHARGE NOTE NURSING/CASE MANAGEMENT/SOCIAL WORK - FINANCIAL ASSISTANCE
[facility] provides services at a reduced cost to those who are determined to be eligible through [facility]’s financial assistance program. Information regarding [facility]’s financial assistance program can be found by going to [email or web address] or by calling [phone].

## 2025-05-26 NOTE — DISCHARGE NOTE PROVIDER - PROVIDER TOKENS
FREE:[LAST:[Dr Carter - your  orthopedic surgeon],PHONE:[(   )    -],FAX:[(   )    -]],FREE:[LAST:[Dr Judd],PHONE:[(   )    -],FAX:[(   )    -]],FREE:[LAST:[your gastroenterologist],PHONE:[(   )    -],FAX:[(   )    -]]

## 2025-05-26 NOTE — DISCHARGE NOTE NURSING/CASE MANAGEMENT/SOCIAL WORK - NSDCVIVACCINE_GEN_ALL_CORE_FT
Tdap; 12-Jun-2024 00:08; Gabrielle Roque (COLLIN); [employer]; [id number] (Exp. Date: 30-Nov-2025); IntraMuscular; Deltoid Left.; 0.5 milliLiter(s); VIS (VIS Published: 09-May-2013, VIS Presented: 12-Jun-2024);

## 2025-05-29 ENCOUNTER — RX RENEWAL (OUTPATIENT)
Age: 80
End: 2025-05-29

## 2025-05-29 RX ORDER — FUROSEMIDE 40 MG/1
40 TABLET ORAL DAILY
Qty: 90 | Refills: 1 | Status: ACTIVE | COMMUNITY
Start: 2025-05-29 | End: 1900-01-01

## 2025-06-02 DIAGNOSIS — M81.0 AGE-RELATED OSTEOPOROSIS WITHOUT CURRENT PATHOLOGICAL FRACTURE: ICD-10-CM

## 2025-06-02 DIAGNOSIS — N17.9 ACUTE KIDNEY FAILURE, UNSPECIFIED: ICD-10-CM

## 2025-06-02 DIAGNOSIS — E78.5 HYPERLIPIDEMIA, UNSPECIFIED: ICD-10-CM

## 2025-06-02 DIAGNOSIS — Z79.82 LONG TERM (CURRENT) USE OF ASPIRIN: ICD-10-CM

## 2025-06-02 DIAGNOSIS — D75.1 SECONDARY POLYCYTHEMIA: ICD-10-CM

## 2025-06-02 DIAGNOSIS — Z85.828 PERSONAL HISTORY OF OTHER MALIGNANT NEOPLASM OF SKIN: ICD-10-CM

## 2025-06-02 DIAGNOSIS — I10 ESSENTIAL (PRIMARY) HYPERTENSION: ICD-10-CM

## 2025-06-02 DIAGNOSIS — M48.56XA COLLAPSED VERTEBRA, NOT ELSEWHERE CLASSIFIED, LUMBAR REGION, INITIAL ENCOUNTER FOR FRACTURE: ICD-10-CM

## 2025-06-02 DIAGNOSIS — K92.2 GASTROINTESTINAL HEMORRHAGE, UNSPECIFIED: ICD-10-CM

## 2025-06-02 DIAGNOSIS — G89.29 OTHER CHRONIC PAIN: ICD-10-CM

## 2025-06-02 DIAGNOSIS — Z79.01 LONG TERM (CURRENT) USE OF ANTICOAGULANTS: ICD-10-CM

## 2025-06-02 DIAGNOSIS — D64.9 ANEMIA, UNSPECIFIED: ICD-10-CM

## 2025-06-02 DIAGNOSIS — I48.21 PERMANENT ATRIAL FIBRILLATION: ICD-10-CM

## 2025-06-18 ENCOUNTER — APPOINTMENT (OUTPATIENT)
Dept: CARDIOLOGY | Facility: CLINIC | Age: 80
End: 2025-06-18

## 2025-06-19 ENCOUNTER — OUTPATIENT (OUTPATIENT)
Dept: OUTPATIENT SERVICES | Facility: HOSPITAL | Age: 80
LOS: 1 days | End: 2025-06-19
Payer: MEDICARE

## 2025-06-19 DIAGNOSIS — I48.0 PAROXYSMAL ATRIAL FIBRILLATION: ICD-10-CM

## 2025-06-19 DIAGNOSIS — Z98.890 OTHER SPECIFIED POSTPROCEDURAL STATES: Chronic | ICD-10-CM

## 2025-06-19 DIAGNOSIS — Z01.818 ENCOUNTER FOR OTHER PREPROCEDURAL EXAMINATION: ICD-10-CM

## 2025-06-19 DIAGNOSIS — Z90.49 ACQUIRED ABSENCE OF OTHER SPECIFIED PARTS OF DIGESTIVE TRACT: Chronic | ICD-10-CM

## 2025-06-19 DIAGNOSIS — Z98.49 CATARACT EXTRACTION STATUS, UNSPECIFIED EYE: Chronic | ICD-10-CM

## 2025-06-19 LAB
ALBUMIN SERPL ELPH-MCNC: 3.8 G/DL — SIGNIFICANT CHANGE UP (ref 3.3–5.2)
ALP SERPL-CCNC: 95 U/L — SIGNIFICANT CHANGE UP (ref 40–120)
ALT FLD-CCNC: 8 U/L — SIGNIFICANT CHANGE UP
ANION GAP SERPL CALC-SCNC: 14 MMOL/L — SIGNIFICANT CHANGE UP (ref 5–17)
AST SERPL-CCNC: 20 U/L — SIGNIFICANT CHANGE UP
BILIRUB SERPL-MCNC: 0.4 MG/DL — SIGNIFICANT CHANGE UP (ref 0.4–2)
BLD GP AB SCN SERPL QL: SIGNIFICANT CHANGE UP
BUN SERPL-MCNC: 30.5 MG/DL — HIGH (ref 8–20)
CALCIUM SERPL-MCNC: 9.3 MG/DL — SIGNIFICANT CHANGE UP (ref 8.4–10.5)
CHLORIDE SERPL-SCNC: 98 MMOL/L — SIGNIFICANT CHANGE UP (ref 96–108)
CO2 SERPL-SCNC: 24 MMOL/L — SIGNIFICANT CHANGE UP (ref 22–29)
CREAT SERPL-MCNC: 1.16 MG/DL — SIGNIFICANT CHANGE UP (ref 0.5–1.3)
EGFR: 63 ML/MIN/1.73M2 — SIGNIFICANT CHANGE UP
EGFR: 63 ML/MIN/1.73M2 — SIGNIFICANT CHANGE UP
GLUCOSE SERPL-MCNC: 93 MG/DL — SIGNIFICANT CHANGE UP (ref 70–99)
HCT VFR BLD CALC: 34.5 % — LOW (ref 39–50)
HGB BLD-MCNC: 10.5 G/DL — LOW (ref 13–17)
INR BLD: 1.34 RATIO — HIGH (ref 0.85–1.16)
MAGNESIUM SERPL-MCNC: 1.8 MG/DL — SIGNIFICANT CHANGE UP (ref 1.6–2.6)
MCHC RBC-ENTMCNC: 28.1 PG — SIGNIFICANT CHANGE UP (ref 27–34)
MCHC RBC-ENTMCNC: 30.4 G/DL — LOW (ref 32–36)
MCV RBC AUTO: 92.2 FL — SIGNIFICANT CHANGE UP (ref 80–100)
MRSA PCR RESULT.: SIGNIFICANT CHANGE UP
NRBC # BLD AUTO: 0 K/UL — SIGNIFICANT CHANGE UP (ref 0–0)
NRBC # FLD: 0 K/UL — SIGNIFICANT CHANGE UP (ref 0–0)
NRBC BLD AUTO-RTO: 0 /100 WBCS — SIGNIFICANT CHANGE UP (ref 0–0)
PLATELET # BLD AUTO: 157 K/UL — SIGNIFICANT CHANGE UP (ref 150–400)
PMV BLD: 10.2 FL — SIGNIFICANT CHANGE UP (ref 7–13)
POTASSIUM SERPL-MCNC: 4.8 MMOL/L — SIGNIFICANT CHANGE UP (ref 3.5–5.3)
POTASSIUM SERPL-SCNC: 4.8 MMOL/L — SIGNIFICANT CHANGE UP (ref 3.5–5.3)
PROT SERPL-MCNC: 7.4 G/DL — SIGNIFICANT CHANGE UP (ref 6.6–8.7)
PROTHROM AB SERPL-ACNC: 15.5 SEC — HIGH (ref 9.9–13.4)
RBC # BLD: 3.74 M/UL — LOW (ref 4.2–5.8)
RBC # FLD: 15.4 % — HIGH (ref 10.3–14.5)
S AUREUS DNA NOSE QL NAA+PROBE: SIGNIFICANT CHANGE UP
SODIUM SERPL-SCNC: 136 MMOL/L — SIGNIFICANT CHANGE UP (ref 135–145)
WBC # BLD: 4.56 K/UL — SIGNIFICANT CHANGE UP (ref 3.8–10.5)
WBC # FLD AUTO: 4.56 K/UL — SIGNIFICANT CHANGE UP (ref 3.8–10.5)

## 2025-06-19 PROCEDURE — 87641 MR-STAPH DNA AMP PROBE: CPT

## 2025-06-19 PROCEDURE — 36415 COLL VENOUS BLD VENIPUNCTURE: CPT

## 2025-06-19 PROCEDURE — 86900 BLOOD TYPING SEROLOGIC ABO: CPT

## 2025-06-19 PROCEDURE — 85610 PROTHROMBIN TIME: CPT

## 2025-06-19 PROCEDURE — 93005 ELECTROCARDIOGRAM TRACING: CPT

## 2025-06-19 PROCEDURE — 86850 RBC ANTIBODY SCREEN: CPT

## 2025-06-19 PROCEDURE — 83735 ASSAY OF MAGNESIUM: CPT

## 2025-06-19 PROCEDURE — 93010 ELECTROCARDIOGRAM REPORT: CPT

## 2025-06-19 PROCEDURE — G0463: CPT

## 2025-06-19 PROCEDURE — 87640 STAPH A DNA AMP PROBE: CPT

## 2025-06-19 PROCEDURE — 85027 COMPLETE CBC AUTOMATED: CPT

## 2025-06-19 PROCEDURE — 80053 COMPREHEN METABOLIC PANEL: CPT

## 2025-06-19 PROCEDURE — 86901 BLOOD TYPING SEROLOGIC RH(D): CPT

## 2025-06-19 RX ORDER — FUROSEMIDE 10 MG/ML
1 INJECTION INTRAMUSCULAR; INTRAVENOUS
Refills: 0 | DISCHARGE

## 2025-06-19 RX ORDER — ACETAMINOPHEN 500 MG/5ML
2 LIQUID (ML) ORAL
Refills: 0 | DISCHARGE

## 2025-06-19 RX ORDER — APIXABAN 2.5 MG/1
1 TABLET, FILM COATED ORAL
Refills: 0 | DISCHARGE

## 2025-06-19 RX ORDER — MELATONIN 5 MG
2 TABLET ORAL
Refills: 0 | DISCHARGE

## 2025-06-19 RX ORDER — FLUTICASONE PROPIONATE 50 UG/1
1 SPRAY, METERED NASAL
Refills: 0 | DISCHARGE

## 2025-06-19 RX ORDER — FERROUS SULFATE 137(45) MG
1 TABLET, EXTENDED RELEASE ORAL
Refills: 0 | DISCHARGE

## 2025-06-19 RX ORDER — NALOXONE HYDROCHLORIDE 0.4 MG/ML
1 INJECTION, SOLUTION INTRAMUSCULAR; INTRAVENOUS; SUBCUTANEOUS
Qty: 0 | Refills: 0 | DISCHARGE

## 2025-06-19 RX ORDER — SUCRALFATE 1 G
1 TABLET ORAL
Refills: 0 | DISCHARGE

## 2025-06-19 NOTE — H&P PST ADULT - ASSESSMENT
ndication:     Risk Stratification:  ASA:  Mallampati:      Plan/Recommendations:   -plan for **** on ***  -patient seen and examined in PST on ***  -ECG and Labs reviewed  -NPO after midnight prior with exception of sip of water with morning medications  -Continue/Hold ***  -Pre-procedure instructions provided (verbal & written)   -Supplies and verbal/written Instructions for pre-surgical chlorhexadine shower provided*****  -Consent to be obtained by attending electrophysiologist on the scheduled procedure date            Ptaient now presents to [facility] PST for NICK guided LAAO procedure on with DR Gonzalez on 06/27/25     79 y/o M with PMHx of heart murmur, polycythemia, interstitial lung disease, HLD, HTN, aortic aneurysm, RA, L5 fracture, chronic afib on Eliquis,CHADS Vasc score of 4. appendectomy, recent history of perforated peptic ulcer [place] [facility], required multiple blood transfusions, was also found to have duodenal bleeding which was clipped, post op infection requiring IV abx and drains, discharged to [facility] for rehab, presented to the ED on 05/19/25  after getting routine blood work that showed a hemoglobin of 6.6.  S/p 1 unit of pRBC on admission, WEliquis was hold dure to GIB,  eliquis resumed 5/23 - h/h stable CTA negative. for active bleed, He was discharge from  on 05/26/25  per GI no  endoscopic evaluation- as likely old ooze with enhancement from DOAC, catching up,  and no overt signs of GI bleed and hb now stable  Patient is pending  L5 kyphoplasty post LAAO procedure, once he si stable to be off Eliquis,  For his . Ascending thoracic aneurysm: Patient is stable and no longer being followed by Cardiothoracic Surgery (Dr. Blount).  patient endorses no afib symptoms, other than generalized fatigue, per patient, per pt, he is feeling better post discharge from  after being in rehab.  His hB/HCT has been stable post GIB.   His echo from 03/24 revealed preserved EF, severely dilated LA, moderate AR, mild MR, TR. His ST from 2023 revealed no evidence of infarction or inducible ischemia.  He ha chronic back pain, pending kyphoplasty.   He denies HA, dizziness, SOB, CP, palpations hemoptysis, hematemesis, hematochezia melena,   Pt now presents to [facility] PST for pre procedural evaluation prior to his upcoming LAAO procedure. on 06/27/25 with DR Gonzalez      Risk Stratification:  ASA:per anesthesia  Mallampati:per anesthesia      Plan/Recommendations:   -plan for **** on ***  -patient seen and examined in PST on ***  -ECG and Labs reviewed  -NPO after midnight prior with exception of sip of water with morning medications  -Continue/Hold ***  -Pre-procedure instructions provided (verbal & written)   -Supplies and verbal/written Instructions for pre-surgical chlorhexadine shower provided*****  -Consent to be obtained by attending electrophysiologist on the scheduled procedure date            Dmitry now presents to [facility] PST for NICK guided LAAO procedure on with DR Gonzalez on 06/27/25     81 y/o M with PMHx of heart murmur, polycythemia, interstitial lung disease, HLD, HTN, aortic aneurysm, RA, L5 fracture, chronic afib on Eliquis,CHADS Vasc score of 4. appendectomy, recent history of perforated peptic ulcer admitted at [facility], required multiple blood transfusions, was also found to have duodenal bleeding which was clipped, post op infection requiring IV abx and drains, discharged to [facility] for rehab, presented to the ED on 05/19/25  after getting routine blood work that showed a hemoglobin of 6.6.  S/p 1 unit of pRBC on admission, Eliquis was hold due to GIB,  Eliquis resumed 5/23 - h/h stable CTA negative. for active bleed, He was discharge from  on 05/26/25  per GI no  endoscopic evaluation- as likely old ooze with enhancement from DOAC, catching up,  and no overt signs of GI bleed and hb now stable  Patient is pending  L5 kyphoplasty post LAAO procedure, once he si stable to be off Eliquis,  For his . Ascending thoracic aneurysm: Patient is stable and no longer being followed by Cardiothoracic Surgery (Dr. Blount).  patient endorses no afib symptoms, other than generalized fatigue, per patient, per pt, he is feeling better post discharge from  after being in rehab.  His hB/HCT has been stable post GIB.   His echo from 03/24 revealed preserved EF, severely dilated LA, moderate AR, mild MR, TR. His ST from 2023 revealed no evidence of infarction or inducible ischemia.  His  event monitor reading on 05/29/25 monitored for 15 days/10 hours) revealed AF burden 99%, No SR during monitoring period, 2 VT episodes with fastest  bpm, Hr in range of 25 to 205 bpm, PVC burden 1%  He ha chronic back pain, pending kyphoplasty. in near future once becomes optimized from cardiac stand point.   Pt now presents to [facility] PST for pre procedural evaluation prior to his upcoming LAAO procedure. on 06/27/25 with DR Gonzalez      Risk Stratification:  ASA:per anesthesia  Mallampati:per anesthesia      Plan/Recommendations:   -plan for LAAO procedure on 06/27/25 with DR Gonzalez  -patient seen and examined in PST on 06/19/25  -ECG and Labs reviewed  -NPO after midnight prior with exception of sip of water with morning medications  1 Continue BB, patsiium supplementation, sucralfate on the day of procedure with sips of water  -Hold Eliquis on 06/25, 06/26,a nd 6/27 in am   -Pre-procedure instructions provided (verbal & written)   -Supplies and verbal/written Instructions for pre-surgical chlorhexadine shower provided  -Consent to be obtained by attending electrophysiologist on the scheduled procedure date                 81 y/o M with PMHx of heart murmur, polycythemia, interstitial lung disease, HLD, HTN, aortic aneurysm, RA, L5 fracture, chronic afib on Eliquis,CHADS Vasc score of 4. appendectomy, recent history of perforated peptic ulcer admitted at [facility] [place], required multiple blood transfusions, was also found to have duodenal bleeding which was clipped, post op infection requiring IV abx and drains, discharged to [facility] for rehab, presented to the ED on 05/19/25  after getting routine blood work that showed a hemoglobin of 6.6.  S/p 1 unit of pRBC on admission, Eliquis was hold due to GIB,  Eliquis resumed 5/23 - h/h stable CTA negative. for active bleed, He was discharge from  on 05/26/25  per GI no  endoscopic evaluation- as likely old ooze with enhancement from DOAC, catching up,  and no overt signs of GI bleed and hb now stable  Patient is pending  L5 kyphoplasty post LAAO procedure, once he si stable to be off Eliquis,  For his . Ascending thoracic aneurysm: Patient is stable and no longer being followed by Cardiothoracic Surgery (Dr. Blount).  patient endorses no afib symptoms, other than generalized fatigue, per patient, per pt, he is feeling better post discharge from  after being in rehab.  His hB/HCT has been stable post GIB.   His echo from 03/24 revealed preserved EF, severely dilated LA, moderate AR, mild MR, TR. His ST from 2023 revealed no evidence of infarction or inducible ischemia.  His  event monitor reading on 05/29/25 monitored for 15 days/10 hours) revealed AF burden 99%, No SR during monitoring period, 2 VT episodes with fastest  bpm, Hr in range of 25 to 205 bpm, PVC burden 1%  He ha chronic back pain, pending kyphoplasty. in near future once becomes optimized from cardiac stand point.   Pt now presents to [facility] PST for pre procedural evaluation prior to his upcoming LAAO procedure. on 06/27/25 with DR Gonzalez      Risk Stratification:  ASA:per anesthesia  Mallampati:per anesthesia    Labs reviewed: Mg level 1.8, mg oxide 400mg po daily x4 doses ordered and made  the pt aware, encouraged mg rich odd intake  cr:       Plan/Recommendations:   -plan for LAAO procedure on 06/27/25 with DR Gonzalez  -patient seen and examined in PST on 06/19/25  -ECG and Labs reviewed  -NPO after midnight prior with exception of sip of water with morning medications  1 Continue BB, patsiium supplementation, sucralfate on the day of procedure with sips of water  -Hold Eliquis on 06/25, 06/26,a nd 6/27 in am   -Pre-procedure instructions provided (verbal & written)   -Supplies and verbal/written Instructions for pre-surgical chlorhexadine shower provided  -Consent to be obtained by attending electrophysiologist on the scheduled procedure date                 81 y/o M with PMHx of heart murmur, polycythemia, interstitial lung disease, HLD, HTN, aortic aneurysm, RA, L5 fracture, chronic afib on Eliquis,CHADS Vasc score of 4. appendectomy, recent history of perforated peptic ulcer admitted at [facility], required multiple blood transfusions, was also found to have duodenal bleeding which was clipped, post op infection requiring IV abx and drains, discharged to [facility] for rehab, presented to the ED on 05/19/25  after getting routine blood work that showed a hemoglobin of 6.6.  S/p 1 unit of pRBC on admission, Eliquis was hold due to GIB,  Eliquis resumed 5/23 - h/h stable CTA negative. for active bleed, He was discharge from  on 05/26/25  per GI no  endoscopic evaluation- as likely old ooze with enhancement from DOAC, catching up,  and no overt signs of GI bleed and hb now stable  Patient is pending  L5 kyphoplasty post LAAO procedure, once he si stable to be off Eliquis,  For his . Ascending thoracic aneurysm: Patient is stable and no longer being followed by Cardiothoracic Surgery (Dr. Blount).  patient endorses no afib symptoms, other than generalized fatigue, per patient, per pt, he is feeling better post discharge from  after being in rehab.  His hB/HCT has been stable post GIB.   His echo from 03/24 revealed preserved EF, severely dilated LA, moderate AR, mild MR, TR. His ST from 2023 revealed no evidence of infarction or inducible ischemia.  His  event monitor reading on 05/29/25 monitored for 15 days/10 hours) revealed AF burden 99%, No SR during monitoring period, 2 VT episodes with fastest  bpm, Hr in range of 25 to 205 bpm, PVC burden 1%  He ha chronic back pain, pending kyphoplasty. in near future once becomes optimized from cardiac stand point.   Pt now presents to [facility] PST for pre procedural evaluation prior to his upcoming LAAO procedure. on 06/27/25 with DR Gonzalez      Risk Stratification:  ASA:per anesthesia  Mallampati:per anesthesia    Labs reviewed: Mg level 1.8, mg oxide 400mg po daily x4 doses ordered and made  the pt aware, encouraged mg rich odd intake  cr: 1.96, hx of renal insufficeincy, Advised pt to follow up with PCP for nephrology consult psot procedure       Plan/Recommendations:   -plan for LAAO procedure on 06/27/25 with DR Gonzalez  -patient seen and examined in PST on 06/19/25  -ECG and Labs reviewed  -NPO after midnight prior with exception of sip of water with morning medications  1 Continue BB, patsiium supplementation, sucralfate on the day of procedure with sips of water  -Hold Eliquis on 06/25, 06/26,a nd 6/27 in am   -Pre-procedure instructions provided (verbal & written)   -Supplies and verbal/written Instructions for pre-surgical chlorhexadine shower provided  -Consent to be obtained by attending electrophysiologist on the scheduled procedure date                 79 y/o M with PMHx of heart murmur, polycythemia, interstitial lung disease, HLD, HTN, aortic aneurysm, RA, L5 fracture, chronic afib on Eliquis,CHADS Vasc score of 4. appendectomy, recent history of perforated peptic ulcer admitted at [facility], required multiple blood transfusions, was also found to have duodenal bleeding which was clipped, post op infection requiring IV abx and drains, discharged to [facility] for rehab, presented to the ED on 05/19/25  after getting routine blood work that showed a hemoglobin of 6.6.  S/p 1 unit of pRBC on admission, Eliquis was hold due to GIB,  Eliquis resumed 5/23 - h/h stable CTA negative. for active bleed, He was discharge from  on 05/26/25  per GI no  endoscopic evaluation- as likely old ooze with enhancement from DOAC, catching up,  and no overt signs of GI bleed and hb now stable  Patient is pending  L5 kyphoplasty post LAAO procedure, once he si stable to be off Eliquis,  For his . Ascending thoracic aneurysm: Patient is stable and no longer being followed by Cardiothoracic Surgery (Dr. Blount).  patient endorses no afib symptoms, other than generalized fatigue, per patient, per pt, he is feeling better post discharge from  after being in rehab.  His hB/HCT has been stable post GIB.   His echo from 03/24 revealed preserved EF, severely dilated LA, moderate AR, mild MR, TR. His ST from 2023 revealed no evidence of infarction or inducible ischemia.  His  event monitor reading on 05/29/25 monitored for 15 days/10 hours) revealed AF burden 99%, No SR during monitoring period, 2 VT episodes with fastest  bpm, Hr in range of 25 to 205 bpm, PVC burden 1%  He ha chronic back pain, pending kyphoplasty. in near future once becomes optimized from cardiac stand point.   Pt now presents to [facility] PST for pre procedural evaluation prior to his upcoming LAAO procedure. on 06/27/25 with DR Gonzalez      Risk Stratification:  ASA:per anesthesia  Mallampati:per anesthesia    Labs reviewed: Mg level 1.8, mg oxide 400mg po daily x4 doses ordered and made  the pt aware, encouraged mg rich odd intake        Plan/Recommendations:   -plan for LAAO procedure on 06/27/25 with DR Gonzalez  -patient seen and examined in PST on 06/19/25  -ECG and Labs reviewed  -NPO after midnight prior with exception of sip of water with morning medications  1 Continue BB, patsiium supplementation, sucralfate on the day of procedure with sips of water  -Hold Eliquis on 06/25, 06/26,a nd 6/27 in am   -Pre-procedure instructions provided (verbal & written)   -Supplies and verbal/written Instructions for pre-surgical chlorhexadine shower provided  -Consent to be obtained by attending electrophysiologist on the scheduled procedure date

## 2025-06-19 NOTE — H&P PST ADULT - HISTORY OF PRESENT ILLNESS
81 y/o M with PMHx of heart murmur, polycythemia, interstitial lung disease, HLD, HTN, aortic aneurysm, RA, L5 fracture, chronic afib on Eliquis,CHADS Vasc score of 4. appendectomy, recent history of perforated peptic ulcer admitted at [facility] [place], required multiple blood transfusions, was also found to have duodenal bleeding which was clipped, post op infection requiring IV abx and drains, discharged to [facility] for rehab, presented to the ED on 05/19/25  after getting routine blood work that showed a hemoglobin of 6.6.  S/p 1 unit of pRBC on admission, WEliquis was hold dure to GIB,  eliquis resumed 5/23 - h/h stable CTA negative. for active bleed, He was discharge from  on 05/26/25  per GI no  endoscopic evaluation- as likely old ooze with enhancement from DOAC, catching up,  and no overt signs of GI bleed and hb now stable  Ptaient is pending  L5 kyphoplasty post LAAO procedure, once he si stable to be off Eliquis,  For his . Ascending thoracic aneurysm: Patient is stable and no longer being followed by Cardiothoracic Surgery (Dr. Blount).  PATIENT'S hB/HCT has been stable post GIB.                                                                               Department of Cardiology                                                                  [facility]/[place] [address]                                                            Telephone: [phone]. Fax:[phone]                                                                             Pre-EP Procedure H and P      Chief complaint:    Patient is a 80y old  Male who presents with a chief complaint of     HPI:        Heart Failure:        Systolic/Diastolic/Combined:        NYHA Class (within 2 weeks):     Echo:     Prior EP Procedures:    Prior IC Procedures:    Prior CTS:         Antiarrhythmic Therapies:      Anticoagulation Therapies:      	  MEDICATIONS:                    ROS: as stated above, otherwise negative    PHYSICAL EXAM:  Constitutional: A & O x 3  HEENT:   Normal oral mucosa, PERRL, EOMI	  Cardiovascular: Normal S1 S2, No JVD, *****No murmurs  Respiratory: Lungs clear to auscultation	****  Gastrointestinal:  Soft, Non-tender, + BS	  Skin: No rashes, No ecchymoses, No cyanosis  Neurologic: Non-focal  Extremities: Normal range of motion, no edema****  Vascular: Peripheral pulses palpable + bilaterally ****      T(C): --  HR: --  BP: --  RR: --  SpO2: --  Wt(kg): --        ECG:  	    LABS:	 	                            I   	                                                                              Department of Cardiology                                                                  [facility]/[place] [address] [address]                                                            Telephone: [phone]. Fax:[phone]                                                                             Pre-EP Procedure H and P      Chief complaint:      Patient is a 80y old  Male who presents with a chief complaint of Afib    HPI:    79 y/o M with PMHx of heart murmur, polycythemia, interstitial lung disease, HLD, HTN, aortic aneurysm, RA, L5 fracture, chronic afib on Eliquis,CHADS Vasc score of 4. appendectomy, recent history of perforated peptic ulcer admitted at [facility] [place], required multiple blood transfusions, was also found to have duodenal bleeding which was clipped, post op infection requiring IV abx and drains, discharged to [facility] for rehab, presented to the ED on 05/19/25  after getting routine blood work that showed a hemoglobin of 6.6.  S/p 1 unit of pRBC on admission, WEliquis was hold dure to GIB,  eliquis resumed 5/23 - h/h stable CTA negative. for active bleed, He was discharge from  on 05/26/25  per GI no  endoscopic evaluation- as likely old ooze with enhancement from DOAC, catching up,  and no overt signs of GI bleed and hb now stable  Patient is pending  L5 kyphoplasty post LAAO procedure, once he si stable to be off Eliquis,  For his . Ascending thoracic aneurysm: Patient is stable and no longer being followed by Cardiothoracic Surgery (Dr. Blount).  patient endorses no afib symptoms, other than generalized fatigue, per patient, per pt, he is feeling better post discharge from  after being in rehab.  His hB/HCT has been stable post GIB.   His echo from 03/24 revealed preserved EF, severely dilated LA, moderate AR, mild MR, TR. His ST from 2023 revealed no evidence of infarction or inducible ischemia.  He ha chronic back pain, pending kyphoplasty.   He denies HA, dizziness, SOB, CP, palpations hemoptysis, hematemesis, hematochezia melena,   Pt now presents to [facility] PST for pre procedural evaluation prior to his upcoming LAAO procedure. on 06/27/25 with DR Gonzalez      Heart Failure:        Systolic/Diastolic/Combined: no       NYHA Class (within 2 weeks): no    Echo: 03/15/24  Left ventricular cavity is mildly dilated. Left ventricular systolic function is normal with an ejection fraction visually estimated at 55 to 60 %.  . Mild left ventricular hypertrophy.   Mildly enlarged right ventricular cavity size and normal systolic function.  The left atrium is severely dilated.   The right atrium is mildly dilated.  . Aortic root at the sinuses of Valsalva is dilated, measuring 4.85 cm (indexed 2.25 cm/m²). Ascending aorta diameter is dilated, measuring 4.70 cm (indexed 2.18 cm/m²).   Fibrocalcific aortic valve sclerosis without stenosis.  . Moderate aortic regurgitation.  9. Thickened mitral valve leaflets.  10. Mild mitral regurgitation.  11. Mild tricuspid regurgitation.  12. Estimated pulmonary artery systolic pressure is 39 mmHg, consistent with mild pulmonary hypertension.  13. No pericardial effusion seen.      CTA chest 12/20/2024  Aortic root measures up to 5.3 cm, unchanged.  Stable basilar predominant peripheral reticulations and traction bronchiectasis representing interstitial lung disease.  Stable mildly enlarged mediastinal/hilar lymph nodes.  New indeterminant sclerotic lesion within T1 vertebral body.    NST on 10/2023    Normal myocardial perfusion scan, with no evidence of infarction or inducible ischemia.  2. The left ventricle is normal in function and normal in size. The left ventricular EF during stress is 54 %.  3. Stress electrocardiogram: No ischemic ST segment changes.  4. Normal pharmocologic heart rate response.  5. Normal pharmocologic blood pressure response.  6. Arrhythmias: There is occasional premature ventricular contractions noted during stress.  7. This study was compared to prior study performed on 3/12/2019. There is no significant cali  Prior EP Procedures:    Prior IC Procedures:    Prior CTS:         Antiarrhythmic Therapies:      Anticoagulation Therapies:      	                      ROS: as stated above, otherwise negative    PHYSICAL EXAM:  Constitutional: A & O x 3  HEENT:   Normal oral mucosa, PERRL, EOMI	  Cardiovascular: Normal S1 S2, No JVD, No murmurs  Respiratory: Lungs clear to auscultation	  Gastrointestinal:  Soft, Non-tender, + BS	  Skin: No rashes, No ecchymoses, No cyanosis  Neurologic: Non-focal  Extremities: Normal range of motion, no edema  Vascular: Peripheral pulses palpable + bilaterally       T(C): --  HR: --  BP: --  RR: --  SpO2: --  Wt(kg): --                                    I   	                                                                              Department of Cardiology                                                                  [facility]/[place] [address] [address] [facility]                                                            Telephone: [phone]. Fax:[phone]                                                                             Pre-EP Procedure H and P      Chief complaint:      Patient is a 80y old  Male who presents with a chief complaint of Afib    HPI:    79 y/o Male who is a former smoker,  with PMHx of  RA, heart murmur, polycythemia, interstitial lung disease, HLD, HTN, aortic aneurysm, RA, L5 fracture, chronic afib on Eliquis,CHADS Vasc score of 4. appendectomy, recent history of perforated peptic [place] [facility] [place] required multiple blood transfusions, was also found to have duodenal bleeding which was clipped, post op infection requiring IV abx and drains, discharged to [facility] for rehab, presented to the ED on 05/19/25  after getting routine blood work that showed a hemoglobin of 6.6.  S/p 1 unit of pRBC on admission, Eliquis was hold due to GIB,  Eliquis resumed 5/23 - h/h stable CTA negative. for active bleed, He was discharge from  on 05/26/25  per GI no  endoscopic evaluation- as likely old ooze with enhancement from DOAC, catching up,  and no overt signs of GI bleed and hb now stable  Patient is pending  L5 kyphoplasty post LAAO procedure, once he si stable to be off Eliquis,  For his . Ascending thoracic aneurysm: Patient is stable and no longer being followed by Cardiothoracic Surgery (Dr. Blount).  patient endorses no afib symptoms, other than generalized fatigue, per patient, per pt, he is feeling better post discharge from  after being in rehab.  His hB/HCT has been stable post GIB.   His echo from 03/24 revealed preserved EF, severely dilated LA, moderate AR, mild MR, TR. His ST from 2023 revealed no evidence of infarction or inducible ischemia.  His  event monitor reading on 05/29/25 monitored for 15 days/10 hours) revealed AF burden 99%, No SR during monitoring period, 2 VT episodes with fastest  bpm, Hr in range of 25 to 205 bpm, PVC burden 1%  He ha chronic back pain, pending kyphoplasty. in near future once becomes optimized from cardiac stand point.  patient ambulates with walker at baseline and requires wheel chair assistance for long distance   He denies HA, dizziness, SOB, CP, palpations hemoptysis, hematemesis, hematochezia melena,   Pt now presents to [facility] PST for pre procedural evaluation prior to his upcoming LAAO procedure. on 06/27/25 with DR Gonzalez      Heart Failure:        Systolic/Diastolic/Combined: no       NYHA Class (within 2 weeks): no    Echo: 03/15/24  Left ventricular cavity is mildly dilated. Left ventricular systolic function is normal with an ejection fraction visually estimated at 55 to 60 %.  . Mild left ventricular hypertrophy.   Mildly enlarged right ventricular cavity size and normal systolic function.  The left atrium is severely dilated.   The right atrium is mildly dilated.  . Aortic root at the sinuses of Valsalva is dilated, measuring 4.85 cm (indexed 2.25 cm/m²). Ascending aorta diameter is dilated, measuring 4.70 cm (indexed 2.18 cm/m²).   Fibrocalcific aortic valve sclerosis without stenosis.  . Moderate aortic regurgitation.  9. Thickened mitral valve leaflets.  10. Mild mitral regurgitation.  11. Mild tricuspid regurgitation.  12. Estimated pulmonary artery systolic pressure is 39 mmHg, consistent with mild pulmonary hypertension.  13. No pericardial effusion seen.      CTA chest 12/20/2024  Aortic root measures up to 5.3 cm, unchanged.  Stable basilar predominant peripheral reticulations and traction bronchiectasis representing interstitial lung disease.  Stable mildly enlarged mediastinal/hilar lymph nodes.  New indeterminant sclerotic lesion within T1 vertebral body.    NST on 10/2023    Normal myocardial perfusion scan, with no evidence of infarction or inducible ischemia.  2. The left ventricle is normal in function and normal in size. The left ventricular EF during stress is 54 %.  3. Stress electrocardiogram: No ischemic ST segment changes.  4. Normal pharmocologic heart rate response.  5. Normal pharmocologic blood pressure response.  6. Arrhythmias: There is occasional premature ventricular contractions noted during stress.  7. This study was compared to prior study performed on 3/12/2019. There is no significant cali  Prior EP Procedures:    Prior IC Procedures:none    Prior CTS:none         Antiarrhythmic Therapies: none, on metoprolol succinate 50mg po daily for rate control     Anticoagulation Therapies: Eliquis 2.5mg po bid     	  ROS: as stated above, otherwise negative    PHYSICAL EXAM:  Constitutional: A & O x 3  HEENT:   Normal oral mucosa, PERRL, EOMI	  Cardiovascular: Normal S1 S2, No JVD, No murmurs  Respiratory: Lungs clear to auscultation	  Gastrointestinal:  Soft, Non-tender, + BS	  Skin: No rashes, No ecchymoses, No cyanosis  Neurologic: Non-focal  Extremities: Normal range of motion, +3 edema B/L ankles  Vascular: Peripheral pulses palpable + bilaterally     VITALS  \  T: 89F  HR: : 102/ REPEAT : 92 BPM  BP: --120/72 MM OF HG  RR: --18/MT   SpO2: 99%                                      I   	                                                                              Department of Cardiology                                                                  [facility]/[place] [address] [address] [facility] [facility]                                                            Telephone: [phone]. Fax:[phone]                                                                             Pre-EP Procedure H and P      Chief complaint:      Patient is a 80y old  Male who presents with a chief complaint of Afib    HPI:    81 y/o Male who is a former smoker,  with PMHx of  RA, heart murmur, polycythemia, interstitial lung disease, HLD, HTN, aortic aneurysm, RA, L5 fracture, chronic afib on Eliquis,CHADS Vasc score of 4. appendectomy, recent history of perforated peptic ulcer [place] at [facility] [place], required multiple blood transfusions, was also found to have duodenal bleeding which was clipped, post op infection requiring IV abx and drains, discharged to [facility] for rehab, presented to the ED on 05/19/25  after getting routine blood work that showed a hemoglobin of 6.6.  S/p 1 unit of pRBC on admission, Eliquis was hold due to GIB,  Eliquis resumed 5/23 - h/h stable CTA negative. for active bleed, He was discharge from  on 05/26/25  per GI no  endoscopic evaluation- as likely old ooze with enhancement from DOAC, catching up,  and no overt signs of GI bleed and hb now stable  Patient is pending  L5 kyphoplasty post LAAO procedure, once he si stable to be off Eliquis,  For his . Ascending thoracic aneurysm: Patient is stable and no longer being followed by Cardiothoracic Surgery (Dr. Blount).  patient endorses no afib symptoms, other than generalized fatigue, per patient, per pt, he is feeling better post discharge from  after being in rehab.  His hB/HCT has been stable post GIB.   His echo from 03/24 revealed preserved EF, severely dilated LA, moderate AR, mild MR, TR. His ST from 2023 revealed no evidence of infarction or inducible ischemia.  His  event monitor reading on 05/29/25 monitored for 15 days/10 hours) revealed AF burden 99%, No SR during monitoring period, 2 VT episodes with fastest  bpm, Hr in range of 25 to 205 bpm, PVC burden 1%  He ha chronic back pain, pending kyphoplasty. in near future once becomes optimized from cardiac stand point.  patient ambulates with walker at baseline and requires wheel chair assistance for long distance   He denies HA, dizziness, SOB, CP, palpations hemoptysis, hematemesis, hematochezia melena,   Pt now presents to [facility] PST for pre procedural evaluation prior to his upcoming LAAO procedure. on 06/27/25 with DR Gonzalez      Heart Failure:        Systolic/Diastolic/Combined: no       NYHA Class (within 2 weeks): no    Echo: 03/15/24  Left ventricular cavity is mildly dilated. Left ventricular systolic function is normal with an ejection fraction visually estimated at 55 to 60 %.  . Mild left ventricular hypertrophy.   Mildly enlarged right ventricular cavity size and normal systolic function.  The left atrium is severely dilated.   The right atrium is mildly dilated.  . Aortic root at the sinuses of Valsalva is dilated, measuring 4.85 cm (indexed 2.25 cm/m²). Ascending aorta diameter is dilated, measuring 4.70 cm (indexed 2.18 cm/m²).   Fibrocalcific aortic valve sclerosis without stenosis.  . Moderate aortic regurgitation.  9. Thickened mitral valve leaflets.  10. Mild mitral regurgitation.  11. Mild tricuspid regurgitation.  12. Estimated pulmonary artery systolic pressure is 39 mmHg, consistent with mild pulmonary hypertension.  13. No pericardial effusion seen.      CTA chest 12/20/2024  Aortic root measures up to 5.3 cm, unchanged.  Stable basilar predominant peripheral reticulations and traction bronchiectasis representing interstitial lung disease.  Stable mildly enlarged mediastinal/hilar lymph nodes.  New indeterminant sclerotic lesion within T1 vertebral body.    NST on 10/2023    Normal myocardial perfusion scan, with no evidence of infarction or inducible ischemia.  2. The left ventricle is normal in function and normal in size. The left ventricular EF during stress is 54 %.  3. Stress electrocardiogram: No ischemic ST segment changes.  4. Normal pharmocologic heart rate response.  5. Normal pharmocologic blood pressure response.  6. Arrhythmias: There is occasional premature ventricular contractions noted during stress.  7. This study was compared to prior study performed on 3/12/2019. There is no significant cali  Prior EP Procedures:    Prior IC Procedures:none    Prior CTS:none         Antiarrhythmic Therapies: none, on metoprolol succinate 50mg po daily for rate control     Anticoagulation Therapies: Eliquis 2.5mg po bid     	  ROS: as stated above, otherwise negative    PHYSICAL EXAM:  Constitutional: A & O x 3  HEENT:   Normal oral mucosa, PERRL, EOMI	  Cardiovascular: Normal S1 S2, No JVD, No murmurs  Respiratory: Lungs clear to auscultation	  Gastrointestinal:  Soft, Non-tender, + BS	  Skin: No rashes, No ecchymoses, No cyanosis  Neurologic: Non-focal  Extremities: Normal range of motion, +3 edema B/L ankles  Vascular: Peripheral pulses palpable + bilaterally     VITALS  \  T: 89F  HR: : 102/ REPEAT : 92 BPM  BP: --120/72 MM OF HG  RR: --18/MT   SpO2: 99%                            10.5   4.56  )-----------( 157      ( 19 Jun 2025 16:00 )             34.5           Comprehensive Metabolic Panel (06.19.25 @ 16:00)    Sodium: 136 mmol/L   Potassium: 4.8 mmol/L   Chloride: 98: Chloride reference range changed from ..10/26/2022  . mmol/L   Carbon Dioxide: 24.0 mmol/L   Anion Gap: 14 mmol/L   Blood Urea Nitrogen: 30.5 mg/dL   Creatinine: 1.16 mg/dL   Glucose: 93 mg/dL   Calcium: 9.3 mg/dL   Protein Total: 7.4 g/dL   Albumin: 3.8 g/dL   Bilirubin Total: 0.4 mg/dL   Alkaline Phosphatase: 95 U/L   Aspartate Aminotransferase (AST/SGOT): 20 U/L   Alanine Aminotransferase (ALT/SGPT): 8 U/L   eGFR: 63: The estimated glomerular filtration rate (eGFR) calculation is based on  the 2021 CKD-EPI creatinine equation, which is validated in male and  female population 18 years of age and older (N Engl J Med 2021;  385:8033-2602). mL/min/1.73m2                            I

## 2025-06-20 RX ORDER — MAGNESIUM OXIDE 400 MG
1 TABLET ORAL
Qty: 4 | Refills: 0
Start: 2025-06-20

## 2025-06-23 ENCOUNTER — OUTPATIENT (OUTPATIENT)
Dept: OUTPATIENT SERVICES | Facility: HOSPITAL | Age: 80
LOS: 1 days | End: 2025-06-23
Payer: MEDICARE

## 2025-06-23 ENCOUNTER — APPOINTMENT (OUTPATIENT)
Dept: CT IMAGING | Facility: CLINIC | Age: 80
End: 2025-06-23
Payer: MEDICARE

## 2025-06-23 DIAGNOSIS — Z90.49 ACQUIRED ABSENCE OF OTHER SPECIFIED PARTS OF DIGESTIVE TRACT: Chronic | ICD-10-CM

## 2025-06-23 DIAGNOSIS — Z98.890 OTHER SPECIFIED POSTPROCEDURAL STATES: Chronic | ICD-10-CM

## 2025-06-23 DIAGNOSIS — Z98.49 CATARACT EXTRACTION STATUS, UNSPECIFIED EYE: Chronic | ICD-10-CM

## 2025-06-23 DIAGNOSIS — I48.91 UNSPECIFIED ATRIAL FIBRILLATION: ICD-10-CM

## 2025-06-23 PROCEDURE — 75572 CT HRT W/3D IMAGE: CPT | Mod: 26

## 2025-06-23 PROCEDURE — 75572 CT HRT W/3D IMAGE: CPT

## 2025-07-10 ENCOUNTER — APPOINTMENT (OUTPATIENT)
Dept: ELECTROPHYSIOLOGY | Facility: CLINIC | Age: 80
End: 2025-07-10

## 2025-07-11 ENCOUNTER — TRANSCRIPTION ENCOUNTER (OUTPATIENT)
Age: 80
End: 2025-07-11

## 2025-07-11 ENCOUNTER — INPATIENT (INPATIENT)
Facility: HOSPITAL | Age: 80
LOS: 0 days | Discharge: HOME | DRG: 274 | End: 2025-07-12
Payer: MEDICARE

## 2025-07-11 VITALS
HEIGHT: 72 IN | DIASTOLIC BLOOD PRESSURE: 66 MMHG | RESPIRATION RATE: 15 BRPM | TEMPERATURE: 98 F | SYSTOLIC BLOOD PRESSURE: 121 MMHG | OXYGEN SATURATION: 100 % | WEIGHT: 184.97 LBS | HEART RATE: 84 BPM

## 2025-07-11 DIAGNOSIS — Z90.49 ACQUIRED ABSENCE OF OTHER SPECIFIED PARTS OF DIGESTIVE TRACT: Chronic | ICD-10-CM

## 2025-07-11 DIAGNOSIS — Z98.890 OTHER SPECIFIED POSTPROCEDURAL STATES: Chronic | ICD-10-CM

## 2025-07-11 DIAGNOSIS — Z98.49 CATARACT EXTRACTION STATUS, UNSPECIFIED EYE: Chronic | ICD-10-CM

## 2025-07-11 DIAGNOSIS — I48.0 PAROXYSMAL ATRIAL FIBRILLATION: ICD-10-CM

## 2025-07-11 LAB
ANION GAP SERPL CALC-SCNC: 14 MMOL/L — SIGNIFICANT CHANGE UP (ref 5–17)
APTT BLD: 35.4 SEC — SIGNIFICANT CHANGE UP (ref 26.1–36.8)
BLD GP AB SCN SERPL QL: SIGNIFICANT CHANGE UP
BUN SERPL-MCNC: 32.8 MG/DL — HIGH (ref 8–20)
CALCIUM SERPL-MCNC: 9.3 MG/DL — SIGNIFICANT CHANGE UP (ref 8.4–10.5)
CHLORIDE SERPL-SCNC: 99 MMOL/L — SIGNIFICANT CHANGE UP (ref 96–108)
CO2 SERPL-SCNC: 23 MMOL/L — SIGNIFICANT CHANGE UP (ref 22–29)
CREAT SERPL-MCNC: 1.24 MG/DL — SIGNIFICANT CHANGE UP (ref 0.5–1.3)
EGFR: 58 ML/MIN/1.73M2 — LOW
EGFR: 58 ML/MIN/1.73M2 — LOW
GLUCOSE SERPL-MCNC: 97 MG/DL — SIGNIFICANT CHANGE UP (ref 70–99)
HCT VFR BLD CALC: 32.2 % — LOW (ref 39–50)
HGB BLD-MCNC: 10.1 G/DL — LOW (ref 13–17)
INR BLD: 1.51 RATIO — HIGH (ref 0.85–1.16)
MAGNESIUM SERPL-MCNC: 1.7 MG/DL — SIGNIFICANT CHANGE UP (ref 1.6–2.6)
MCHC RBC-ENTMCNC: 27.3 PG — SIGNIFICANT CHANGE UP (ref 27–34)
MCHC RBC-ENTMCNC: 31.4 G/DL — LOW (ref 32–36)
MCV RBC AUTO: 87 FL — SIGNIFICANT CHANGE UP (ref 80–100)
NRBC # BLD AUTO: 0 K/UL — SIGNIFICANT CHANGE UP (ref 0–0)
NRBC # FLD: 0 K/UL — SIGNIFICANT CHANGE UP (ref 0–0)
NRBC BLD AUTO-RTO: 0 /100 WBCS — SIGNIFICANT CHANGE UP (ref 0–0)
PLATELET # BLD AUTO: 131 K/UL — LOW (ref 150–400)
PMV BLD: 8.6 FL — SIGNIFICANT CHANGE UP (ref 7–13)
POTASSIUM SERPL-MCNC: 4.5 MMOL/L — SIGNIFICANT CHANGE UP (ref 3.5–5.3)
POTASSIUM SERPL-SCNC: 4.5 MMOL/L — SIGNIFICANT CHANGE UP (ref 3.5–5.3)
PROTHROM AB SERPL-ACNC: 17.5 SEC — HIGH (ref 9.9–13.4)
RBC # BLD: 3.7 M/UL — LOW (ref 4.2–5.8)
RBC # FLD: 15 % — HIGH (ref 10.3–14.5)
SODIUM SERPL-SCNC: 136 MMOL/L — SIGNIFICANT CHANGE UP (ref 135–145)
WBC # BLD: 3.73 K/UL — LOW (ref 3.8–10.5)
WBC # FLD AUTO: 3.73 K/UL — LOW (ref 3.8–10.5)

## 2025-07-11 PROCEDURE — 80048 BASIC METABOLIC PNL TOTAL CA: CPT

## 2025-07-11 PROCEDURE — 86900 BLOOD TYPING SEROLOGIC ABO: CPT

## 2025-07-11 PROCEDURE — 83735 ASSAY OF MAGNESIUM: CPT

## 2025-07-11 PROCEDURE — 85610 PROTHROMBIN TIME: CPT

## 2025-07-11 PROCEDURE — 33340 PERQ CLSR TCAT L ATR APNDGE: CPT

## 2025-07-11 PROCEDURE — 94640 AIRWAY INHALATION TREATMENT: CPT

## 2025-07-11 PROCEDURE — 86901 BLOOD TYPING SEROLOGIC RH(D): CPT

## 2025-07-11 PROCEDURE — 85730 THROMBOPLASTIN TIME PARTIAL: CPT

## 2025-07-11 PROCEDURE — 85027 COMPLETE CBC AUTOMATED: CPT

## 2025-07-11 PROCEDURE — 36415 COLL VENOUS BLD VENIPUNCTURE: CPT

## 2025-07-11 PROCEDURE — 93010 ELECTROCARDIOGRAM REPORT: CPT

## 2025-07-11 PROCEDURE — 86850 RBC ANTIBODY SCREEN: CPT

## 2025-07-11 RX ORDER — ROSUVASTATIN CALCIUM 20 MG/1
20 TABLET, FILM COATED ORAL AT BEDTIME
Refills: 0 | Status: DISCONTINUED | OUTPATIENT
Start: 2025-07-11 | End: 2025-07-12

## 2025-07-11 RX ORDER — OXYCODONE HYDROCHLORIDE 30 MG/1
5 TABLET ORAL EVERY 6 HOURS
Refills: 0 | Status: DISCONTINUED | OUTPATIENT
Start: 2025-07-11 | End: 2025-07-12

## 2025-07-11 RX ORDER — MELATONIN 5 MG
3 TABLET ORAL AT BEDTIME
Refills: 0 | Status: DISCONTINUED | OUTPATIENT
Start: 2025-07-11 | End: 2025-07-12

## 2025-07-11 RX ORDER — MAGNESIUM SULFATE 500 MG/ML
2 SYRINGE (ML) INJECTION ONCE
Refills: 0 | Status: COMPLETED | OUTPATIENT
Start: 2025-07-11 | End: 2025-07-11

## 2025-07-11 RX ORDER — FUROSEMIDE 10 MG/ML
40 INJECTION INTRAMUSCULAR; INTRAVENOUS DAILY
Refills: 0 | Status: DISCONTINUED | OUTPATIENT
Start: 2025-07-11 | End: 2025-07-12

## 2025-07-11 RX ORDER — METOPROLOL SUCCINATE 50 MG/1
50 TABLET, EXTENDED RELEASE ORAL DAILY
Refills: 0 | Status: DISCONTINUED | OUTPATIENT
Start: 2025-07-11 | End: 2025-07-12

## 2025-07-11 RX ORDER — ALPRAZOLAM 0.5 MG
0.25 TABLET, EXTENDED RELEASE 24 HR ORAL EVERY 6 HOURS
Refills: 0 | Status: DISCONTINUED | OUTPATIENT
Start: 2025-07-11 | End: 2025-07-12

## 2025-07-11 RX ORDER — SUCRALFATE 1 G
1 TABLET ORAL EVERY 6 HOURS
Refills: 0 | Status: DISCONTINUED | OUTPATIENT
Start: 2025-07-11 | End: 2025-07-12

## 2025-07-11 RX ORDER — MAGNESIUM, ALUMINUM HYDROXIDE 200-200 MG
30 TABLET,CHEWABLE ORAL EVERY 4 HOURS
Refills: 0 | Status: DISCONTINUED | OUTPATIENT
Start: 2025-07-11 | End: 2025-07-12

## 2025-07-11 RX ORDER — FLUTICASONE PROPIONATE 50 UG/1
1 SPRAY, METERED NASAL
Refills: 0 | Status: DISCONTINUED | OUTPATIENT
Start: 2025-07-11 | End: 2025-07-12

## 2025-07-11 RX ORDER — ACETAMINOPHEN 500 MG/5ML
650 LIQUID (ML) ORAL EVERY 6 HOURS
Refills: 0 | Status: DISCONTINUED | OUTPATIENT
Start: 2025-07-11 | End: 2025-07-12

## 2025-07-11 RX ORDER — ONDANSETRON HCL/PF 4 MG/2 ML
4 VIAL (ML) INJECTION EVERY 6 HOURS
Refills: 0 | Status: DISCONTINUED | OUTPATIENT
Start: 2025-07-11 | End: 2025-07-12

## 2025-07-11 RX ORDER — MAGNESIUM SULFATE 500 MG/ML
2 SYRINGE (ML) INJECTION ONCE
Refills: 0 | Status: DISCONTINUED | OUTPATIENT
Start: 2025-07-11 | End: 2025-07-11

## 2025-07-11 RX ORDER — APIXABAN 2.5 MG/1
2.5 TABLET, FILM COATED ORAL
Refills: 0 | Status: DISCONTINUED | OUTPATIENT
Start: 2025-07-11 | End: 2025-07-12

## 2025-07-11 RX ORDER — FERROUS SULFATE 137(45) MG
325 TABLET, EXTENDED RELEASE ORAL DAILY
Refills: 0 | Status: DISCONTINUED | OUTPATIENT
Start: 2025-07-11 | End: 2025-07-12

## 2025-07-11 RX ORDER — LIDOCAINE HYDROCHLORIDE 20 MG/ML
1 JELLY TOPICAL DAILY
Refills: 0 | Status: DISCONTINUED | OUTPATIENT
Start: 2025-07-11 | End: 2025-07-12

## 2025-07-11 RX ADMIN — Medication 650 MILLIGRAM(S): at 22:52

## 2025-07-11 RX ADMIN — ROSUVASTATIN CALCIUM 20 MILLIGRAM(S): 20 TABLET, FILM COATED ORAL at 22:53

## 2025-07-11 RX ADMIN — LIDOCAINE HYDROCHLORIDE 1 PATCH: 20 JELLY TOPICAL at 19:38

## 2025-07-11 RX ADMIN — Medication 25 GRAM(S): at 18:27

## 2025-07-11 RX ADMIN — OXYCODONE HYDROCHLORIDE 5 MILLIGRAM(S): 30 TABLET ORAL at 23:53

## 2025-07-11 RX ADMIN — Medication 650 MILLIGRAM(S): at 23:52

## 2025-07-11 RX ADMIN — Medication 1 GRAM(S): at 19:34

## 2025-07-11 RX ADMIN — OXYCODONE HYDROCHLORIDE 5 MILLIGRAM(S): 30 TABLET ORAL at 22:53

## 2025-07-11 RX ADMIN — Medication 40 MILLIGRAM(S): at 19:34

## 2025-07-11 RX ADMIN — FLUTICASONE PROPIONATE 1 SPRAY(S): 50 SPRAY, METERED NASAL at 19:37

## 2025-07-11 RX ADMIN — OXYCODONE HYDROCHLORIDE 5 MILLIGRAM(S): 30 TABLET ORAL at 18:45

## 2025-07-11 RX ADMIN — OXYCODONE HYDROCHLORIDE 5 MILLIGRAM(S): 30 TABLET ORAL at 17:32

## 2025-07-11 RX ADMIN — APIXABAN 2.5 MILLIGRAM(S): 2.5 TABLET, FILM COATED ORAL at 19:38

## 2025-07-11 RX ADMIN — Medication 3 MILLIGRAM(S): at 22:53

## 2025-07-12 ENCOUNTER — TRANSCRIPTION ENCOUNTER (OUTPATIENT)
Age: 80
End: 2025-07-12

## 2025-07-12 VITALS
DIASTOLIC BLOOD PRESSURE: 63 MMHG | TEMPERATURE: 98 F | RESPIRATION RATE: 18 BRPM | SYSTOLIC BLOOD PRESSURE: 104 MMHG | OXYGEN SATURATION: 100 % | HEART RATE: 74 BPM

## 2025-07-12 LAB
ANION GAP SERPL CALC-SCNC: 12 MMOL/L — SIGNIFICANT CHANGE UP (ref 5–17)
BUN SERPL-MCNC: 32.4 MG/DL — HIGH (ref 8–20)
CALCIUM SERPL-MCNC: 9.2 MG/DL — SIGNIFICANT CHANGE UP (ref 8.4–10.5)
CHLORIDE SERPL-SCNC: 98 MMOL/L — SIGNIFICANT CHANGE UP (ref 96–108)
CO2 SERPL-SCNC: 25 MMOL/L — SIGNIFICANT CHANGE UP (ref 22–29)
CREAT SERPL-MCNC: 1.25 MG/DL — SIGNIFICANT CHANGE UP (ref 0.5–1.3)
EGFR: 57 ML/MIN/1.73M2 — LOW
EGFR: 57 ML/MIN/1.73M2 — LOW
GLUCOSE SERPL-MCNC: 109 MG/DL — HIGH (ref 70–99)
HCT VFR BLD CALC: 33.5 % — LOW (ref 39–50)
HGB BLD-MCNC: 10.2 G/DL — LOW (ref 13–17)
MAGNESIUM SERPL-MCNC: 2.3 MG/DL — SIGNIFICANT CHANGE UP (ref 1.6–2.6)
MCHC RBC-ENTMCNC: 26.6 PG — LOW (ref 27–34)
MCHC RBC-ENTMCNC: 30.4 G/DL — LOW (ref 32–36)
MCV RBC AUTO: 87.5 FL — SIGNIFICANT CHANGE UP (ref 80–100)
NRBC # BLD AUTO: 0 K/UL — SIGNIFICANT CHANGE UP (ref 0–0)
NRBC # FLD: 0 K/UL — SIGNIFICANT CHANGE UP (ref 0–0)
NRBC BLD AUTO-RTO: 0 /100 WBCS — SIGNIFICANT CHANGE UP (ref 0–0)
PLATELET # BLD AUTO: 127 K/UL — LOW (ref 150–400)
PMV BLD: 9.5 FL — SIGNIFICANT CHANGE UP (ref 7–13)
POTASSIUM SERPL-MCNC: 4.8 MMOL/L — SIGNIFICANT CHANGE UP (ref 3.5–5.3)
POTASSIUM SERPL-SCNC: 4.8 MMOL/L — SIGNIFICANT CHANGE UP (ref 3.5–5.3)
RBC # BLD: 3.83 M/UL — LOW (ref 4.2–5.8)
RBC # FLD: 15.3 % — HIGH (ref 10.3–14.5)
SODIUM SERPL-SCNC: 135 MMOL/L — SIGNIFICANT CHANGE UP (ref 135–145)
WBC # BLD: 4.35 K/UL — SIGNIFICANT CHANGE UP (ref 3.8–10.5)
WBC # FLD AUTO: 4.35 K/UL — SIGNIFICANT CHANGE UP (ref 3.8–10.5)

## 2025-07-12 PROCEDURE — 93010 ELECTROCARDIOGRAM REPORT: CPT

## 2025-07-12 PROCEDURE — 85027 COMPLETE CBC AUTOMATED: CPT

## 2025-07-12 PROCEDURE — 83735 ASSAY OF MAGNESIUM: CPT

## 2025-07-12 PROCEDURE — C1769: CPT

## 2025-07-12 PROCEDURE — 36415 COLL VENOUS BLD VENIPUNCTURE: CPT

## 2025-07-12 PROCEDURE — C1887: CPT

## 2025-07-12 PROCEDURE — C9399: CPT

## 2025-07-12 PROCEDURE — 85610 PROTHROMBIN TIME: CPT

## 2025-07-12 PROCEDURE — 93005 ELECTROCARDIOGRAM TRACING: CPT

## 2025-07-12 PROCEDURE — 86901 BLOOD TYPING SEROLOGIC RH(D): CPT

## 2025-07-12 PROCEDURE — 80048 BASIC METABOLIC PNL TOTAL CA: CPT

## 2025-07-12 PROCEDURE — C1893: CPT

## 2025-07-12 PROCEDURE — 99238 HOSP IP/OBS DSCHRG MGMT 30/<: CPT

## 2025-07-12 PROCEDURE — 86900 BLOOD TYPING SEROLOGIC ABO: CPT

## 2025-07-12 PROCEDURE — C1759: CPT

## 2025-07-12 PROCEDURE — 33340 PERQ CLSR TCAT L ATR APNDGE: CPT

## 2025-07-12 PROCEDURE — 94640 AIRWAY INHALATION TREATMENT: CPT

## 2025-07-12 PROCEDURE — 85730 THROMBOPLASTIN TIME PARTIAL: CPT

## 2025-07-12 PROCEDURE — 86850 RBC ANTIBODY SCREEN: CPT

## 2025-07-12 PROCEDURE — C1894: CPT

## 2025-07-12 PROCEDURE — C1889: CPT

## 2025-07-12 RX ORDER — MAGNESIUM SULFATE 500 MG/ML
2 SYRINGE (ML) INJECTION ONCE
Refills: 0 | Status: COMPLETED | OUTPATIENT
Start: 2025-07-12 | End: 2025-07-12

## 2025-07-12 RX ADMIN — Medication 40 MILLIGRAM(S): at 05:46

## 2025-07-12 RX ADMIN — FUROSEMIDE 40 MILLIGRAM(S): 10 INJECTION INTRAMUSCULAR; INTRAVENOUS at 05:46

## 2025-07-12 RX ADMIN — Medication 1 GRAM(S): at 00:15

## 2025-07-12 RX ADMIN — OXYCODONE HYDROCHLORIDE 5 MILLIGRAM(S): 30 TABLET ORAL at 05:56

## 2025-07-12 RX ADMIN — LIDOCAINE HYDROCHLORIDE 1 PATCH: 20 JELLY TOPICAL at 07:00

## 2025-07-12 RX ADMIN — FLUTICASONE PROPIONATE 1 SPRAY(S): 50 SPRAY, METERED NASAL at 05:47

## 2025-07-12 RX ADMIN — METOPROLOL SUCCINATE 50 MILLIGRAM(S): 50 TABLET, EXTENDED RELEASE ORAL at 05:46

## 2025-07-12 RX ADMIN — Medication 1 GRAM(S): at 05:46

## 2025-07-12 RX ADMIN — Medication 325 MILLIGRAM(S): at 10:45

## 2025-07-12 RX ADMIN — Medication 650 MILLIGRAM(S): at 12:41

## 2025-07-12 RX ADMIN — LIDOCAINE HYDROCHLORIDE 1 PATCH: 20 JELLY TOPICAL at 10:45

## 2025-07-12 RX ADMIN — Medication 1 GRAM(S): at 11:51

## 2025-07-12 RX ADMIN — APIXABAN 2.5 MILLIGRAM(S): 2.5 TABLET, FILM COATED ORAL at 08:03

## 2025-07-12 RX ADMIN — Medication 20 MILLIEQUIVALENT(S): at 10:45

## 2025-07-18 ENCOUNTER — APPOINTMENT (OUTPATIENT)
Dept: ORTHOPEDIC SURGERY | Facility: CLINIC | Age: 80
End: 2025-07-18
Payer: MEDICARE

## 2025-07-18 ENCOUNTER — APPOINTMENT (OUTPATIENT)
Dept: ELECTROPHYSIOLOGY | Facility: CLINIC | Age: 80
End: 2025-07-18

## 2025-07-18 VITALS — WEIGHT: 185 LBS | BODY MASS INDEX: 25.06 KG/M2 | HEIGHT: 72 IN

## 2025-07-18 VITALS
BODY MASS INDEX: 25.06 KG/M2 | HEIGHT: 72 IN | DIASTOLIC BLOOD PRESSURE: 70 MMHG | WEIGHT: 185 LBS | HEART RATE: 99 BPM | SYSTOLIC BLOOD PRESSURE: 127 MMHG

## 2025-07-18 PROCEDURE — 99215 OFFICE O/P EST HI 40 MIN: CPT

## 2025-07-18 PROCEDURE — 72100 X-RAY EXAM L-S SPINE 2/3 VWS: CPT

## 2025-07-25 ENCOUNTER — APPOINTMENT (OUTPATIENT)
Dept: ELECTROPHYSIOLOGY | Facility: CLINIC | Age: 80
End: 2025-07-25

## 2025-07-28 ENCOUNTER — OFFICE (OUTPATIENT)
Dept: URBAN - METROPOLITAN AREA CLINIC 115 | Facility: CLINIC | Age: 80
Setting detail: OPHTHALMOLOGY
End: 2025-07-28
Payer: MEDICARE

## 2025-07-28 DIAGNOSIS — H43.811: ICD-10-CM

## 2025-07-28 DIAGNOSIS — Z96.1: ICD-10-CM

## 2025-07-28 DIAGNOSIS — H04.123: ICD-10-CM

## 2025-07-28 DIAGNOSIS — H26.493: ICD-10-CM

## 2025-07-28 DIAGNOSIS — H18.20: ICD-10-CM

## 2025-07-28 PROCEDURE — 99213 OFFICE O/P EST LOW 20 MIN: CPT | Performed by: OPHTHALMOLOGY

## 2025-07-28 ASSESSMENT — REFRACTION_CURRENTRX
OS_SPHERE: -7.25
OD_VPRISM_DIRECTION: SV
OD_OVR_VA: 20/
OS_ADD: +1.50
OD_SPHERE: -7.00
OD_VPRISM_DIRECTION: SV
OD_AXIS: 085
OD_OVR_VA: 20/
OS_VPRISM_DIRECTION: SV
OS_VPRISM_DIRECTION: SV
OS_AXIS: 025
OS_OVR_VA: 20/
OD_SPHERE: +2.00
OS_CYLINDER: -0.25
OS_SPHERE: +2.00
OD_CYLINDER: -0.25
OD_ADD: +1.50
OS_OVR_VA: 20/

## 2025-07-28 ASSESSMENT — REFRACTION_AUTOREFRACTION
OS_SPHERE: UTP
OD_CYLINDER: -0.50
OD_AXIS: 080
OD_SPHERE: +1.00

## 2025-07-28 ASSESSMENT — REFRACTION_MANIFEST
OD_ADD: +1.25
OD_SPHERE: -7.00
OS_VA2: 20/20
OD_VA1: 20/20-2
OS_SPHERE: PLANO
OD_VA1: 20/40-
OS_AXIS: 025
OD_CYLINDER: -0.25
OS_ADD: +1.25
OD_AXIS: 085
OS_SPHERE: -7.25
OS_VA1: 20/20-1
OS_CYLINDER: -0.25
OS_ADD: +1.50
OD_ADD: +1.50
OS_VA1: 20/70-
OD_SPHERE: PLANO
OD_VA2: 20/20

## 2025-07-28 ASSESSMENT — CORNEAL EDEMA - FOLDS/STRIAE: OS_FOLDSSTRIAE: 1+

## 2025-07-28 ASSESSMENT — TONOMETRY: OD_IOP_MMHG: 11

## 2025-07-28 ASSESSMENT — CORNEAL EDEMA CLINICAL DESCRIPTION: OS_CORNEALEDEMA: 1+

## 2025-07-28 ASSESSMENT — VISUAL ACUITY
OS_BCVA: 20/40
OD_BCVA: 20/250

## 2025-07-28 ASSESSMENT — CONFRONTATIONAL VISUAL FIELD TEST (CVF)
OS_FINDINGS: FULL
OD_FINDINGS: FULL

## 2025-07-28 ASSESSMENT — CORNEAL SURGICAL SCARRING: OS_SCARRING: MID

## 2025-08-02 ENCOUNTER — OFFICE (OUTPATIENT)
Dept: URBAN - METROPOLITAN AREA CLINIC 115 | Facility: CLINIC | Age: 80
Setting detail: OPHTHALMOLOGY
End: 2025-08-02
Payer: MEDICARE

## 2025-08-02 DIAGNOSIS — H04.123: ICD-10-CM

## 2025-08-02 DIAGNOSIS — H26.493: ICD-10-CM

## 2025-08-02 DIAGNOSIS — Z96.1: ICD-10-CM

## 2025-08-02 DIAGNOSIS — H18.20: ICD-10-CM

## 2025-08-02 PROCEDURE — 92012 INTRM OPH EXAM EST PATIENT: CPT | Performed by: OPHTHALMOLOGY

## 2025-08-02 ASSESSMENT — VISUAL ACUITY
OS_BCVA: 20/30-1
OD_BCVA: 20/150

## 2025-08-02 ASSESSMENT — CORNEAL EDEMA - FOLDS/STRIAE: OS_FOLDSSTRIAE: T 1+

## 2025-08-02 ASSESSMENT — REFRACTION_CURRENTRX
OD_OVR_VA: 20/
OS_AXIS: 025
OD_SPHERE: +2.00
OD_OVR_VA: 20/
OS_OVR_VA: 20/
OS_VPRISM_DIRECTION: SV
OS_SPHERE: -7.25
OD_CYLINDER: -0.25
OD_VPRISM_DIRECTION: SV
OS_ADD: +1.50
OS_VPRISM_DIRECTION: SV
OD_AXIS: 085
OS_SPHERE: +2.00
OD_VPRISM_DIRECTION: SV
OD_ADD: +1.50
OS_OVR_VA: 20/
OD_SPHERE: -7.00
OS_CYLINDER: -0.25

## 2025-08-02 ASSESSMENT — REFRACTION_MANIFEST
OS_VA1: 20/20-1
OS_SPHERE: -7.25
OD_VA1: 20/40-
OS_AXIS: 025
OS_VA1: 20/70-
OD_VA2: 20/20
OD_VA1: 20/20-2
OS_VA2: 20/20
OD_AXIS: 085
OS_SPHERE: PLANO
OS_ADD: +1.50
OD_SPHERE: PLANO
OS_CYLINDER: -0.25
OD_ADD: +1.50
OD_ADD: +1.25
OS_ADD: +1.25
OD_CYLINDER: -0.25
OD_SPHERE: -7.00

## 2025-08-02 ASSESSMENT — CORNEAL SURGICAL SCARRING: OS_SCARRING: MID

## 2025-08-02 ASSESSMENT — REFRACTION_AUTOREFRACTION
OS_SPHERE: UTP
OD_AXIS: 080
OD_SPHERE: +1.00
OD_CYLINDER: -0.50

## 2025-08-02 ASSESSMENT — CORNEAL EDEMA CLINICAL DESCRIPTION: OS_CORNEALEDEMA: T 1+

## 2025-08-02 ASSESSMENT — CONFRONTATIONAL VISUAL FIELD TEST (CVF)
OS_FINDINGS: FULL
OD_FINDINGS: FULL

## 2025-08-07 ENCOUNTER — OFFICE (OUTPATIENT)
Dept: URBAN - METROPOLITAN AREA CLINIC 115 | Facility: CLINIC | Age: 80
Setting detail: OPHTHALMOLOGY
End: 2025-08-07
Payer: MEDICARE

## 2025-08-07 DIAGNOSIS — S05.02XA: ICD-10-CM

## 2025-08-07 DIAGNOSIS — H26.493: ICD-10-CM

## 2025-08-07 DIAGNOSIS — Z96.1: ICD-10-CM

## 2025-08-07 DIAGNOSIS — H18.20: ICD-10-CM

## 2025-08-07 DIAGNOSIS — H04.123: ICD-10-CM

## 2025-08-07 PROCEDURE — 65778 COVER EYE W/MEMBRANE: CPT | Mod: LT | Performed by: OPHTHALMOLOGY

## 2025-08-07 PROCEDURE — 99213 OFFICE O/P EST LOW 20 MIN: CPT | Mod: 25 | Performed by: OPHTHALMOLOGY

## 2025-08-07 ASSESSMENT — CONFRONTATIONAL VISUAL FIELD TEST (CVF)
OS_FINDINGS: FULL
OD_FINDINGS: FULL

## 2025-08-07 ASSESSMENT — REFRACTION_MANIFEST
OD_SPHERE: -7.00
OD_VA2: 20/20
OD_CYLINDER: -0.25
OS_VA1: 20/20-1
OD_VA1: 20/20-2
OS_ADD: +1.25
OS_ADD: +1.50
OD_AXIS: 085
OD_ADD: +1.50
OS_CYLINDER: -0.25
OS_VA2: 20/20
OS_AXIS: 025
OS_VA1: 20/70-
OS_SPHERE: PLANO
OD_VA1: 20/40-
OD_SPHERE: PLANO
OS_SPHERE: -7.25
OD_ADD: +1.25

## 2025-08-07 ASSESSMENT — REFRACTION_CURRENTRX
OS_VPRISM_DIRECTION: SV
OD_AXIS: 085
OD_SPHERE: -7.00
OS_OVR_VA: 20/
OS_SPHERE: -7.25
OD_VPRISM_DIRECTION: SV
OD_CYLINDER: -0.25
OS_OVR_VA: 20/
OS_AXIS: 025
OS_SPHERE: +2.00
OD_ADD: +1.50
OD_VPRISM_DIRECTION: SV
OD_OVR_VA: 20/
OS_ADD: +1.50
OS_VPRISM_DIRECTION: SV
OD_OVR_VA: 20/
OS_CYLINDER: -0.25
OD_SPHERE: +2.00

## 2025-08-07 ASSESSMENT — CORNEAL EDEMA - FOLDS/STRIAE: OS_FOLDSSTRIAE: T 1+

## 2025-08-07 ASSESSMENT — VISUAL ACUITY
OS_BCVA: 20/30-2
OD_BCVA: 20/200

## 2025-08-07 ASSESSMENT — REFRACTION_AUTOREFRACTION
OD_SPHERE: +1.50
OD_AXIS: 065
OS_SPHERE: UTP
OD_CYLINDER: -0.50

## 2025-08-07 ASSESSMENT — CORNEAL SURGICAL SCARRING: OS_SCARRING: MID

## 2025-08-07 ASSESSMENT — CORNEAL TRAUMA: OS_TRAUMA: CENTRALLY

## 2025-08-07 ASSESSMENT — CORNEAL EDEMA CLINICAL DESCRIPTION: OS_CORNEALEDEMA: T 1+

## 2025-08-07 ASSESSMENT — CORNEAL TRAUMA - ABRASION: OS_ABRASION: PRESENT

## 2025-08-12 ENCOUNTER — OFFICE (OUTPATIENT)
Dept: URBAN - METROPOLITAN AREA CLINIC 115 | Facility: CLINIC | Age: 80
Setting detail: OPHTHALMOLOGY
End: 2025-08-12
Payer: MEDICARE

## 2025-08-12 DIAGNOSIS — H26.493: ICD-10-CM

## 2025-08-12 DIAGNOSIS — H18.20: ICD-10-CM

## 2025-08-12 DIAGNOSIS — Z96.1: ICD-10-CM

## 2025-08-12 DIAGNOSIS — H04.123: ICD-10-CM

## 2025-08-12 DIAGNOSIS — S05.02XA: ICD-10-CM

## 2025-08-12 PROCEDURE — 92012 INTRM OPH EXAM EST PATIENT: CPT | Performed by: OPHTHALMOLOGY

## 2025-08-12 ASSESSMENT — REFRACTION_AUTOREFRACTION
OD_AXIS: 077
OS_SPHERE: UTP
OD_CYLINDER: -0.75
OD_SPHERE: +1.25

## 2025-08-12 ASSESSMENT — REFRACTION_CURRENTRX
OD_VPRISM_DIRECTION: SV
OS_VPRISM_DIRECTION: SV
OS_VPRISM_DIRECTION: SV
OS_SPHERE: +2.00
OS_CYLINDER: -0.25
OD_SPHERE: -7.00
OS_SPHERE: -7.25
OD_OVR_VA: 20/
OD_CYLINDER: -0.25
OD_VPRISM_DIRECTION: SV
OS_OVR_VA: 20/
OS_ADD: +1.50
OD_OVR_VA: 20/
OD_AXIS: 085
OD_ADD: +1.50
OS_AXIS: 025
OD_SPHERE: +2.00
OS_OVR_VA: 20/

## 2025-08-12 ASSESSMENT — REFRACTION_MANIFEST
OD_ADD: +1.25
OS_ADD: +1.50
OD_VA1: 20/40-
OD_VA2: 20/20
OS_ADD: +1.25
OS_VA1: 20/20-1
OS_SPHERE: PLANO
OD_SPHERE: PLANO
OD_CYLINDER: -0.25
OD_AXIS: 085
OD_ADD: +1.50
OS_VA1: 20/70-
OD_SPHERE: -7.00
OD_VA1: 20/20-2
OS_VA2: 20/20
OS_SPHERE: -7.25
OS_AXIS: 025
OS_CYLINDER: -0.25

## 2025-08-12 ASSESSMENT — VISUAL ACUITY
OD_BCVA: 20/300
OS_BCVA: 20/40-1

## 2025-08-12 ASSESSMENT — CORNEAL TRAUMA - ABRASION: OS_ABRASION: PRESENT

## 2025-08-12 ASSESSMENT — TONOMETRY
OS_IOP_MMHG: 16
OD_IOP_MMHG: 11

## 2025-08-12 ASSESSMENT — CORNEAL EDEMA - FOLDS/STRIAE: OS_FOLDSSTRIAE: T 1+

## 2025-08-12 ASSESSMENT — CORNEAL EDEMA CLINICAL DESCRIPTION: OS_CORNEALEDEMA: T 1+

## 2025-08-12 ASSESSMENT — CONFRONTATIONAL VISUAL FIELD TEST (CVF)
OD_FINDINGS: FULL
OS_FINDINGS: FULL

## 2025-08-12 ASSESSMENT — CORNEAL SURGICAL SCARRING: OS_SCARRING: MID

## 2025-08-12 ASSESSMENT — CORNEAL TRAUMA: OS_TRAUMA: CENTRALLY

## 2025-08-27 ENCOUNTER — APPOINTMENT (OUTPATIENT)
Dept: CARDIOLOGY | Facility: CLINIC | Age: 80
End: 2025-08-27
Payer: MEDICARE

## 2025-08-27 VITALS
BODY MASS INDEX: 24.11 KG/M2 | WEIGHT: 178 LBS | OXYGEN SATURATION: 94 % | SYSTOLIC BLOOD PRESSURE: 126 MMHG | HEART RATE: 77 BPM | DIASTOLIC BLOOD PRESSURE: 72 MMHG | RESPIRATION RATE: 16 BRPM | HEIGHT: 72 IN

## 2025-08-27 DIAGNOSIS — I77.810 THORACIC AORTIC ECTASIA: ICD-10-CM

## 2025-08-27 DIAGNOSIS — I48.91 UNSPECIFIED ATRIAL FIBRILLATION: ICD-10-CM

## 2025-08-27 DIAGNOSIS — E78.00 PURE HYPERCHOLESTEROLEMIA, UNSPECIFIED: ICD-10-CM

## 2025-08-27 DIAGNOSIS — I10 ESSENTIAL (PRIMARY) HYPERTENSION: ICD-10-CM

## 2025-08-27 DIAGNOSIS — J84.9 INTERSTITIAL PULMONARY DISEASE, UNSPECIFIED: ICD-10-CM

## 2025-08-27 DIAGNOSIS — M06.9 RHEUMATOID ARTHRITIS, UNSPECIFIED: ICD-10-CM

## 2025-08-27 PROCEDURE — G2211 COMPLEX E/M VISIT ADD ON: CPT

## 2025-08-27 PROCEDURE — 93000 ELECTROCARDIOGRAM COMPLETE: CPT

## 2025-08-27 PROCEDURE — 99214 OFFICE O/P EST MOD 30 MIN: CPT

## 2025-08-27 RX ORDER — APIXABAN 2.5 MG/1
2.5 TABLET, FILM COATED ORAL TWICE DAILY
Refills: 0 | Status: ACTIVE | COMMUNITY

## 2025-09-08 ENCOUNTER — RESULT REVIEW (OUTPATIENT)
Age: 80
End: 2025-09-08

## 2025-09-08 ENCOUNTER — TRANSCRIPTION ENCOUNTER (OUTPATIENT)
Age: 80
End: 2025-09-08

## 2025-09-08 RX ORDER — CLOPIDOGREL BISULFATE 75 MG/1
75 TABLET, FILM COATED ORAL DAILY
Qty: 90 | Refills: 3 | Status: ACTIVE | COMMUNITY
Start: 2025-09-08 | End: 1900-01-01

## 2025-09-13 ENCOUNTER — NON-APPOINTMENT (OUTPATIENT)
Age: 80
End: 2025-09-13